# Patient Record
Sex: FEMALE | Race: BLACK OR AFRICAN AMERICAN | Employment: OTHER | URBAN - METROPOLITAN AREA
[De-identification: names, ages, dates, MRNs, and addresses within clinical notes are randomized per-mention and may not be internally consistent; named-entity substitution may affect disease eponyms.]

---

## 2017-01-17 ENCOUNTER — HOSPITAL ENCOUNTER (OUTPATIENT)
Dept: MAMMOGRAPHY | Age: 67
Discharge: HOME | End: 2017-01-17
Attending: INTERNAL MEDICINE
Payer: COMMERCIAL

## 2017-01-17 DIAGNOSIS — Z12.31 VISIT FOR SCREENING MAMMOGRAM: ICD-10-CM

## 2017-01-17 PROCEDURE — 77063 BREAST TOMOSYNTHESIS BI: CPT

## 2017-10-12 ENCOUNTER — HOSPITAL ENCOUNTER (OUTPATIENT)
Dept: LAB | Age: 67
Discharge: HOME | End: 2017-10-12
Payer: MEDICARE

## 2017-10-12 ENCOUNTER — OFFICE VISIT (OUTPATIENT)
Dept: FAMILY MEDICINE CLINIC | Age: 67
End: 2017-10-12

## 2017-10-12 VITALS
TEMPERATURE: 98 F | HEIGHT: 62 IN | WEIGHT: 213 LBS | RESPIRATION RATE: 20 BRPM | SYSTOLIC BLOOD PRESSURE: 147 MMHG | DIASTOLIC BLOOD PRESSURE: 83 MMHG | OXYGEN SATURATION: 98 % | HEART RATE: 84 BPM | BODY MASS INDEX: 39.2 KG/M2

## 2017-10-12 DIAGNOSIS — R36.9 URETHRAL DISCHARGE: ICD-10-CM

## 2017-10-12 DIAGNOSIS — Z01.419 WELL WOMAN EXAM WITH ROUTINE GYNECOLOGICAL EXAM: ICD-10-CM

## 2017-10-12 DIAGNOSIS — Z01.419 WELL WOMAN EXAM WITH ROUTINE GYNECOLOGICAL EXAM: Primary | ICD-10-CM

## 2017-10-12 DIAGNOSIS — B96.89 BACTERIAL VAGINOSIS: ICD-10-CM

## 2017-10-12 DIAGNOSIS — N76.0 BACTERIAL VAGINOSIS: ICD-10-CM

## 2017-10-12 DIAGNOSIS — N94.10 DYSPAREUNIA, FEMALE: ICD-10-CM

## 2017-10-12 DIAGNOSIS — N89.8 VAGINAL DISCHARGE: ICD-10-CM

## 2017-10-12 PROCEDURE — 87624 HPV HI-RISK TYP POOLED RSLT: CPT | Performed by: INTERNAL MEDICINE

## 2017-10-12 PROCEDURE — 87798 DETECT AGENT NOS DNA AMP: CPT | Performed by: INTERNAL MEDICINE

## 2017-10-12 PROCEDURE — 88175 CYTOPATH C/V AUTO FLUID REDO: CPT | Performed by: INTERNAL MEDICINE

## 2017-10-12 RX ORDER — GLIPIZIDE 10 MG/1
10 TABLET ORAL 2 TIMES DAILY
COMMUNITY
End: 2019-02-06 | Stop reason: SDUPTHER

## 2017-10-12 RX ORDER — ASPIRIN 81 MG/1
TABLET ORAL DAILY
COMMUNITY

## 2017-10-12 RX ORDER — COLESEVELAM 180 1/1
1875 TABLET ORAL 2 TIMES DAILY WITH MEALS
COMMUNITY
End: 2017-12-04 | Stop reason: SDUPTHER

## 2017-10-12 RX ORDER — HYDROXYCHLOROQUINE SULFATE 200 MG/1
200 TABLET, FILM COATED ORAL DAILY
COMMUNITY

## 2017-10-12 RX ORDER — CARVEDILOL 6.25 MG/1
TABLET ORAL 2 TIMES DAILY WITH MEALS
COMMUNITY
End: 2017-11-20 | Stop reason: SDUPTHER

## 2017-10-12 RX ORDER — HYDROCHLOROTHIAZIDE 12.5 MG/1
12.5 TABLET ORAL DAILY
COMMUNITY
End: 2019-10-30 | Stop reason: SDUPTHER

## 2017-10-12 RX ORDER — METRONIDAZOLE 500 MG/1
500 TABLET ORAL 3 TIMES DAILY
Qty: 21 TAB | Refills: 0 | Status: SHIPPED | OUTPATIENT
Start: 2017-10-12 | End: 2017-10-19

## 2017-10-12 RX ORDER — RAMIPRIL 10 MG/1
10 CAPSULE ORAL DAILY
COMMUNITY
End: 2018-04-26 | Stop reason: SDUPTHER

## 2017-10-12 NOTE — PROGRESS NOTES
History of Present Illness  Saskia Jennings is a 61 y.o. female who presents today for management of    Chief Complaint   Patient presents with   [facility] Well Woman       Patient has a PCP. She is here for a well woman exam.  Last LMP: postmenopausal  Patient reports of having abnormal vaginal discharge for 5 months. It is associated with foul odor, color is yellow. She also reports of dyspareunia. No associated vaginal bleeding. She is  sexually active with one partner. Last pap smear: more than 5 years ago  History of abnormal pap smear:  no    She has history of left breast cancer 30 years, s/p left mastectomy. Last mammogram was January 2017. Past Medical History  Past Medical History:   Diagnosis Date    [facility]     Diabetes ([facility]     Hypertension     IBS (irritable bowel syndrome)     SLE (systemic lupus erythematosus) ([facility]         Surgical History  Past Surgical History:   Procedure Laterality Date    HX BREAST RECONSTRUCTION Left     HX CHOLECYSTECTOMY      HX MASTECTOMY Left         Current Medications  Current Outpatient Prescriptions   Medication Sig    hydroxychloroquine (PLAQUENIL) 200 mg tablet Take 200 mg by mouth daily.  ramipril (ALTACE) 10 mg capsule Take 10 mg by mouth daily.  carvedilol (COREG) 6.25 mg tablet Take  by mouth two (2) times daily (with meals).  hydroCHLOROthiazide (HYDRODIURIL) 12.5 mg tablet Take 12.5 mg by mouth daily.  colesevelam (WELCHOL) 625 mg tablet Take 1,875 mg by mouth two (2) times daily (with meals).  aspirin delayed-release 81 mg tablet Take  by mouth daily.  glipiZIDE (GLUCOTROL) 10 mg tablet Take 10 mg by mouth two (2) times a day.  metroNIDAZOLE (FLAGYL) 500 mg tablet Take 1 Tab by mouth three (3) times daily for 7 days. No current facility-administered medications for this visit.         Allergies/Drug Reactions  Not on File     Family History  Family History   Problem Relation Age of Onset    Diabetes Mother     Heart Disease Father         Social History  Social History     Social History    Marital status:      Spouse name: N/A    Number of children: N/A    Years of education: N/A     Occupational History    Not on file. Social History Main Topics    Smoking status: Never Smoker    Smokeless tobacco: Never Used    Alcohol use No    Drug use: No    Sexual activity: Yes     Partners: Male     Other Topics Concern    Not on file     Social History Narrative    No narrative on file       Health Maintenance   Topic Date Due    Hepatitis C Screening  1950    DTaP/Tdap/Td series (1 - Tdap) 10/29/1973    PAP AKA CERVICAL CYTOLOGY  10/29/1973    BREAST CANCER SCRN MAMMOGRAM  10/29/2002    FOBT Q 1 YEAR AGE 50-75  10/29/2002    ZOSTER VACCINE AGE 60>  08/29/2012    INFLUENZA AGE 9 TO ADULT  Completed       There is no immunization history on file for this patient.     Review of Systems  General ROS: negative for - chills, fatigue or fever  Psychological ROS: negative  Ophthalmic ROS: negative  ENT ROS: negative  Allergy and Immunology ROS: negative  Endocrine ROS: negative  Breast ROS: negative for breast lumps  Respiratory ROS: no cough, shortness of breath, or wheezing  Cardiovascular ROS: no chest pain or dyspnea on exertion  Gastrointestinal ROS: no abdominal pain, change in bowel habits, or black or bloody stools  Genito-Urinary ROS: no dysuria, trouble voiding, or hematuria  positive for - vulvar/vaginal symptoms  Musculoskeletal ROS: negative  Neurological ROS: negative  Dermatological ROS: negative    Physical Exam  Vital signs:   Vitals:    10/12/17 1043   BP: 147/83   Pulse: 84   Resp: 20   Temp: 98 °F (36.7 °C)   TempSrc: Oral   SpO2: 98%   Weight: 213 lb (96.6 kg)   Height: 5' 2\" (1.575 m)       General: alert, oriented, not in distress  Chest/Lungs: clear breath sounds, no wheezing or crackles  Heart: normal rate, regular rhythm, no murmur  Abdomen: soft, non-distended, non-tender, normal bowel sounds, no organomegaly, no masses  Extremities: no focal deformities, no edema  Skin: no active skin lesions  Breasts: right breast normal without mass, skin or nipple changes or axillary nodes, left breast normal without mass, skin or nipple changes or axillary nodes  Pelvic exam: VULVA: normal appearing vulva with no masses, tenderness or lesions, VAGINA: normal appearing vagina with normal color and discharge, no lesions, CERVIX: normal appearing cervix without discharge or lesions, UTERUS: uterus is normal size, shape, consistency and nontender, ADNEXA: nontender and no masses, BLADDER: nontender to palpation, no masses, URETHRAL MEATUS: no erythema or lesions present, PERINEUM: no evidence of trauma, no rashes or skin lesions present, ANUS: within normal limits, no hemorrhoids present    Assessment/Plan:        ICD-10-CM ICD-9-CM    1. Well woman exam with routine gynecological exam Z01.419 V72.31 PAP IG, APTIMA HPV AND RFX 16/18,45 ([id number])      NUSWAB VAGINITIS PLUS   2. Vaginal discharge N89.8 623.5 NUSWAB VAGINITIS PLUS   3. Dyspareunia, female N94.10 625.0    4. Urethral discharge  R36.9 788.7 NUSWAB VAGINITIS PLUS   5. Bacterial vaginosis N76.0 616.10 metroNIDAZOLE (FLAGYL) 500 mg tablet    B96.89 041.9        Follow-up Disposition:  Return as needed, for Follow-up with PCP. I have discussed the diagnosis with the patient and the intended plan as seen in the above orders. The patient has received an after-visit summary and questions were answered concerning future plans. I have discussed medication side effects and warnings with the patient as well. I have reviewed the plan of care with the patient, accepted their input and they are in agreement with the treatment goals.        Brissa Cao MD  October 12, 2017

## 2017-10-12 NOTE — PROGRESS NOTES
History of Present Illness  Abdi Cooney is a 61 y.o. female who presents today for management of    No chief complaint on file. ***    Health Maintenance  Colon cancer: due at age 50  Dyslipidemia: will check FLP and CMP  Diabetes mellitus: will check FBG  Influenza vaccine: due in the fall  Pneumococcal vaccine: ***  Tdap: ***  Herpes Zoster vaccine: due at age 63  Hep B vaccine: not indicated    Weight: Body mass index is Estimated body mass index is Body mass index is 38.96 kg/(m^2). Nabeel Harris Discussed the patient's BMI with her.  The BMI follow up plan is as follows: Improve diet and 30 min of moderate activity at least 5 times a week. Cervical cancer:  Pap smear ***  Breast Cancer: Mammogram ***   Osteoporosis: DEXA scan ***  Diet:  {Yes/No:19414::\"no\"}  Exercise:  {Yes/No:19414::\"no\"}  Seatbelt: {Yes/No:19414::\"no\"}  Sunscreen: {Yes/No:19414::\"no\"}  Dentist: {Yes/No:19414::\"no\"}      Past Medical History  No past medical history on file. Surgical History  No past surgical history on file. Current Medications  Current Outpatient Prescriptions   Medication Sig    hydroxychloroquine (PLAQUENIL) 200 mg tablet Take 200 mg by mouth daily.  ramipril (ALTACE) 10 mg capsule Take 10 mg by mouth daily.  carvedilol (COREG) 6.25 mg tablet Take  by mouth two (2) times daily (with meals).  hydroCHLOROthiazide (HYDRODIURIL) 12.5 mg tablet Take 12.5 mg by mouth daily.  colesevelam (WELCHOL) 625 mg tablet Take 1,875 mg by mouth two (2) times daily (with meals).  aspirin delayed-release 81 mg tablet Take  by mouth daily.  glipiZIDE (GLUCOTROL) 10 mg tablet Take 10 mg by mouth two (2) times a day. No current facility-administered medications for this visit. Allergies/Drug Reactions  Not on File     Family History  No family history on file.      Social History  Social History     Social History    Marital status:      Spouse name: N/A    Number of children: N/A    Years of education: N/A     Occupational History    Not on file. Social History Main Topics    Smoking status: Never Smoker    Smokeless tobacco: Not on file    Alcohol use No    Drug use: No    Sexual activity: Yes     Partners: Male     Other Topics Concern    Not on file     Social History Narrative    No narrative on file       Health Maintenance   Topic Date Due    Hepatitis C Screening  1950    DTaP/Tdap/Td series (1 - Tdap) 10/29/1973    PAP AKA CERVICAL CYTOLOGY  10/29/1973    BREAST CANCER SCRN MAMMOGRAM  10/29/2002    FOBT Q 1 YEAR AGE 50-75  10/29/2002    ZOSTER VACCINE AGE 60>  08/29/2012    INFLUENZA AGE 9 TO ADULT  08/01/2017       There is no immunization history on file for this patient. Review of Systems  {ros master:103676}    No exam data present    Physical Exam  Vital signs:   Vitals:    10/12/17 1043   BP: 147/83   Pulse: 84   Resp: 20   Temp: 98 °F (36.7 °C)   TempSrc: Oral   SpO2: 98%   Weight: 213 lb (96.6 kg)   Height: 5' 2\" (1.575 m)       General: alert, oriented, not in distress  Head: scalp normal, atraumatic  Eyes: pupils are equal and reactive, full and intact EOM's  Ears: patent ear canal, intact tympanic membrane  Nose: normal turbinates, no congestion or discharge  Lips/Mouth: moist lips and buccal mucosa, non-enlarged tonsils, pink throat  Neck: supple, no JVD, no lymphadenopathy, non-palpable thyroid  Chest/Lungs: clear breath sounds, no wheezing or crackles  Heart: normal rate, regular rhythm, no murmur  Abdomen: soft, non-distended, non-tender, normal bowel sounds, no organomegaly, no masses  Extremities: no focal deformities, no edema  Skin: no active skin lesions    Laboratory/Tests:      Assessment/Plan:              Follow-up Disposition: Not on File      I have discussed the diagnosis with the patient and the intended plan as seen in the above orders. The patient has received an after-visit summary and questions were answered concerning future plans.   I have discussed medication side effects and warnings with the patient as well. I have reviewed the plan of care with the patient, accepted their input and they are in agreement with the treatment goals.        Fernanda Nam MD  October 12, 2017

## 2017-10-12 NOTE — MR AVS SNAPSHOT
Visit Information Date & Time Provider Department Dept. Phone Encounter #  
 10/12/2017 10:45 AM Carlee Mishra, Andrei [facility] [phone] [id number] Follow-up Instructions Return in about 4 weeks (around 11/9/2017) for rov, results. Upcoming Health Maintenance Date Due Hepatitis C Screening 1950 DTaP/Tdap/Td series (1 - Tdap) 10/29/1973 PAP AKA CERVICAL CYTOLOGY 10/29/1973 BREAST CANCER SCRN MAMMOGRAM 10/29/2002 FOBT Q 1 YEAR AGE 50-75 10/29/2002 ZOSTER VACCINE AGE 60> 8/29/2012 Allergies as of 10/12/2017  Review Complete On: 10/12/2017 By: Carlee Mishra MD  
 Not on File Current Immunizations  Never Reviewed No immunizations on file. Not reviewed this visit You Were Diagnosed With   
  
 Codes Comments Well woman exam with routine gynecological exam    -  Primary ICD-10-CM: J48.426 ICD-9-CM: V72.31 Vaginal discharge     ICD-10-CM: N89.8 ICD-9-CM: 623.5 Dyspareunia, female     ICD-10-CM: N94.10 ICD-9-CM: 625.0 Urethral discharge     ICD-10-CM: R36.9 ICD-9-CM: 546. 7 Bacterial vaginosis     ICD-10-CM: N76.0, B96.89 
ICD-9-CM: 616.10, 041.9 Vitals BP Pulse Temp Resp Height(growth percentile) Weight(growth percentile) 147/83 84 98 °F (36.7 °C) (Oral) 20 5' 2\" (1.575 m) 213 lb (96.6 kg) SpO2 BMI OB Status Smoking Status 98% 38.96 kg/m2 Menopause Never Smoker BMI and BSA Data Body Mass Index Body Surface Area  
 38.96 kg/m 2 2.06 m 2 Preferred Pharmacy Pharmacy Name Phone [facility] [address] [place] [address] [phone]. [phone] Your Updated Medication List  
  
   
This list is accurate as of: 10/12/17 11:14 AM.  Always use your most recent med list. ALTACE 10 mg capsule Generic drug:  ramipril Take 10 mg by mouth daily. aspirin delayed-release 81 mg tablet Take  by mouth daily. COREG 6.25 mg tablet Generic drug:  carvedilol Take  by mouth two (2) times daily (with meals). glipiZIDE 10 mg tablet Commonly known as:  Maxcine Marroquin Take 10 mg by mouth two (2) times a day. hydroCHLOROthiazide 12.5 mg tablet Commonly known as:  HYDRODIURIL Take 12.5 mg by mouth daily. metroNIDAZOLE 500 mg tablet Commonly known as:  FLAGYL Take 1 Tab by mouth three (3) times daily for 7 days. PLAQUENIL 200 mg tablet Generic drug:  hydroxychloroquine Take 200 mg by mouth daily. WELCHOL 625 mg tablet Generic drug:  colesevelam  
Take 1,875 mg by mouth two (2) times daily (with meals). Prescriptions Sent to Pharmacy Refills  
 metroNIDAZOLE (FLAGYL) 500 mg tablet 0 Sig: Take 1 Tab by mouth three (3) times daily for 7 days. Class: Normal  
 Pharmacy: [facility]/pharmacy #[id number] #: [phone] Route: Oral  
  
Follow-up Instructions Return in about 4 weeks (around 11/9/2017) for rov, results. To-Do List   
 10/12/2017 Lab:  NUSWAB VAGINITIS PLUS   
  
 10/12/2017 Pathology:  PAP IG, APTIMA HPV AND RFX 16/18,45 ([id number]) Introducing [facility]! Heidy Camilo introduces GuestDriven patient portal. Now you can access parts of your medical record, email your doctor's office, and request medication refills online. 1. In your internet browser, go to [email or web address] 2. Click on the First Time User? Click Here link in the Sign In box. You will see the New Member Sign Up page. 3. Enter your GuestDriven Access Code exactly as it appears below. You will not need to use this code after youve completed the sign-up process. If you do not sign up before the expiration date, you must request a new code. · GuestDriven Access Code: [email or web address] Expires: 1/10/2018 11:14 AM 
 
4.  Enter the last four digits of your Social Security Number (xxxx) and Date of Birth (mm/dd/yyyy) as indicated and click Submit. You will be taken to the next sign-up page. 5. Create a Kozio ID. This will be your Kozio login ID and cannot be changed, so think of one that is secure and easy to remember. 6. Create a Kozio password. You can change your password at any time. 7. Enter your Password Reset Question and Answer. This can be used at a later time if you forget your password. 8. Enter your e-mail address. You will receive e-mail notification when new information is available in [address] 9. Click Sign Up. You can now view and download portions of your medical record. 10. Click the Download Summary menu link to download a portable copy of your medical information. If you have questions, please visit the Frequently Asked Questions section of the Kozio website. Remember, Kozio is NOT to be used for urgent needs. For medical emergencies, dial 911. Now available from your iPhone and Android! Please provide this summary of care documentation to your next provider. Your primary care clinician is listed as Shruti Johnson. If you have any questions after today's visit, please call [phone].

## 2017-10-16 ENCOUNTER — TELEPHONE (OUTPATIENT)
Dept: FAMILY MEDICINE CLINIC | Age: 67
End: 2017-10-16

## 2017-10-16 LAB
A VAGINAE DNA VAG QL NAA+PROBE: ABNORMAL SCORE
BVAB2 DNA VAG QL NAA+PROBE: ABNORMAL SCORE
C ALBICANS DNA VAG QL NAA+PROBE: NEGATIVE
C GLABRATA DNA VAG QL NAA+PROBE: NEGATIVE
C TRACH RRNA SPEC QL NAA+PROBE: NEGATIVE
MEGA1 DNA VAG QL NAA+PROBE: ABNORMAL SCORE
N GONORRHOEA RRNA SPEC QL NAA+PROBE: NEGATIVE
SPECIMEN SOURCE: ABNORMAL
T VAGINALIS RRNA SPEC QL NAA+PROBE: POSITIVE

## 2017-10-16 NOTE — PROGRESS NOTES
Rosalba vaginitis is positive for Trichomonas. Will treat. Sent prescription for metronidazole. Patient's partner needs to be treated as well. Please inform patient.

## 2017-10-17 NOTE — TELEPHONE ENCOUNTER
Patient returned call told her partner will need to be treated for the trichomonas as well as her. Patient stated she understands .

## 2017-11-20 ENCOUNTER — OFFICE VISIT (OUTPATIENT)
Dept: FAMILY MEDICINE CLINIC | Age: 67
End: 2017-11-20

## 2017-11-20 ENCOUNTER — HOSPITAL ENCOUNTER (OUTPATIENT)
Dept: LAB | Age: 67
Discharge: HOME | End: 2017-11-20
Payer: MEDICARE

## 2017-11-20 VITALS
TEMPERATURE: 97.1 F | RESPIRATION RATE: 20 BRPM | WEIGHT: 211 LBS | OXYGEN SATURATION: 97 % | HEART RATE: 75 BPM | BODY MASS INDEX: 38.83 KG/M2 | HEIGHT: 62 IN | DIASTOLIC BLOOD PRESSURE: 74 MMHG | SYSTOLIC BLOOD PRESSURE: 139 MMHG

## 2017-11-20 DIAGNOSIS — Z00.00 WELCOME TO MEDICARE PREVENTIVE VISIT: ICD-10-CM

## 2017-11-20 DIAGNOSIS — I10 ESSENTIAL HYPERTENSION: ICD-10-CM

## 2017-11-20 DIAGNOSIS — E11.9 TYPE 2 DIABETES MELLITUS WITHOUT COMPLICATION, WITHOUT LONG-TERM CURRENT USE OF INSULIN (HCC): ICD-10-CM

## 2017-11-20 DIAGNOSIS — E11.9 TYPE 2 DIABETES MELLITUS WITHOUT COMPLICATION, WITHOUT LONG-TERM CURRENT USE OF INSULIN (HCC): Primary | ICD-10-CM

## 2017-11-20 DIAGNOSIS — M32.9 SYSTEMIC LUPUS ERYTHEMATOSUS, UNSPECIFIED SLE TYPE, UNSPECIFIED ORGAN INVOLVEMENT STATUS (HCC): ICD-10-CM

## 2017-11-20 DIAGNOSIS — Z11.59 NEED FOR HEPATITIS C SCREENING TEST: ICD-10-CM

## 2017-11-20 DIAGNOSIS — Z71.89 ACP (ADVANCE CARE PLANNING): ICD-10-CM

## 2017-11-20 DIAGNOSIS — Z85.3 HISTORY OF BREAST CANCER: ICD-10-CM

## 2017-11-20 DIAGNOSIS — Z13.39 SCREENING FOR ALCOHOLISM: ICD-10-CM

## 2017-11-20 DIAGNOSIS — Z13.31 SCREENING FOR DEPRESSION: ICD-10-CM

## 2017-11-20 DIAGNOSIS — K58.0 IRRITABLE BOWEL SYNDROME WITH DIARRHEA: ICD-10-CM

## 2017-11-20 DIAGNOSIS — Z23 ENCOUNTER FOR IMMUNIZATION: ICD-10-CM

## 2017-11-20 LAB
ALBUMIN SERPL-MCNC: 3.6 G/DL (ref 3.4–5)
ALBUMIN/GLOB SERPL: 0.8 {RATIO} (ref 0.8–1.7)
ALP SERPL-CCNC: 64 U/L (ref 45–117)
ALT SERPL-CCNC: 27 U/L (ref 13–56)
ANION GAP SERPL CALC-SCNC: 5 MMOL/L (ref 3–18)
APPEARANCE UR: CLEAR
AST SERPL-CCNC: 14 U/L (ref 15–37)
BACTERIA URNS QL MICRO: ABNORMAL /HPF
BASOPHILS # BLD: 0 K/UL (ref 0–0.06)
BASOPHILS NFR BLD: 1 % (ref 0–2)
BILIRUB SERPL-MCNC: 0.4 MG/DL (ref 0.2–1)
BILIRUB UR QL: NEGATIVE
BUN SERPL-MCNC: 21 MG/DL (ref 7–18)
BUN/CREAT SERPL: 21 (ref 12–20)
CALCIUM SERPL-MCNC: 9.3 MG/DL (ref 8.5–10.1)
CHLORIDE SERPL-SCNC: 107 MMOL/L (ref 100–108)
CHOLEST SERPL-MCNC: 160 MG/DL
CO2 SERPL-SCNC: 29 MMOL/L (ref 21–32)
COLOR UR: YELLOW
CREAT SERPL-MCNC: 1.02 MG/DL (ref 0.6–1.3)
CREAT UR-MCNC: 100.47 MG/DL (ref 30–125)
DIFFERENTIAL METHOD BLD: ABNORMAL
EOSINOPHIL # BLD: 0.2 K/UL (ref 0–0.4)
EOSINOPHIL NFR BLD: 2 % (ref 0–5)
EPITH CASTS URNS QL MICRO: ABNORMAL /LPF (ref 0–5)
ERYTHROCYTE [DISTWIDTH] IN BLOOD BY AUTOMATED COUNT: 14.7 % (ref 11.6–14.5)
EST. AVERAGE GLUCOSE BLD GHB EST-MCNC: 169 MG/DL
GLOBULIN SER CALC-MCNC: 4.5 G/DL (ref 2–4)
GLUCOSE SERPL-MCNC: 114 MG/DL (ref 74–99)
GLUCOSE UR STRIP.AUTO-MCNC: NEGATIVE MG/DL
HBA1C MFR BLD: 7.5 % (ref 4.2–5.6)
HCT VFR BLD AUTO: 38.9 % (ref 35–45)
HDLC SERPL-MCNC: 76 MG/DL (ref 40–60)
HDLC SERPL: 2.1 {RATIO} (ref 0–5)
HGB BLD-MCNC: 12.3 G/DL (ref 12–16)
HGB UR QL STRIP: NEGATIVE
KETONES UR QL STRIP.AUTO: NEGATIVE MG/DL
LDLC SERPL CALC-MCNC: 61.6 MG/DL (ref 0–100)
LEUKOCYTE ESTERASE UR QL STRIP.AUTO: ABNORMAL
LIPID PROFILE,FLP: ABNORMAL
LYMPHOCYTES # BLD: 1.8 K/UL (ref 0.9–3.6)
LYMPHOCYTES NFR BLD: 27 % (ref 21–52)
MCH RBC QN AUTO: 25.1 PG (ref 24–34)
MCHC RBC AUTO-ENTMCNC: 31.6 G/DL (ref 31–37)
MCV RBC AUTO: 79.4 FL (ref 74–97)
MICROALBUMIN UR-MCNC: 0.4 MG/DL (ref 0–3)
MICROALBUMIN/CREAT UR-RTO: <4 MG/G (ref 0–30)
MONOCYTES # BLD: 0.4 K/UL (ref 0.05–1.2)
MONOCYTES NFR BLD: 6 % (ref 3–10)
NEUTS SEG # BLD: 4.2 K/UL (ref 1.8–8)
NEUTS SEG NFR BLD: 64 % (ref 40–73)
NITRITE UR QL STRIP.AUTO: NEGATIVE
PH UR STRIP: 5 [PH] (ref 5–8)
PLATELET # BLD AUTO: 321 K/UL (ref 135–420)
PMV BLD AUTO: 9.6 FL (ref 9.2–11.8)
POTASSIUM SERPL-SCNC: 4.3 MMOL/L (ref 3.5–5.5)
PROT SERPL-MCNC: 8.1 G/DL (ref 6.4–8.2)
PROT UR STRIP-MCNC: NEGATIVE MG/DL
RBC # BLD AUTO: 4.9 M/UL (ref 4.2–5.3)
RBC #/AREA URNS HPF: 0 /HPF (ref 0–5)
SODIUM SERPL-SCNC: 141 MMOL/L (ref 136–145)
SP GR UR REFRACTOMETRY: 1.02 (ref 1–1.03)
TRIGL SERPL-MCNC: 112 MG/DL (ref ?–150)
UROBILINOGEN UR QL STRIP.AUTO: 0.2 EU/DL (ref 0.2–1)
VLDLC SERPL CALC-MCNC: 22.4 MG/DL
WBC # BLD AUTO: 6.6 K/UL (ref 4.6–13.2)
WBC URNS QL MICRO: ABNORMAL /HPF (ref 0–4)

## 2017-11-20 PROCEDURE — 81001 URINALYSIS AUTO W/SCOPE: CPT | Performed by: INTERNAL MEDICINE

## 2017-11-20 PROCEDURE — 86803 HEPATITIS C AB TEST: CPT | Performed by: INTERNAL MEDICINE

## 2017-11-20 PROCEDURE — 82043 UR ALBUMIN QUANTITATIVE: CPT | Performed by: INTERNAL MEDICINE

## 2017-11-20 PROCEDURE — 36415 COLL VENOUS BLD VENIPUNCTURE: CPT | Performed by: INTERNAL MEDICINE

## 2017-11-20 PROCEDURE — 83036 HEMOGLOBIN GLYCOSYLATED A1C: CPT | Performed by: INTERNAL MEDICINE

## 2017-11-20 PROCEDURE — 80061 LIPID PANEL: CPT | Performed by: INTERNAL MEDICINE

## 2017-11-20 PROCEDURE — 80053 COMPREHEN METABOLIC PANEL: CPT | Performed by: INTERNAL MEDICINE

## 2017-11-20 PROCEDURE — 85025 COMPLETE CBC W/AUTO DIFF WBC: CPT | Performed by: INTERNAL MEDICINE

## 2017-11-20 RX ORDER — IBUPROFEN 600 MG/1
1 TABLET ORAL
Refills: 1 | COMMUNITY
Start: 2017-10-29 | End: 2021-07-26

## 2017-11-20 RX ORDER — SITAGLIPTIN AND METFORMIN HYDROCHLORIDE 100; 1000 MG/1; MG/1
1 TABLET, FILM COATED, EXTENDED RELEASE ORAL DAILY
Refills: 0 | COMMUNITY
Start: 2017-11-06 | End: 2017-12-04 | Stop reason: SDUPTHER

## 2017-11-20 NOTE — ACP (ADVANCE CARE PLANNING)
Advance Care Planning (ACP) Provider Note - Comprehensive     Date of ACP Conversation: 11/20/17  Persons included in Conversation:  patient  Length of ACP Conversation in minutes:  <16 minutes (Non-Billable)    Authorized Decision Maker (if patient is incapable of making informed decisions): This person is:  Healthcare Agent/Medical Power of  under Advance Directive          General ACP for ALL Patients with Decision Making Capacity:   Importance of advance care planning, including choosing a healthcare agent to communicate patient's healthcare decisions if patient lost the ability to make decisions, such as after a sudden illness or accident  Understanding of the healthcare agent role was assessed and information provided  Exploration of values, goals, and preferences if recovery is not expected, even with continued medical treatment in the event of: Imminent death  Severe, permanent brain injury  \"In these circumstances, what matters most to you? \"  Care focused more on comfort or quality of life. Opportunity offered to explore how cultural, Caodaism, spiritual, or personal beliefs would affect decisions for future care     For Serious or Chronic Illness:  Understanding of medical condition    Understanding of CPR, goals and expected outcomes, benefits and burdens discussed. Information on CPR success rates provided (e.g. for CPR in hospital, survival to d/c at two weeks is 22%, for chronically ill or elderly/frail survival is less than 3%); Individual asked to communicate understanding of information in his/her own words.   Explored fears and concerns regarding CPR or possible outcomes    Interventions Provided:  Recommended completion of Advance Directive form after review of ACP materials and conversation with prospective healthcare agent   Recommended communicating the plan and making copies for the healthcare agent, personal physician, and others as appropriate (e.g., health system)

## 2017-11-20 NOTE — ASSESSMENT & PLAN NOTE
Stable, based on history, physical exam and review of pertinent labs, studies and medications; meds reconciled; continue current treatment plan, lifestyle modifications recommended. Key Antihyperglycemic Medications             glipiZIDE (GLUCOTROL) 10 mg tablet  (Taking) Take 10 mg by mouth two (2) times a day. JANUMET -1,000 mg TM24 Take 1 Tab by mouth daily. Other Key Diabetic Medications             ramipril (ALTACE) 10 mg capsule  (Taking) Take 10 mg by mouth daily. colesevelam [facility]) 625 mg tablet  (Taking) Take 1,875 mg by mouth two (2) times daily (with meals). ramipril (ALTACE) 10 mg capsule  (Taking) TAKE ONE CAPSULE BY MOUTH DAILY    colesevelam (WELCHOL) 625 mg tablet  (Taking) Take 1,875 mg by mouth two (2) times daily (with meals).         No results found for: HBA1C, WLA9BEBG, KCR1NPNC, GLU, CREA, MCREA, CREAPOC, CREATEXT, MALBUR, MCALPOCT, MCACRPOC, MCACR, MCAU, MCAU2, MALBEXT, CHOL, CHOLPOCT, HDL, HDLPOC, LDLC, LDL, LDLCEXT, LDLCPOC, TRIGL, TGLPOCT, RRY1CLHM  Diabetic Foot and Eye Exam HM Status   Topic Date Due    Diabetic Foot Care  10/29/1962    Eye Exam  10/29/1960

## 2017-11-20 NOTE — ASSESSMENT & PLAN NOTE
This condition is managed by Specialist.  No results found for: WBC, WBCT, WBCPOC, HGB, HGBPOC, HCT, HCTPOC, PLT, PLTPOC, CREA, MCREA, CREAPOC, BUN, IBUN, BUNPOC, K, KPOCT, INR, PTP, PTINR, PTEXT, HGBEXT, HCTEXT, PLTEXT, PTEXT

## 2017-11-20 NOTE — PROGRESS NOTES
1. Have you been to the ER, urgent care clinic since your last visit? Hospitalized since your last visit? No    2. Have you seen or consulted any other health care providers outside of the [address] [place] [address] since your last visit? Include any pap smears or colon screening.  No

## 2017-11-20 NOTE — PROGRESS NOTES
History of Present Illness  Rachel Bonilla is a 74 y.o. female who presents today for management of    Chief Complaint   Patient presents with    Results    Diabetes    Welcome To Medicare    Hypertension       Patient is here to establish care. Cardiovascular Review:  The patient has diabetes, hypertension, hyperlipidemia   Diet and Lifestyle: generally follows a low fat low cholesterol diet, generally follows a low sodium diet, exercises sporadically, nonsmoker  Home BP Monitoring: is not measured at home. Pertinent ROS: taking medications as instructed, no medication side effects noted, no TIA's, no chest pain on exertion, no dyspnea on exertion, no swelling of ankles. Diabetes Mellitus:  She has diabetes mellitus, and  hypertension and hyperlipidemia. Diabetic ROS - medication compliance: compliant all of the time, diabetic diet compliance: compliant all of the time, home glucose monitoring: fasting values range  -214-. Lab review: orders written for new lab studies as appropriate; see orders. SLE  Patient was diagnosed with SLE in 3 years ago. Symptoms include malar rash and arthritis and are of mild severity. Symptoms are made worse by cold exposure. Symptoms are helped by ibuprofen. Associated symptoms include none. Patient denies associated none. Patient is currently taking the following medications associate with Systemic Lupus Erythematosus type syndrome: Plaquenil. Irritable Bowel Syndrome  Patient complains of irritable bowel syndrome. Onset of symptoms was several years ago. She describes symptoms as diarrhea. Patient denies difficulty swallowing, weight loss, melena, hematochezia, fever. Course to date has been well controlled. Previous visits for this problem: yes, last seen a few months ago by a consultant for this condition. Evaluation to date has been colonoscopy. Treatment to date has been Welchol.     Past Medical History:   Diagnosis Date    Angina pectoris w/ silent ischemia    [facility] [address]     Cancer ([facility] [address] 1987    breast    Chronic insomnia     Diabetes ([facility] [address] [facility]     Diabetes mellitus type II 2003    Exogenous obesity     HTN (hypertension) 4/5/2010    Hypertension     IBS (irritable bowel syndrome)     SLE (systemic lupus erythematosus) ([facility] [address]      Past Surgical History:   Procedure Laterality Date    EGD  02/20/09    HX BREAST BIOPSY  2003    right breast neg.  HX BREAST RECONSTRUCTION  1988    left breast    HX BREAST RECONSTRUCTION Left     HX BREAST REDUCTION Right 1987    HX CHOLECYSTECTOMY  1990    HX CHOLECYSTECTOMY      HX HEART CATHETERIZATION  11/28/03    left, normal    HX MASTECTOMY  1987    left breast    HX MASTECTOMY Left     OH COLONOSCOPY FLX DX W/COLLJ SPEC WHEN PFRMD  2005 3/09    [facility]by --egd capsule colon (NEG)     Family History   Problem Relation Age of Onset    Diabetes Mother     Heart Disease Father     Heart Attack Father     Breast Cancer Paternal Aunt     Diabetes Brother     Asthma Brother      Social History   Substance Use Topics    Smoking status: Never Smoker    Smokeless tobacco: Never Used    Alcohol use No     Allergies   Allergen Reactions    Penicillins Hives     Prior to Admission medications    Medication Sig Start Date End Date Taking? Authorizing Provider   hydroxychloroquine (PLAQUENIL) 200 mg tablet Take 200 mg by mouth daily. Yes Historical Provider   ramipril (ALTACE) 10 mg capsule Take 10 mg by mouth daily. Yes Historical Provider   carvedilol (COREG) 6.25 mg tablet Take  by mouth two (2) times daily (with meals). Yes Historical Provider   hydroCHLOROthiazide (HYDRODIURIL) 12.5 mg tablet Take 12.5 mg by mouth daily. Yes Historical Provider   [facility]) 625 mg tablet Take 1,875 mg by mouth two (2) times daily (with meals). Yes Historical Provider   aspirin delayed-release 81 mg tablet Take  by mouth daily.    Yes Historical Provider   glipiZIDE (GLUCOTROL) 10 mg tablet Take 10 mg by mouth two (2) times a day. Yes Historical Provider   Insulin Needles, Disposable, (BD INSULIN PEN NEEDLE UF SHORT) 31 X 5/16 \" Ndle USE WITH BYETTA PEN TWICE DAILY 1/13/11  Yes Evangelina Oliver MD   sitagliptin (JANUVIA) 25 mg tablet Take 25 mg by mouth daily. Yes Historical Provider   carvedilol (COREG) 6.25 mg tablet Take 1 Tab by mouth two (2) times daily (with meals). 1/5/11  Yes Evangelina Oliver MD   exenatide (BYETTA) 10 mcg/0.04 mL PnIj 1 Dose by SubCUTAneous route two (2) times a day. 12/13/10  Yes Evangelina Oliver MD   ramipril (ALTACE) 10 mg capsule TAKE ONE CAPSULE BY MOUTH DAILY 8/19/10  Yes Nic Trevino MD   estrogen, conjugated,-medroxyprogesterone (PREMPRO) 0.3-1.5 mg per tablet Take 1 Tab by mouth daily. 6/7/10  Yes Nic Trevino MD   [facility]) 625 mg tablet Take 1,875 mg by mouth two (2) times daily (with meals). Yes Historical Provider   hydrochlorothiazide (HYDRODIURIL) 25 mg tablet Take 1 Tab by mouth daily. 4/5/10  Yes Nic Trevino MD        Review of Systems     Objective:     Physical Exam         Past Medical History  Past Medical History:   Diagnosis Date    Angina pectoris     w/ silent ischemia    [facility] ([facility] [address]     Cancer ([facility] [address] 1987    breast    Chronic insomnia     Diabetes ([address]     Diabetes mellitus type II 2003    Exogenous obesity     HTN (hypertension) 4/5/2010    Hypertension     IBS (irritable bowel syndrome)     SLE (systemic lupus erythematosus) ([address]         Surgical History  Past Surgical History:   Procedure Laterality Date    EGD  02/20/09    HX BREAST BIOPSY  2003    right breast neg.     HX BREAST RECONSTRUCTION  1988    left breast    HX BREAST RECONSTRUCTION Left     HX BREAST REDUCTION Right 1987    HX CHOLECYSTECTOMY  1990    HX CHOLECYSTECTOMY      HX HEART CATHETERIZATION  11/28/03    left, normal    HX MASTECTOMY  1987    left breast    HX MASTECTOMY Left     FL COLONOSCOPY FLX DX W/COLLJ [facility] WHEN PFRMD  2005 3/09    [facility] --egd capsule colon (NEG)        Current Medications  Current Outpatient Prescriptions   Medication Sig    hydroxychloroquine (PLAQUENIL) 200 mg tablet Take 200 mg by mouth daily.  ramipril (ALTACE) 10 mg capsule Take 10 mg by mouth daily.  carvedilol (COREG) 6.25 mg tablet Take  by mouth two (2) times daily (with meals).  hydroCHLOROthiazide (HYDRODIURIL) 12.5 mg tablet Take 12.5 mg by mouth daily.  colesevelam (WELCHOL) 625 mg tablet Take 1,875 mg by mouth two (2) times daily (with meals).  aspirin delayed-release 81 mg tablet Take  by mouth daily.  glipiZIDE (GLUCOTROL) 10 mg tablet Take 10 mg by mouth two (2) times a day.  Insulin Needles, Disposable, (BD INSULIN PEN NEEDLE UF SHORT) 31 X 5/16 \" Ndle USE WITH BYETTA PEN TWICE DAILY    sitagliptin (JANUVIA) 25 mg tablet Take 25 mg by mouth daily.  carvedilol (COREG) 6.25 mg tablet Take 1 Tab by mouth two (2) times daily (with meals).  exenatide (BYETTA) 10 mcg/0.04 mL PnIj 1 Dose by SubCUTAneous route two (2) times a day.  ramipril (ALTACE) 10 mg capsule TAKE ONE CAPSULE BY MOUTH DAILY    estrogen, conjugated,-medroxyprogesterone (PREMPRO) 0.3-1.5 mg per tablet Take 1 Tab by mouth daily.  colesevelam (WELCHOL) 625 mg tablet Take 1,875 mg by mouth two (2) times daily (with meals).  hydrochlorothiazide (HYDRODIURIL) 25 mg tablet Take 1 Tab by mouth daily. No current facility-administered medications for this visit.         Allergies/Drug Reactions  Allergies   Allergen Reactions    Penicillins Hives        Family History  Family History   Problem Relation Age of Onset    Diabetes Mother     Heart Disease Father     Heart Attack Father     Breast Cancer Paternal Aunt     Diabetes Brother     Asthma Brother         Social History  Social History     Social History    Marital status:      Spouse name: N/A    Number of children: N/A    Years of education: N/A Occupational History    Not on file.      Social History Main Topics    Smoking status: Never Smoker    Smokeless tobacco: Never Used    Alcohol use No    Drug use: No    Sexual activity: Yes     Partners: Male     Other Topics Concern    Not on file     Social History Narrative    ** Merged History Encounter **            Health Maintenance   Topic Date Due    Hepatitis C Screening  1950    FOOT EXAM Q1  10/29/1962    MICROALBUMIN Q1  10/29/1962    EYE EXAM RETINAL OR DILATED Q1  10/29/1962    FOBT Q 1 YEAR AGE 50-75  10/29/2002    LIPID PANEL Q1  01/14/2011    HEMOGLOBIN A1C Q6M  06/13/2011    ZOSTER VACCINE AGE 60>  08/29/2012    GLAUCOMA SCREENING Q2Y  10/29/2017    OSTEOPOROSIS SCREENING (DEXA)  10/29/2017    Pneumococcal 65+ High/Highest Risk (2 of 2 - PPSV23) 10/29/2017    MEDICARE YEARLY EXAM  10/29/2017    BREAST CANCER SCRN MAMMOGRAM  01/17/2019    DTaP/Tdap/Td series (2 - Td) 04/05/2020    PAP AKA CERVICAL CYTOLOGY  10/12/2020    Influenza Age 9 to Adult  Completed     Immunization History   Administered Date(s) Administered    Hepatitis A Vaccine 01/09/2006, 10/20/2006    Influenza High Dose Vaccine PF 08/25/2017    Influenza Vaccine Whole 10/05/2009    TD Vaccine 04/05/2006    TDAP Vaccine 04/05/2010    ZZZ-RETIRED (DO NOT USE) Pneumococcal Vaccine (Unspecified Type) 02/18/2004       Review of Systems  General ROS: negative for - chills, fatigue or fever  Psychological ROS: negative  Ophthalmic ROS: negative  ENT ROS: negative  Allergy and Immunology ROS: negative  Hematological and Lymphatic ROS: negative  Breast ROS: negative for breast lumps  Respiratory ROS: no cough, shortness of breath, or wheezing  Cardiovascular ROS: no chest pain or dyspnea on exertion  Gastrointestinal ROS: no abdominal pain, change in bowel habits, or black or bloody stools  Genito-Urinary ROS: no dysuria, trouble voiding, or hematuria  Musculoskeletal ROS: positive for - joint pain and joint stiffness  Neurological ROS: negative  Dermatological ROS: negative    No exam data present    Physical Exam  Vital signs:   Vitals:    11/20/17 1113   BP: 139/74   Pulse: 75   Resp: 20   Temp: 97.1 °F (36.2 °C)   TempSrc: Oral   SpO2: 97%   Weight: 211 lb (95.7 kg)   Height: 5' 2\" (1.575 m)       General: alert, oriented, not in distress  Head: scalp normal, atraumatic  Eyes: pupils are equal and reactive, full and intact EOM's  Ears: patent ear canal, intact tympanic membrane  Nose: normal turbinates, no congestion or discharge  Lips/Mouth: moist lips and buccal mucosa, non-enlarged tonsils, pink throat  Neck: supple, no JVD, no lymphadenopathy, non-palpable thyroid  Chest/Lungs: clear breath sounds, no wheezing or crackles  Heart: normal rate, regular rhythm, no murmur  Abdomen: soft, non-distended, non-tender, normal bowel sounds, no organomegaly, no masses  Extremities: no focal deformities, no edema  Skin: no active skin lesions      Assessment/Plan:    1. Type 2 diabetes mellitus without complication, without long-term current use of insulin ([facility] [address]  - control uncertain  - continue current medications  - CBC WITH AUTOMATED DIFF; Future  - HEMOGLOBIN A1C WITH EAG; Future  - LIPID PANEL; Future  - METABOLIC PANEL, COMPREHENSIVE; Future  - MICROALBUMIN, UR, RAND W/ MICROALBUMIN/CREA RATIO; Future    2. Essential hypertension  - controlled  - low salt diet  - continue current medications  - METABOLIC PANEL, COMPREHENSIVE; Future  - URINALYSIS W/ RFLX MICROSCOPIC; Future    3. Systemic lupus erythematosus, unspecified SLE type, unspecified organ involvement status ([facility] [address]  - stable  - rheumatology follow-up  - continue Plaquenil as per rheum    4. Need for hepatitis C screening test  - HCV AB W/RFLX TO ANALI; Future    5. IBS  - stable  - GI follow-up    6. History of breast cancer 30 years ago  - stable    Follow-up Disposition:  Return in about 2 weeks (around 12/4/2017) for results, follow-up.       I have discussed the diagnosis with the patient and the intended plan as seen in the above orders. The patient has received an after-visit summary and questions were answered concerning future plans. I have discussed medication side effects and warnings with the patient as well. I have reviewed the plan of care with the patient, accepted their input and they are in agreement with the treatment goals. Nidia Guy MD  November 20, 2017      This is a \"Welcome to [employer]"  Initial Preventive Physical Examination (IPPE) providing Personalized Prevention Plan Services (Performed in the first 12 months of enrollment)    I have reviewed the patient's medical history in detail and updated the computerized patient record. History     Past Medical History:   Diagnosis Date    Angina pectoris     w/ silent ischemia    [facility] [address]     Cancer ([facility] [address] 1987    breast    Chronic insomnia     Diabetes ([facility] [address]     Diabetes mellitus type II 2003    Exogenous obesity     HTN (hypertension) 4/5/2010    Hypertension     IBS (irritable bowel syndrome)     SLE (systemic lupus erythematosus) ([facility] [address]       Past Surgical History:   Procedure Laterality Date    EGD  02/20/09    HX BREAST BIOPSY  2003    right breast neg.  HX BREAST RECONSTRUCTION  1988    left breast    HX BREAST RECONSTRUCTION Left     HX BREAST REDUCTION Right 1987    HX CHOLECYSTECTOMY  1990    HX CHOLECYSTECTOMY      HX HEART CATHETERIZATION  11/28/03    left, normal    HX MASTECTOMY  1987    left breast    HX MASTECTOMY Left     NE COLONOSCOPY FLX DX W/COLLJ SPEC WHEN PFRMD  2005 3/09    [facility] --egd capsule colon (NEG)     Current Outpatient Prescriptions   Medication Sig Dispense Refill    diph,pertuss,acel,,tetanus vac,PF, (ADACEL) 2 Lf-(2.5-5-3-5 mcg)-5Lf/0.5 mL syrg vaccine 0.5 mL by IntraMUSCular route once for 1 dose. 0.5 mL 0    hydroxychloroquine (PLAQUENIL) 200 mg tablet Take 200 mg by mouth daily.       ramipril (ALTACE) 10 mg capsule Take 10 mg by mouth daily.  hydroCHLOROthiazide (HYDRODIURIL) 12.5 mg tablet Take 12.5 mg by mouth daily.  colesevelam (WELCHOL) 625 mg tablet Take 1,875 mg by mouth two (2) times daily (with meals).  aspirin delayed-release 81 mg tablet Take  by mouth daily.  glipiZIDE (GLUCOTROL) 10 mg tablet Take 10 mg by mouth two (2) times a day.  ramipril (ALTACE) 10 mg capsule TAKE ONE CAPSULE BY MOUTH DAILY 90 Cap 3    colesevelam (WELCHOL) 625 mg tablet Take 1,875 mg by mouth two (2) times daily (with meals).  hydrochlorothiazide (HYDRODIURIL) 25 mg tablet Take 1 Tab by mouth daily. 90 Tab 3    JANUMET -1,000 mg TM24 Take 1 Tab by mouth daily. 0    ibuprofen (MOTRIN) 600 mg tablet Take 1 Tab by mouth every six (6) hours as needed. 1     Allergies   Allergen Reactions    Penicillins Hives     Family History   Problem Relation Age of Onset    Diabetes Mother     Heart Disease Father     Heart Attack Father     Breast Cancer Paternal Aunt     Diabetes Brother     Asthma Brother      Social History   Substance Use Topics    Smoking status: Never Smoker    Smokeless tobacco: Never Used    Alcohol use No     Diet, Lifestyle: No special diet    Exercise level: moderately active    Depression Risk Screen     PHQ over the last two weeks 11/20/2017   Little interest or pleasure in doing things Not at all   Feeling down, depressed or hopeless Not at all   Total Score PHQ 2 0     Alcohol Risk Screen   You do not drink alcohol or very rarely. Functional Ability and Level of Safety   Hearing Loss  Hearing is good. Vision Screening  Vision is good. No exam data present      Activities of Daily Living  The home contains: no safety equipment. Patient does total self care    Fall Risk Screen  Fall Risk Assessment, last 12 mths 11/20/2017   Able to walk? Yes   Fall in past 12 months?  No       Abuse Screen  Patient is not abused    Screening EKG   EKG order placed: No    Patient Care Team   Patient Care Team:  Ten Kim MD as PCP - General (Internal Medicine)  Nikia Mccloud MD (Internal Medicine)  Jose Armando Araujo MD as Physician (Rheumatology)  Ирина Foreman MD as Physician (Ophthalmology)  Luiz Chin MD (Gastroenterology)     End of Life Planning   Advanced care planning directives were discussed with the patient and /or family/caregiver. Assessment/Plan   Education and counseling provided:  Are appropriate based on today's review and evaluation  End-of-Life planning (with patient's consent)  Pneumococcal Vaccine    Diagnoses and all orders for this visit:    1. Type 2 diabetes mellitus without complication, without long-term current use of insulin (HCC)  -     CBC WITH AUTOMATED DIFF; Future  -     HEMOGLOBIN A1C WITH EAG; Future  -     LIPID PANEL; Future  -     METABOLIC PANEL, COMPREHENSIVE; Future  -     MICROALBUMIN, UR, RAND W/ MICROALBUMIN/CREA RATIO; Future    2. Essential hypertension  -     METABOLIC PANEL, COMPREHENSIVE; Future  -     URINALYSIS W/ RFLX MICROSCOPIC; Future    3. Systemic lupus erythematosus, unspecified SLE type, unspecified organ involvement status ([facility] [address]    4. Need for hepatitis C screening test  -     HCV AB W/RFLX TO ANALI; Future    5. ACP (advance care planning)    6. Welcome to Medicare preventive visit    7. Screening for alcoholism  -     Annual  Alcohol Screen 15 min ()    8. Screening for depression  -     Depression Screen Annual    9. Encounter for immunization  -     Pneumococcal Admin ()  -     Pneumococcal Conjugate Vaccine  -     diph,pertuss,acel,,tetanus vac,PF, (ADACEL) 2 Lf-(2.5-5-3-5 mcg)-5Lf/0.5 mL syrg vaccine; 0.5 mL by IntraMUSCular route once for 1 dose.         Health Maintenance Due   Topic Date Due    Hepatitis C Screening  1950    FOOT EXAM Q1  10/29/1962    MICROALBUMIN Q1  10/29/1962    EYE EXAM RETINAL OR DILATED Q1  10/29/1962    FOBT Q 1 YEAR AGE 50-75  10/29/2002    LIPID PANEL Q1  01/14/2011    HEMOGLOBIN A1C Q6M  06/13/2011    ZOSTER VACCINE AGE 60>  08/29/2012    GLAUCOMA SCREENING Q2Y  10/29/2017    OSTEOPOROSIS SCREENING (DEXA)  10/29/2017    Pneumococcal 65+ High/Highest Risk (2 of 2 - PPSV23) 10/29/2017    MEDICARE YEARLY EXAM  10/29/2017

## 2017-11-20 NOTE — PATIENT INSTRUCTIONS
Medicare Wellness Visit, Female    The best way to live healthy is to have a healthy lifestyle by eating a well-balanced diet, exercising regularly, limiting alcohol and stopping smoking. Regular physical exams and screening tests are another way to keep healthy. Preventive exams provided by your health care provider can find health problems before they become diseases or illnesses. Preventive services including immunizations, screening tests, monitoring and exams can help you take care of your own health. All people over age 72 should have a pneumovax  and and a prevnar shot to prevent pneumonia. These are once in a lifetime unless you and your provider decide differently. All people over 65 should have a yearly flu shot and a tetanus vaccine every 10 years. A bone mass density to screen for osteoporosis or thinning of the bones should be done every 2 years after 65. Screening for diabetes mellitus with a blood sugar test should be done every year. Glaucoma is a disease of the eye due to increased ocular pressure that can lead to blindness and it should be done every year by an eye professional.    Cardiovascular screening tests that check for elevated lipids (fatty part of blood) which can lead to heart disease and strokes should be done every 5 years. Colorectal screening that evaluates for blood or polyps in your colon should be done yearly as a stool test or every five years as a flexible sigmoidoscope or every 10 years as a colonoscopy up to age 76. Breast cancer screening with a mammogram is recommended biennially  for women age 54-69. Screening for cervical cancer with a pap smear and pelvic exam is recommended for women after age 72 years every 2 years up to age 79 or when the provider and patient decide to stop. If there is a history of cervical abnormalities or other increased risk for cancer then the test is recommended yearly.     Hepatitis C screening is also recommended for anyone born between [birth year] through [facility]. A shingles vaccine is also recommended once in a lifetime after age 61. Your Medicare Wellness Exam is recommended annually.     Here is a list of your current Health Maintenance items with a due date:  Health Maintenance Due   Topic Date Due    Hepatitis C Test  1950    Diabetic Foot Care  10/29/1962    Albumin Urine Test  10/29/1962    Eye Exam  10/29/1962    Stool testing for trace blood  10/29/2002    Cholesterol Test   01/14/2011    Hemoglobin A1C    06/13/2011    Shingles Vaccine  08/29/2012    Glaucoma Screening   10/29/2017    Bone Density Screening  10/29/2017    Pneumococcal Vaccine (2 of 2 - PPSV23) 10/29/2017    Annual Well Visit  10/29/2017

## 2017-11-21 LAB
HCV AB S/CO SERPL IA: <0.1 S/CO RATIO (ref 0–0.9)
HCV AB SERPL QL IA: NORMAL

## 2017-12-04 ENCOUNTER — OFFICE VISIT (OUTPATIENT)
Dept: FAMILY MEDICINE CLINIC | Age: 67
End: 2017-12-04

## 2017-12-04 VITALS
BODY MASS INDEX: 38.98 KG/M2 | HEART RATE: 88 BPM | OXYGEN SATURATION: 98 % | SYSTOLIC BLOOD PRESSURE: 135 MMHG | RESPIRATION RATE: 20 BRPM | TEMPERATURE: 98 F | HEIGHT: 62 IN | WEIGHT: 211.8 LBS | DIASTOLIC BLOOD PRESSURE: 75 MMHG

## 2017-12-04 DIAGNOSIS — E11.9 TYPE 2 DIABETES MELLITUS WITHOUT COMPLICATION, WITHOUT LONG-TERM CURRENT USE OF INSULIN (HCC): Primary | ICD-10-CM

## 2017-12-04 DIAGNOSIS — I10 ESSENTIAL HYPERTENSION: ICD-10-CM

## 2017-12-04 DIAGNOSIS — K58.0 IRRITABLE BOWEL SYNDROME WITH DIARRHEA: ICD-10-CM

## 2017-12-04 DIAGNOSIS — M32.9 SYSTEMIC LUPUS ERYTHEMATOSUS, UNSPECIFIED SLE TYPE, UNSPECIFIED ORGAN INVOLVEMENT STATUS (HCC): ICD-10-CM

## 2017-12-04 RX ORDER — SITAGLIPTIN AND METFORMIN HYDROCHLORIDE 100; 1000 MG/1; MG/1
1 TABLET, FILM COATED, EXTENDED RELEASE ORAL DAILY
Qty: 90 TAB | Refills: 3 | Status: SHIPPED | OUTPATIENT
Start: 2017-12-04 | End: 2018-09-06

## 2017-12-04 RX ORDER — ACETAMINOPHEN 500 MG
TABLET ORAL
Qty: 1 KIT | Refills: 0 | Status: SHIPPED | OUTPATIENT
Start: 2017-12-04 | End: 2021-07-26

## 2017-12-04 NOTE — PROGRESS NOTES
1. Have you been to the ER, urgent care clinic since your last visit? Hospitalized since your last visit? No    2. Have you seen or consulted any other health care providers outside of the [address] [place] [address] since your last visit? Include any pap smears or colon screening.  No

## 2017-12-04 NOTE — PROGRESS NOTES
History of Present Illness  Aaron Hutchins is a 74 y.o. female who presents today for management of    Chief Complaint   Patient presents with    Results    Hypertension    Cholesterol Problem    Diabetes       Diabetes Mellitus:  She has diabetes mellitus, and  diabetes, hypertension and hyperlipidemia. Diabetic ROS - medication compliance: compliant all of the time, diabetic diet compliance: compliant all of the time, home glucose monitoring: is performed regularly, fasting values range 555-832-, further diabetic ROS: no polyuria or polydipsia, no chest pain, dyspnea or TIA's, no numbness, tingling or pain in extremities, last eye exam approximately 3 months ago. Lab review: labs are reviewed, up to date and normal.     Cardiovascular Review:  The patient has hypertension and hyperlipidemia. Diet and Lifestyle: generally follows a low fat low cholesterol diet, generally follows a low sodium diet  Home BP Monitoring: is not measured at home. Pertinent ROS: taking medications as instructed, no medication side effects noted, no TIA's, no chest pain on exertion, no dyspnea on exertion, no swelling of ankles.        Problem List  Patient Active Problem List    Diagnosis Date Noted    Class 2 obesity due to excess calories with serious comorbidity and body mass index (BMI) of 38.0 to 38.9 in adult 12/04/2017    ACP (advance care planning) 11/20/2017    History of breast cancer 11/20/2017    SLE (systemic lupus erythematosus) ([address]     IBS (irritable bowel syndrome)     Diabetes mellitus, type 2 ([address]     HTN (hypertension) 04/05/2010       Past Medical History  Past Medical History:   Diagnosis Date    Angina pectoris     w/ silent ischemia    Breast cancer ([address]     Cancer ([address] 1987    breast    Chronic insomnia     Diabetes ([address]     Diabetes mellitus type II 2003    Exogenous obesity     HTN (hypertension) 4/5/2010    Hypertension     IBS (irritable bowel syndrome)     SLE [facility]systemic lupus erythematosus) [facility])         Surgical History  Past Surgical History:   Procedure Laterality Date    EGD  02/20/09    HX BREAST BIOPSY  2003    right breast neg.  HX BREAST RECONSTRUCTION  1988    left breast    HX BREAST RECONSTRUCTION Left     HX BREAST REDUCTION Right 1987    HX CHOLECYSTECTOMY  1990    HX CHOLECYSTECTOMY      HX HEART CATHETERIZATION  11/28/03    left, normal    HX MASTECTOMY  1987    left breast    HX MASTECTOMY Left     [place] COLONOSCOPY FLX DX W/COLLJ SPEC WHEN PFRMD  2005 3/09    abby --egd capsule colon (NEG)        Current Medications  Current Outpatient Prescriptions   Medication Sig    Blood Pressure Monitor (BLOOD PRESSURE KIT) kit Use twice daily    JANUMET -1,000 mg TM24 Take 1 Tab by mouth daily.  ibuprofen (MOTRIN) 600 mg tablet Take 1 Tab by mouth every six (6) hours as needed.  hydroxychloroquine (PLAQUENIL) 200 mg tablet Take 200 mg by mouth daily.  ramipril (ALTACE) 10 mg capsule Take 10 mg by mouth daily.  hydroCHLOROthiazide (HYDRODIURIL) 12.5 mg tablet Take 12.5 mg by mouth daily.  aspirin delayed-release 81 mg tablet Take  by mouth daily.  glipiZIDE (GLUCOTROL) 10 mg tablet Take 10 mg by mouth two (2) times a day.  ramipril (ALTACE) 10 mg capsule TAKE ONE CAPSULE BY MOUTH DAILY    colesevelam (WELCHOL) 625 mg tablet Take 1,875 mg by mouth two (2) times daily (with meals). No current facility-administered medications for this visit.         Allergies/Drug Reactions  Allergies   Allergen Reactions    Penicillins Hives        Family History  Family History   Problem Relation Age of Onset    Diabetes Mother     Heart Disease Father     Heart Attack Father     Breast Cancer Paternal Aunt     Diabetes Brother     Asthma Brother         Social History  Social History     Social History    Marital status:      Spouse name: N/A    Number of children: N/A    Years of education: N/A     Occupational History    Not on file. Social History Main Topics    Smoking status: Never Smoker    Smokeless tobacco: Never Used    Alcohol use No    Drug use: No    Sexual activity: Yes     Partners: Male     Other Topics Concern    Not on file     Social History Narrative    ** Merged History Encounter **            Review of Systems  General ROS: negative  Respiratory ROS: no cough, shortness of breath, or wheezing  Cardiovascular ROS: no chest pain or dyspnea on exertion  Gastrointestinal ROS: no abdominal pain, change in bowel habits, or black or bloody stools  Genito-Urinary ROS: no dysuria, trouble voiding, or hematuria  Musculoskeletal ROS: negative  Neurological ROS: negative      Physical Exam  Vital signs:   Vitals:    12/04/17 1123   BP: 135/75   Pulse: 88   Resp: 20   Temp: 98 °F (36.7 °C)   TempSrc: Oral   SpO2: 98%   Weight: 211 lb 12.8 oz (96.1 kg)   Height: 5' 2\" (1.575 m)       General: alert, oriented, not in distress  Chest/Lungs: clear breath sounds, no wheezing or crackles  Heart: normal rate, regular rhythm, no murmur  Abdomen: soft, non-distended, non-tender, normal bowel sounds, no organomegaly, no masses  Extremities: no focal deformities, no edema  Foot exam: no open cuts, ulcers or wounds. DP full and equal. Sensation intact with 5.07 g monofilament wire bilaterally. Laboratory/Tests:  Component      Latest Ref Rng & Units 11/20/2017 11/20/2017 11/20/2017 11/20/2017          11:46 AM 11:45 AM 11:45 AM 11:45 AM   WBC      4.6 - 13.2 K/uL       RBC      4.20 - 5.30 M/uL       HGB      12.0 - 16.0 g/dL       HCT      35.0 - 45.0 %       MCV      74.0 - 97.0 FL       MCH      24.0 - 34.0 PG       MCHC      31.0 - 37.0 g/dL       RDW      11.6 - 14.5 %       PLATELET      977 - 214 K/uL       MPV      9.2 - 11.8 FL       NEUTROPHILS      40 - 73 %       LYMPHOCYTES      21 - 52 %       MONOCYTES      3 - 10 %       EOSINOPHILS      0 - 5 %       BASOPHILS      0 - 2 %       ABS.  NEUTROPHILS      1.8 - 8.0 K/UL       ABS. LYMPHOCYTES      0.9 - 3.6 K/UL       ABS. MONOCYTES      0.05 - 1.2 K/UL       ABS. EOSINOPHILS      0.0 - 0.4 K/UL       ABS. BASOPHILS      0.0 - 0.06 K/UL       DF             Sodium      136 - 145 mmol/L    141   Potassium      3.5 - 5.5 mmol/L    4.3   Chloride      100 - 108 mmol/L    107   CO2      21 - 32 mmol/L    29   Anion gap      3.0 - 18 mmol/L    5   Glucose      74 - 99 mg/dL    114 (H)   BUN      7.0 - 18 MG/DL    21 (H)   Creatinine      0.6 - 1.3 MG/DL    1.02   BUN/Creatinine ratio      12 - 20      21 (H)   GFR est AA      >60 ml/min/1.73m2    >60   GFR est non-AA      >60 ml/min/1.73m2    54 (L)   Calcium      8.5 - 10.1 MG/DL    9.3   Bilirubin, total      0.2 - 1.0 MG/DL    0.4   ALT (SGPT)      13 - 56 U/L    27   AST      15 - 37 U/L    14 (L)   Alk. phosphatase      45 - 117 U/L    64   Protein, total      6.4 - 8.2 g/dL    8.1   Albumin      3.4 - 5.0 g/dL    3.6   Globulin      2.0 - 4.0 g/dL    4.5 (H)   A-G Ratio      0.8 - 1.7      0.8   Cholesterol, total      <200 MG/DL       Triglyceride      <150 MG/DL       HDL Cholesterol      40 - 60 MG/DL       LDL, calculated      0 - 100 MG/DL       VLDL, calculated      MG/DL       CHOL/HDL Ratio      0 - 5.0         Microalbumin,urine random      0 - 3.0 MG/DL 0.40      Creatinine, urine      30 - 125 mg/dL 100.47      Microalbumin/Creat.  Ratio      0 - 30 mg/g <4      Hemoglobin A1c, (calculated)      4.2 - 5.6 %   7.5 (H)    Est. average glucose      mg/dL   169    HCV Ab      0.0 - 0.9 s/co ratio  <0.1       Component      Latest Ref Rng & Units 11/20/2017 11/20/2017          11:45 AM 11:45 AM   WBC      4.6 - 13.2 K/uL  6.6   RBC      4.20 - 5.30 M/uL  4.90   HGB      12.0 - 16.0 g/dL  12.3   HCT      35.0 - 45.0 %  38.9   MCV      74.0 - 97.0 FL  79.4   MCH      24.0 - 34.0 PG  25.1   MCHC      31.0 - 37.0 g/dL  31.6   RDW      11.6 - 14.5 %  14.7 (H)   PLATELET      783 - 797 K/uL  321   MPV      9.2 - 11.8 FL  9.6 NEUTROPHILS      40 - 73 %  64   LYMPHOCYTES      21 - 52 %  27   MONOCYTES      3 - 10 %  6   EOSINOPHILS      0 - 5 %  2   BASOPHILS      0 - 2 %  1   ABS. NEUTROPHILS      1.8 - 8.0 K/UL  4.2   ABS. LYMPHOCYTES      0.9 - 3.6 K/UL  1.8   ABS. MONOCYTES      0.05 - 1.2 K/UL  0.4   ABS. EOSINOPHILS      0.0 - 0.4 K/UL  0.2   ABS. BASOPHILS      0.0 - 0.06 K/UL  0.0   DF        AUTOMATED   Sodium      136 - 145 mmol/L     Potassium      3.5 - 5.5 mmol/L     Chloride      100 - 108 mmol/L     CO2      21 - 32 mmol/L     Anion gap      3.0 - 18 mmol/L     Glucose      74 - 99 mg/dL     BUN      7.0 - 18 MG/DL     Creatinine      0.6 - 1.3 MG/DL     BUN/Creatinine ratio      12 - 20       GFR est AA      >60 ml/min/1.73m2     GFR est non-AA      >60 ml/min/1.73m2     Calcium      8.5 - 10.1 MG/DL     Bilirubin, total      0.2 - 1.0 MG/DL     ALT (SGPT)      13 - 56 U/L     AST      15 - 37 U/L     Alk. phosphatase      45 - 117 U/L     Protein, total      6.4 - 8.2 g/dL     Albumin      3.4 - 5.0 g/dL     Globulin      2.0 - 4.0 g/dL     A-G Ratio      0.8 - 1.7       Cholesterol, total      <200 MG/    Triglyceride      <150 MG/    HDL Cholesterol      40 - 60 MG/DL 76 (H)    LDL, calculated      0 - 100 MG/DL 61.6    VLDL, calculated      MG/DL 22.4    CHOL/HDL Ratio      0 - 5.0   2.1    Microalbumin,urine random      0 - 3.0 MG/DL     Creatinine, urine      30 - 125 mg/dL     Microalbumin/Creat. Ratio      0 - 30 mg/g     Hemoglobin A1c, (calculated)      4.2 - 5.6 %     Est. average glucose      mg/dL     HCV Ab      0.0 - 0.9 s/co ratio       Assessment/Plan:      1. Type 2 diabetes mellitus without complication, without long-term current use of insulin (HCC)  - needs better control  - continue Janumet and glipizide  - at least 30 minutes of moderate exercise 5 times per week  - calorie-controlled diet  - check HbA1c in 3 months  -  DIABETES FOOT EXAM    2.  Essential hypertension  - controlled  - continue current meds  - low salt diet  - Blood Pressure Monitor (BLOOD PRESSURE KIT) kit; Use twice daily  Dispense: 1 Kit; Refill: 0    3. Systemic lupus erythematosus, unspecified SLE type, unspecified organ involvement status ([facility]  - stable  - continue Plaquenil  - rheumatology follow-up    4. Irritable bowel syndrome with diarrhea  - stable  - continue Welchol    Obtain records from GI, rheumatology and ophthalmology. Follow-up Disposition:  Return in about 3 months (around 3/4/2018) for rov. I have discussed the diagnosis with the patient and the intended plan as seen in the above orders. The patient has received an after-visit summary and questions were answered concerning future plans. I have discussed medication side effects and warnings with the patient as well. I have reviewed the plan of care with the patient, accepted their input and they are in agreement with the treatment goals.        Sarita Perez MD  December 4, 2017

## 2017-12-27 ENCOUNTER — OFFICE VISIT (OUTPATIENT)
Dept: FAMILY MEDICINE CLINIC | Age: 67
End: 2017-12-27

## 2017-12-27 VITALS
TEMPERATURE: 96.9 F | BODY MASS INDEX: 38.9 KG/M2 | DIASTOLIC BLOOD PRESSURE: 64 MMHG | RESPIRATION RATE: 15 BRPM | WEIGHT: 211.4 LBS | HEART RATE: 90 BPM | SYSTOLIC BLOOD PRESSURE: 140 MMHG | HEIGHT: 62 IN | OXYGEN SATURATION: 97 %

## 2017-12-27 DIAGNOSIS — J40 BRONCHITIS: Primary | ICD-10-CM

## 2017-12-27 DIAGNOSIS — J01.00 ACUTE NON-RECURRENT MAXILLARY SINUSITIS: ICD-10-CM

## 2017-12-27 RX ORDER — CARVEDILOL 6.25 MG/1
6.25 TABLET ORAL 2 TIMES DAILY
Refills: 5 | COMMUNITY
Start: 2017-12-02 | End: 2019-07-30 | Stop reason: SDUPTHER

## 2017-12-27 RX ORDER — LEVOFLOXACIN 500 MG/1
500 TABLET, FILM COATED ORAL DAILY
Qty: 5 TAB | Refills: 0 | Status: SHIPPED | OUTPATIENT
Start: 2017-12-27 | End: 2018-01-01

## 2017-12-27 NOTE — PROGRESS NOTES
1. Have you been to the ER, urgent care clinic since your last visit? Hospitalized since your last visit? No    2. Have you seen or consulted any other health care providers outside of the [address] [place] [address] since your last visit? Include any pap smears or colon screening.  No

## 2017-12-27 NOTE — PROGRESS NOTES
History of Present Illness  Darnell Hurst is a 74 y.o. female who presents today for management of    Chief Complaint   Patient presents with    Sinus Pain       Sinus Pain  Patient complains of sinus pressure. Symptoms include cough described as productive of green/yellow sputum, with shortness of breath and nasal congestion with chills. Onset of symptoms was 4 days ago, unchanged since that time. She is drinking plenty of fluids. .  Past history is significant for pneumonia. Patient is non-smoker    Problem List  Patient Active Problem List    Diagnosis Date Noted    Class 2 obesity due to excess calories with serious comorbidity and body mass index (BMI) of 38.0 to 38.9 in adult 12/04/2017    ACP (advance care planning) 11/20/2017    History of breast cancer 11/20/2017    SLE (systemic lupus erythematosus) ([address]     IBS (irritable bowel syndrome)     Diabetes mellitus, type 2 ([address]     HTN (hypertension) 04/05/2010       Past Medical History  Past Medical History:   Diagnosis Date    Angina pectoris     w/ silent ischemia    Breast cancer ([address]     Cancer ([address] 1987    breast    Chronic insomnia     Diabetes ([address]     Diabetes mellitus type II 2003    Exogenous obesity     HTN (hypertension) 4/5/2010    Hypertension     IBS (irritable bowel syndrome)     SLE (systemic lupus erythematosus) ([address]         Surgical History  Past Surgical History:   Procedure Laterality Date    EGD  02/20/09    HX BREAST BIOPSY  2003    right breast neg.     HX BREAST RECONSTRUCTION  1988    left breast    HX BREAST RECONSTRUCTION Left     HX BREAST REDUCTION Right 1987    HX CHOLECYSTECTOMY  1990    HX CHOLECYSTECTOMY      HX HEART CATHETERIZATION  11/28/03    left, normal    HX MASTECTOMY  1987    left breast    HX MASTECTOMY Left     DC COLONOSCOPY FLX DX W/COLLJ SPEC WHEN PFRMD  2005 3/09    abby --egd capsule colon (NEG)        Current Medications  Current Outpatient Prescriptions   Medication Sig    levoFLOXacin (LEVAQUIN) 500 mg tablet Take 1 Tab by mouth daily for 5 days.  Blood Pressure Monitor (BLOOD PRESSURE KIT) kit Use twice daily    JANUMET -1,000 mg TM24 Take 1 Tab by mouth daily.  ibuprofen (MOTRIN) 600 mg tablet Take 1 Tab by mouth every six (6) hours as needed.  hydroxychloroquine (PLAQUENIL) 200 mg tablet Take 200 mg by mouth daily.  ramipril (ALTACE) 10 mg capsule Take 10 mg by mouth daily.  hydroCHLOROthiazide (HYDRODIURIL) 12.5 mg tablet Take 12.5 mg by mouth daily.  aspirin delayed-release 81 mg tablet Take  by mouth daily.  glipiZIDE (GLUCOTROL) 10 mg tablet Take 10 mg by mouth two (2) times a day.  colesevelam (WELCHOL) 625 mg tablet Take 1,875 mg by mouth two (2) times daily (with meals).  carvedilol (COREG) 6.25 mg tablet Take 6.25 mg by mouth two (2) times a day. No current facility-administered medications for this visit. Allergies/Drug Reactions  Allergies   Allergen Reactions    Latex Hives    Penicillins Hives        Family History  Family History   Problem Relation Age of Onset    Diabetes Mother     Heart Disease Father     Heart Attack Father     Breast Cancer Paternal Aunt     Diabetes Brother     Asthma Brother         Social History  Social History     Social History    Marital status:      Spouse name: N/A    Number of children: N/A    Years of education: N/A     Occupational History    Not on file.      Social History Main Topics    Smoking status: Never Smoker    Smokeless tobacco: Never Used    Alcohol use No    Drug use: No    Sexual activity: Yes     Partners: Male     Other Topics Concern    Not on file     Social History Narrative    ** Merged History Encounter **            Review of Systems  Negative except as mentioned in HPI      Physical Exam  Vital signs:   Vitals:    12/27/17 1113   BP: 140/64   Pulse: 90   Resp: 15   Temp: 96.9 °F (36.1 °C)   TempSrc: Oral   SpO2: 97%   Weight: 211 lb 6.4 oz (95.9 kg)   Height: 5' 2\" (1.575 m)       General: alert, oriented, not in distress  HEENT: ears normal, non-congested turbinates, pink throat, no exudates, no enlarged lymph nodes  Chest/Lungs: clear breath sounds, no wheezing or crackles  Heart: normal rate, regular rhythm, no murmur  Abdomen: soft, non-distended, non-tender, normal bowel sounds, no organomegaly, no masses  Extremities: no focal deformities, no edema      Assessment/Plan:          ICD-10-CM ICD-9-CM    1. Bronchitis J40 490 levoFLOXacin (LEVAQUIN) 500 mg tablet   2. Acute non-recurrent maxillary sinusitis J01.00 461.0 levoFLOXacin (LEVAQUIN) 500 mg tablet     Increase oral fluid intake  Rest  Tylenol as needed      Follow-up Disposition:  Return if symptoms worsen or fail to improve. I have discussed the diagnosis with the patient and the intended plan as seen in the above orders. The patient has received an after-visit summary and questions were answered concerning future plans. I have discussed medication side effects and warnings with the patient as well. I have reviewed the plan of care with the patient, accepted their input and they are in agreement with the treatment goals.        Marialuisa Gill MD  December 27, 2017

## 2017-12-27 NOTE — MR AVS SNAPSHOT
Visit Information Date & Time Provider Department Dept. Phone Encounter #  
 12/27/2017 11:00 AM Maninder Gonzáles, Amada Condon [id number] Follow-up Instructions Return if symptoms worsen or fail to improve. Your Appointments 3/5/2018 10:00 AM  
ROUTINE CARE with Maninder Gonzáles MD  
[facility] [address] [facility]) Appt Note: 3 months rov  
 [facility] [address] [facility] [address] [facility] [address] [place] [address] Upcoming Health Maintenance Date Due  
 EYE EXAM RETINAL OR DILATED Q1 10/29/1962 FOBT Q 1 YEAR AGE 50-75 10/29/2002 ZOSTER VACCINE AGE 60> 8/29/2012 GLAUCOMA SCREENING Q2Y 10/29/2017 OSTEOPOROSIS SCREENING (DEXA) 10/29/2017 HEMOGLOBIN A1C Q6M 5/20/2018 Pneumococcal 65+ Low/Medium Risk (2 of 2 - PPSV23) 11/20/2018 MICROALBUMIN Q1 11/20/2018 LIPID PANEL Q1 11/20/2018 MEDICARE YEARLY EXAM 11/21/2018 FOOT EXAM Q1 12/4/2018 DTaP/Tdap/Td series (2 - Td) 4/5/2020 Allergies as of 12/27/2017  Review Complete On: 12/27/2017 By: Maninder Gonzáles MD  
  
 Severity Noted Reaction Type Reactions Latex  12/27/2017    Hives Penicillins  04/05/2010    Hives Current Immunizations  Reviewed on 11/20/2017 Name Date Hepatitis A Vaccine 10/20/2006, 1/9/2006 Influenza High Dose Vaccine PF 8/25/2017 Influenza Vaccine Whole 10/5/2009 Pneumococcal Conjugate (PCV-13) 11/20/2017 TD Vaccine 4/5/2006 TDAP Vaccine 4/5/2010 ZZZ-RETIRED (DO NOT USE) Pneumococcal Vaccine (Unspecified Type) 2/18/2004 Not reviewed this visit You Were Diagnosed With   
  
 Codes Comments Bronchitis    -  Primary ICD-10-CM: L27 ICD-9-CM: 433 Acute non-recurrent maxillary sinusitis     ICD-10-CM: J01.00 ICD-9-CM: 461.0 Vitals BP Pulse Temp Resp Height(growth percentile) Weight(growth percentile) 140/64 90 96.9 °F (36.1 °C) (Oral) 15 5' 2\" (1.575 m) 211 lb 6.4 oz (95.9 kg) SpO2 BMI OB Status Smoking Status 97% 38.67 kg/m2 Menopause Never Smoker Vitals History BMI and BSA Data Body Mass Index Body Surface Area  
 38.67 kg/m 2 2.05 m 2 Preferred Pharmacy Pharmacy Name Phone [facility] [address] [facility] [address]. [phone] Your Updated Medication List  
  
   
This list is accurate as of: 12/27/17 11:26 AM.  Always use your most recent med list. ALTACE 10 mg capsule Generic drug:  ramipril Take 10 mg by mouth daily. aspirin delayed-release 81 mg tablet Take  by mouth daily. Blood Pressure Monitor Kit Commonly known as:  BLOOD PRESSURE KIT Use twice daily  
  
 carvedilol 6.25 mg tablet Commonly known as:  Sharolyn Smith Take 6.25 mg by mouth two (2) times a day. glipiZIDE 10 mg tablet Commonly known as:  Natacha Barrier Take 10 mg by mouth two (2) times a day. hydroCHLOROthiazide 12.5 mg tablet Commonly known as:  HYDRODIURIL Take 12.5 mg by mouth daily. ibuprofen 600 mg tablet Commonly known as:  MOTRIN Take 1 Tab by mouth every six (6) hours as needed. JANUMET -1,000 mg Tm24 Generic drug:  SITagliptin-metFORMIN Take 1 Tab by mouth daily. levoFLOXacin 500 mg tablet Commonly known as:  Sweetie Schneiders Take 1 Tab by mouth daily for 5 days. PLAQUENIL 200 mg tablet Generic drug:  hydroxychloroquine Take 200 mg by mouth daily. WELCHOL 625 mg tablet Generic drug:  colesevelam  
Take 1,875 mg by mouth two (2) times daily (with meals). Prescriptions Sent to Pharmacy Refills  
 levoFLOXacin (LEVAQUIN) 500 mg tablet 0 Sig: Take 1 Tab by mouth daily for 5 days. Class: Normal  
 Pharmacy: [facility]/pharmacy #[id number]- [address].  #: [phone] Route: Oral  
  
Follow-up Instructions Return if symptoms worsen or fail to improve. Introducing \[facility] [place] [facility]\"" & HEALTH SERVICES! Dear Consuelo Lee: Thank you for requesting a [employer] account. Our records indicate that you already have an active [employer] account. You can access your account anytime at [email or web address] Did you know that you can access your hospital and ER discharge instructions at any time in [employer]? You can also review all of your test results from your hospital stay or ER visit. Additional Information If you have questions, please visit the Frequently Asked Questions section of the [employer] website at [email or web address] Remember, [employer] is NOT to be used for urgent needs. For medical emergencies, dial 911. Now available from your iPhone and Android! Please provide this summary of care documentation to your next provider. Your primary care clinician is listed as Ivis Metcalf. If you have any questions after today's visit, please call [phone].

## 2018-01-12 PROBLEM — M35.9 CONNECTIVE TISSUE DISORDER (HCC): Status: ACTIVE | Noted: 2018-01-12

## 2018-01-29 ENCOUNTER — HOSPITAL ENCOUNTER (OUTPATIENT)
Dept: MAMMOGRAPHY | Age: 68
Discharge: HOME | End: 2018-01-29
Attending: INTERNAL MEDICINE
Payer: COMMERCIAL

## 2018-01-29 DIAGNOSIS — Z12.31 VISIT FOR SCREENING MAMMOGRAM: ICD-10-CM

## 2018-01-29 PROCEDURE — 77067 SCR MAMMO BI INCL CAD: CPT

## 2018-03-05 ENCOUNTER — OFFICE VISIT (OUTPATIENT)
Dept: FAMILY MEDICINE CLINIC | Age: 68
End: 2018-03-05

## 2018-03-05 VITALS
WEIGHT: 211 LBS | TEMPERATURE: 97.6 F | OXYGEN SATURATION: 97 % | RESPIRATION RATE: 20 BRPM | DIASTOLIC BLOOD PRESSURE: 73 MMHG | SYSTOLIC BLOOD PRESSURE: 135 MMHG | HEART RATE: 84 BPM | HEIGHT: 62 IN | BODY MASS INDEX: 38.83 KG/M2

## 2018-03-05 DIAGNOSIS — E11.9 TYPE 2 DIABETES MELLITUS WITHOUT COMPLICATION, WITHOUT LONG-TERM CURRENT USE OF INSULIN (HCC): Primary | ICD-10-CM

## 2018-03-05 DIAGNOSIS — K58.0 IRRITABLE BOWEL SYNDROME WITH DIARRHEA: ICD-10-CM

## 2018-03-05 DIAGNOSIS — M35.9 CONNECTIVE TISSUE DISORDER (HCC): ICD-10-CM

## 2018-03-05 DIAGNOSIS — I10 ESSENTIAL HYPERTENSION: ICD-10-CM

## 2018-03-05 LAB — HBA1C MFR BLD HPLC: 7.1 %

## 2018-03-05 NOTE — PROGRESS NOTES
1. Have you been to the ER, urgent care clinic since your last visit? Hospitalized since your last visit? No    2. Have you seen or consulted any other health care providers outside of the [address] [place] [address] since your last visit? Include any pap smears or colon screening.  No  \

## 2018-03-05 NOTE — MR AVS SNAPSHOT
[address] [id number] [facility] [address] [place] [address] [id number] 
[phone] Patient: Yevgeniy Cheatham MRN: [id number] :1950 Visit Information Date & Time Provider Department Dept. Phone Encounter #  
 3/5/2018 10:00 AM Will Barnes, 47 Radha Condon [id number] Follow-up Instructions Return in about 3 months (around 2018) for rov. Upcoming Health Maintenance Date Due ZOSTER VACCINE AGE 60> 2012 EYE EXAM RETINAL OR DILATED Q1 2017 HEMOGLOBIN A1C Q6M 2018 GLAUCOMA SCREENING Q2Y 2018 Pneumococcal 65+ Low/Medium Risk (2 of 2 - PPSV23) 2018 MICROALBUMIN Q1 2018 LIPID PANEL Q1 2018 MEDICARE YEARLY EXAM 2018 FOOT EXAM Q1 2018 BREAST CANCER SCRN MAMMOGRAM 2020 DTaP/Tdap/Td series (2 - Td) 2020 COLONOSCOPY 2021 Allergies as of 3/5/2018  Review Complete On: 3/5/2018 By: Will Barnes MD  
  
 Severity Noted Reaction Type Reactions Latex  2017    Hives Penicillins  2010    Hives Current Immunizations  Reviewed on 2017 Name Date Hepatitis A Vaccine 10/20/2006, 2006 Influenza High Dose Vaccine PF 2017 Influenza Vaccine Whole 10/5/2009 Pneumococcal Conjugate (PCV-13) 2017 TD Vaccine 2006 TDAP Vaccine 2010 ZZZ-RETIRED (DO NOT USE) Pneumococcal Vaccine (Unspecified Type) 2004 Not reviewed this visit You Were Diagnosed With   
  
 Codes Comments Type 2 diabetes mellitus without complication, without long-term current use of insulin (HCC)    -  Primary ICD-10-CM: E11.9 ICD-9-CM: 250.00 Essential hypertension     ICD-10-CM: I10 
ICD-9-CM: 401.9 Connective tissue disorder ([facility] [address]     ICD-10-CM: M35.9 ICD-9-CM: 710.9 Irritable bowel syndrome with diarrhea     ICD-10-CM: K58.0 ICD-9-CM: 382.5 Class 2 obesity due to excess calories with serious comorbidity and body mass index (BMI) of 38.0 to 38.9 in adult     ICD-10-CM: E66.09, Z68.38 
ICD-9-CM: 278.00, V85.38 Vitals BP Pulse Temp Resp Height(growth percentile) Weight(growth percentile) 135/73 84 97.6 °F (36.4 °C) (Oral) 20 5' 2\" (1.575 m) 211 lb (95.7 kg) SpO2 BMI OB Status Smoking Status 97% 38.59 kg/m2 Menopause Never Smoker BMI and BSA Data Body Mass Index Body Surface Area 38.59 kg/m 2 2.05 m 2 Preferred Pharmacy Pharmacy Name Phone [facility] Huma Levy8. [phone] Your Updated Medication List  
  
   
This list is accurate as of 3/5/18 10:26 AM.  Always use your most recent med list. ALTACE 10 mg capsule Generic drug:  ramipril Take 10 mg by mouth daily. aspirin delayed-release 81 mg tablet Take  by mouth daily. Blood Pressure Monitor Kit Commonly known as:  BLOOD PRESSURE KIT Use twice daily  
  
 carvedilol 6.25 mg tablet Commonly known as:  Jasper Si Take 6.25 mg by mouth two (2) times a day. glipiZIDE 10 mg tablet Commonly known as:  Shannon Dasen Take 10 mg by mouth two (2) times a day. hydroCHLOROthiazide 12.5 mg tablet Commonly known as:  HYDRODIURIL Take 12.5 mg by mouth daily. ibuprofen 600 mg tablet Commonly known as:  MOTRIN Take 1 Tab by mouth every six (6) hours as needed. JANUMET -1,000 mg Tm24 Generic drug:  SITagliptin-metFORMIN Take 1 Tab by mouth daily. PLAQUENIL 200 mg tablet Generic drug:  hydroxychloroquine Take 200 mg by mouth daily. WELCHOL 625 mg tablet Generic drug:  colesevelam  
Take 1,875 mg by mouth two (2) times daily (with meals). We Performed the Following AMB POC HEMOGLOBIN A1C [84115 CPT(R)] Follow-up Instructions Return in about 3 months (around 6/5/2018) for rov.   
  
To-Do List   
 01/30/2019 7:00 AM  
  Appointment with [facility] LILI DANILO BX RM 1 at [facility] ([phone]) PAYMENT  For Non-Medicare patients - $15.00 will be collected from you at the time of your exam.  You will be billed $35.00 from the reading [facility]. OUTSIDE FILMS  - Any outside films related to the study being scheduled should be brought with you on the day of the exam.  If this cannot be done there may be a delay in the reading of the study. MEDICATIONS  - Patient must bring a complete list of all medications currently taking to include prescriptions, over-the-counter meds, herbals, vitamins & any dietary supplements  GENERAL INSTRUCTIONS  - On the day of your exam do not use any bath powder, deodorant or lotions on the armpit area. -Tenderness of breasts may cause an increase of discomfort during procedure. If you are experiencing breast tenderness on the day of your appointment and would like to reschedule, please call [phone]. Introducing [facility] & HEALTH SERVICES! Dear Claudell Easter: Thank you for requesting a [employer] account. Our records indicate that you already have an active [employer] account. You can access your account anytime at [email or web address] Did you know that you can access your hospital and ER discharge instructions at any time in [employer]? You can also review all of your test results from your hospital stay or ER visit. Additional Information If you have questions, please visit the Frequently Asked Questions section of the [employer] website at [email or web address] Remember, [employer] is NOT to be used for urgent needs. For medical emergencies, dial 911. Now available from your iPhone and Android! Please provide this summary of care documentation to your next provider. Your primary care clinician is listed as Fred Olvera. If you have any questions after today's visit, please call [phone].

## 2018-03-05 NOTE — PROGRESS NOTES
History of Present Illness  Lilly Seen is a 74 y.o. female who presents today for management of    Chief Complaint   Patient presents with    Diabetes    Hypertension    Irritable Bowel Syndrome       Irritable Bowel Syndrome:  Patient has probably irritable bowel syndrome. Symptoms onset: several years ago. She describes symptoms as none at present. Patient denies none. Course to date has been well controlled. Evaluation to date has been colonoscopy. Treatment to date has been Welchol. Response to treatment plan: well controlled. Diabetes Mellitus:  She has diabetes mellitus, and  hypertension and hyperlipidemia. Diabetic ROS - medication compliance: compliant all of the time, diabetic diet compliance: compliant all of the time, home glucose monitoring: fasting values range , further diabetic ROS: no polyuria or polydipsia, no chest pain, dyspnea or TIA's, no numbness, tingling or pain in extremities, last eye exam approximately 6 months ago. Lab review: orders written for new lab studies as appropriate; see orders. Cardiovascular Review:  The patient has hypertension and hyperlipidemia. Diet and Lifestyle: generally follows a low fat low cholesterol diet, generally follows a low sodium diet, exercises regularly, nonsmoker  Home BP Monitoring: is not measured at home. Pertinent ROS: taking medications as instructed, no medication side effects noted, no TIA's, no chest pain on exertion, no dyspnea on exertion, no swelling of ankles.      Problem List  Patient Active Problem List    Diagnosis Date Noted    Connective tissue disorder ([facility] [address] 01/12/2018    Class 2 obesity due to excess calories with serious comorbidity and body mass index (BMI) of 38.0 to 38.9 in adult 12/04/2017    ACP (advance care planning) 11/20/2017    History of breast cancer 11/20/2017    IBS (irritable bowel syndrome)     Diabetes mellitus, type 2 ([address]     HTN (hypertension) 04/05/2010       Past Medical History  Past Medical History:   Diagnosis Date    Angina pectoris     w/ silent ischemia    Breast cancer ([facility] [address]     Cancer ([facility] [address] 1987    breast    Chronic insomnia     Diabetes ([facility] [address]     Diabetes mellitus type II 2003    Exogenous obesity     HTN (hypertension) 4/5/2010    Hypertension     IBS (irritable bowel syndrome)     SLE (systemic lupus erythematosus) ([facility] [address]         Surgical History  Past Surgical History:   Procedure Laterality Date    EGD  02/20/09    HX BREAST BIOPSY  2003    right breast neg.  HX BREAST RECONSTRUCTION  1988    left breast    HX BREAST RECONSTRUCTION Left     HX BREAST REDUCTION Right 1987    HX CHOLECYSTECTOMY  1990    HX CHOLECYSTECTOMY      HX HEART CATHETERIZATION  11/28/03    left, normal    HX MASTECTOMY  1987    left breast    HX MASTECTOMY Left     IL COLONOSCOPY FLX DX W/COLLJ SPEC WHEN PFRMD  2005 3/09    abby --egd capsule colon (NEG)        Current Medications  Current Outpatient Prescriptions   Medication Sig    carvedilol (COREG) 6.25 mg tablet Take 6.25 mg by mouth two (2) times a day.  Blood Pressure Monitor (BLOOD PRESSURE KIT) kit Use twice daily    JANUMET -1,000 mg TM24 Take 1 Tab by mouth daily.  hydroxychloroquine (PLAQUENIL) 200 mg tablet Take 200 mg by mouth daily.  ramipril (ALTACE) 10 mg capsule Take 10 mg by mouth daily.  hydroCHLOROthiazide (HYDRODIURIL) 12.5 mg tablet Take 12.5 mg by mouth daily.  aspirin delayed-release 81 mg tablet Take  by mouth daily.  glipiZIDE (GLUCOTROL) 10 mg tablet Take 10 mg by mouth two (2) times a day.  colesevelam (WELCHOL) 625 mg tablet Take 1,875 mg by mouth two (2) times daily (with meals).  ibuprofen (MOTRIN) 600 mg tablet Take 1 Tab by mouth every six (6) hours as needed. No current facility-administered medications for this visit.         Allergies/Drug Reactions  Allergies   Allergen Reactions    Latex Hives    Penicillins Hives        Family History  Family History   Problem Relation Age of Onset    Diabetes Mother     Heart Disease Father     Heart Attack Father     Breast Cancer Paternal Aunt     Diabetes Brother     Asthma Brother         Social History  Social History     Social History    Marital status:      Spouse name: N/A    Number of children: N/A    Years of education: N/A     Occupational History    Not on file.      Social History Main Topics    Smoking status: Never Smoker    Smokeless tobacco: Never Used    Alcohol use No    Drug use: No    Sexual activity: Yes     Partners: Male     Other Topics Concern    Not on file     Social History Narrative    ** Merged History Encounter **            Review of Systems  Negative except as mentioned in HPI      Physical Exam  Vital signs:   Vitals:    03/05/18 1008   BP: 135/73   Pulse: 84   Resp: 20   Temp: 97.6 °F (36.4 °C)   TempSrc: Oral   SpO2: 97%   Weight: 211 lb (95.7 kg)   Height: 5' 2\" (1.575 m)       General: alert, oriented, not in distress  Chest/Lungs: clear breath sounds, no wheezing or crackles  Heart: normal rate, regular rhythm, no murmur  Abdomen: soft, non-distended, non-tender, normal bowel sounds, no organomegaly, no masses  Extremities: no focal deformities, no edema    Laboratory/Tests:    Component      Latest Ref Rng & Units 11/20/2017 11/20/2017 11/20/2017          11:46 AM 11:46 AM 11:45 AM   Sodium      136 - 145 mmol/L   141   Potassium      3.5 - 5.5 mmol/L   4.3   Chloride      100 - 108 mmol/L   107   CO2      21 - 32 mmol/L   29   Anion gap      3.0 - 18 mmol/L   5   Glucose      74 - 99 mg/dL   114 (H)   BUN      7.0 - 18 MG/DL   21 (H)   Creatinine      0.6 - 1.3 MG/DL   1.02   BUN/Creatinine ratio      12 - 20     21 (H)   GFR est AA      >60 ml/min/1.73m2   >60   GFR est non-AA      >60 ml/min/1.73m2   54 (L)   Calcium      8.5 - 10.1 MG/DL   9.3   Bilirubin, total      0.2 - 1.0 MG/DL   0.4   ALT (SGPT)      13 - 56 U/L   27   AST      15 - 37 U/L   14 (L)   Alk. phosphatase      45 - 117 U/L   64   Protein, total      6.4 - 8.2 g/dL   8.1   Albumin      3.4 - 5.0 g/dL   3.6   Globulin      2.0 - 4.0 g/dL   4.5 (H)   A-G Ratio      0.8 - 1.7     0.8   Color        YELLOW    Appearance        CLEAR    Specific gravity      1.005 - 1.030    1.020    pH (UA)      5.0 - 8.0    5.0    Protein      NEG mg/dL  NEGATIVE    Glucose      NEG mg/dL  NEGATIVE    Ketone      NEG mg/dL  NEGATIVE    Bilirubin      NEG    NEGATIVE    Blood      NEG    NEGATIVE    Urobilinogen      0.2 - 1.0 EU/dL  0.2    Nitrites      NEG    NEGATIVE    Leukocyte Esterase      NEG    TRACE (A)    Microalbumin,urine random      0 - 3.0 MG/DL 0.40     Creatinine, urine      30 - 125 mg/dL 100.47     Microalbumin/Creat. Ratio      0 - 30 mg/g <4       Component      Latest Ref Rng & Units 11/20/2017 11/20/2017 11/20/2017          11:45 AM 11:45 AM 11:45 AM   WBC      4.6 - 13.2 K/uL      RBC      4.20 - 5.30 M/uL      HGB      12.0 - 16.0 g/dL      HCT      35.0 - 45.0 %      MCV      74.0 - 97.0 FL      MCH      24.0 - 34.0 PG      MCHC      31.0 - 37.0 g/dL      RDW      11.6 - 14.5 %      PLATELET      506 - 620 K/uL      MPV      9.2 - 11.8 FL      NEUTROPHILS      40 - 73 %      LYMPHOCYTES      21 - 52 %      MONOCYTES      3 - 10 %      EOSINOPHILS      0 - 5 %      BASOPHILS      0 - 2 %      ABS. NEUTROPHILS      1.8 - 8.0 K/UL      ABS. LYMPHOCYTES      0.9 - 3.6 K/UL      ABS. MONOCYTES      0.05 - 1.2 K/UL      ABS. EOSINOPHILS      0.0 - 0.4 K/UL      ABS.  BASOPHILS      0.0 - 0.06 K/UL      DF            Cholesterol, total      <200 MG/DL   160   Triglyceride      <150 MG/DL   112   HDL Cholesterol      40 - 60 MG/DL   76 (H)   LDL, calculated      0 - 100 MG/DL   61.6   VLDL, calculated      MG/DL   22.4   CHOL/HDL Ratio      0 - 5.0     2.1   Hemoglobin A1c, (calculated)      4.2 - 5.6 %  7.5 (H)    Est. average glucose      mg/dL  169    HCV Ab      0.0 - 0.9 s/co ratio <0.1 Component      Latest Ref Rng & Units 11/20/2017          11:45 AM   WBC      4.6 - 13.2 K/uL 6.6   RBC      4.20 - 5.30 M/uL 4.90   HGB      12.0 - 16.0 g/dL 12.3   HCT      35.0 - 45.0 % 38.9   MCV      74.0 - 97.0 FL 79.4   MCH      24.0 - 34.0 PG 25.1   MCHC      31.0 - 37.0 g/dL 31.6   RDW      11.6 - 14.5 % 14.7 (H)   PLATELET      951 - 690 K/uL 321   MPV      9.2 - 11.8 FL 9.6   NEUTROPHILS      40 - 73 % 64   LYMPHOCYTES      21 - 52 % 27   MONOCYTES      3 - 10 % 6   EOSINOPHILS      0 - 5 % 2   BASOPHILS      0 - 2 % 1   ABS. NEUTROPHILS      1.8 - 8.0 K/UL 4.2   ABS. LYMPHOCYTES      0.9 - 3.6 K/UL 1.8   ABS. MONOCYTES      0.05 - 1.2 K/UL 0.4   ABS. EOSINOPHILS      0.0 - 0.4 K/UL 0.2   ABS. BASOPHILS      0.0 - 0.06 K/UL 0.0   DF       AUTOMATED   Cholesterol, total      <200 MG/DL    Triglyceride      <150 MG/DL    HDL Cholesterol      40 - 60 MG/DL    LDL, calculated      0 - 100 MG/DL    VLDL, calculated      MG/DL    CHOL/HDL Ratio      0 - 5.0      Hemoglobin A1c, (calculated)      4.2 - 5.6 %    Est. average glucose      mg/dL    HCV Ab      0.0 - 0.9 s/co ratio      POC HbA1c 7.1%    Assessment/Plan:      1. Type 2 diabetes mellitus without complication, without long-term current use of insulin (HCC)  - controlled  - continue current medications  - AMB POC HEMOGLOBIN A1C    2. Essential hypertension  - controlled  - continue current medications  - low salt diet    3. Connective tissue disorder [facility])  - rheumatology follow-up    4. Irritable bowel syndrome with diarrhea  - controlled  - GI follow-up as scheduled    5. Class 2 obesity due to excess calories with serious comorbidity and body mass index (BMI) of 38.0 to 38.9 in adult  - The patient is asked to make an attempt to improve diet and exercise patterns to aid in medical management of this problem. Follow-up Disposition:  Return in about 3 months (around 6/5/2018) for rov.       I have discussed the diagnosis with the patient and the intended plan as seen in the above orders. The patient has received an after-visit summary and questions were answered concerning future plans. I have discussed medication side effects and warnings with the patient as well. I have reviewed the plan of care with the patient, accepted their input and they are in agreement with the treatment goals.        Enrrique Chung MD  March 5, 2018

## 2018-04-26 RX ORDER — RAMIPRIL 10 MG/1
10 CAPSULE ORAL DAILY
Qty: 90 CAP | Refills: 1 | Status: SHIPPED | OUTPATIENT
Start: 2018-04-26 | End: 2018-10-24 | Stop reason: SDUPTHER

## 2018-05-01 DIAGNOSIS — Z91.89 AT RISK FOR CARDIOVASCULAR EVENT: Primary | ICD-10-CM

## 2018-05-01 RX ORDER — ATORVASTATIN CALCIUM 10 MG/1
10 TABLET, FILM COATED ORAL DAILY
Qty: 90 TAB | Refills: 1 | Status: SHIPPED | OUTPATIENT
Start: 2018-05-01 | End: 2018-06-05 | Stop reason: SINTOL

## 2018-05-03 ENCOUNTER — TELEPHONE (OUTPATIENT)
Dept: FAMILY MEDICINE CLINIC | Age: 68
End: 2018-05-03

## 2018-05-03 NOTE — TELEPHONE ENCOUNTER
Patient states she is suppose to start taking   atorvastatin (LIPITOR) 10 mg tablet but she is already taking Welchol 6.25mg. Patient would like to know she discontinue the medication she is already taking for cholesterol before starting atorvastatin.

## 2018-05-07 NOTE — TELEPHONE ENCOUNTER
lvm explaining to stop Welchol and continue to take Lipitor. If patient have questions, advised to call back. This encounter will be closed.

## 2018-05-07 NOTE — TELEPHONE ENCOUNTER
Spoke with the patient and provider. Pt will stay on Welchol for IBS and continue with Lipitor. Pt verbalized understanding, this encounter will be closed.

## 2018-06-05 ENCOUNTER — OFFICE VISIT (OUTPATIENT)
Dept: FAMILY MEDICINE CLINIC | Age: 68
End: 2018-06-05

## 2018-06-05 VITALS
WEIGHT: 208.6 LBS | DIASTOLIC BLOOD PRESSURE: 64 MMHG | TEMPERATURE: 96.9 F | SYSTOLIC BLOOD PRESSURE: 130 MMHG | HEIGHT: 62 IN | OXYGEN SATURATION: 97 % | HEART RATE: 76 BPM | RESPIRATION RATE: 18 BRPM | BODY MASS INDEX: 38.39 KG/M2

## 2018-06-05 DIAGNOSIS — M35.9 CONNECTIVE TISSUE DISORDER (HCC): ICD-10-CM

## 2018-06-05 DIAGNOSIS — E66.01 SEVERE OBESITY (BMI 35.0-39.9): ICD-10-CM

## 2018-06-05 DIAGNOSIS — Z78.0 POSTMENOPAUSAL: ICD-10-CM

## 2018-06-05 DIAGNOSIS — Z85.3 HISTORY OF BREAST CANCER: ICD-10-CM

## 2018-06-05 DIAGNOSIS — E78.5 HYPERLIPIDEMIA, UNSPECIFIED HYPERLIPIDEMIA TYPE: ICD-10-CM

## 2018-06-05 DIAGNOSIS — I10 ESSENTIAL HYPERTENSION: ICD-10-CM

## 2018-06-05 DIAGNOSIS — K58.0 IRRITABLE BOWEL SYNDROME WITH DIARRHEA: ICD-10-CM

## 2018-06-05 DIAGNOSIS — E11.9 TYPE 2 DIABETES MELLITUS WITHOUT COMPLICATION, WITHOUT LONG-TERM CURRENT USE OF INSULIN (HCC): Primary | ICD-10-CM

## 2018-06-05 LAB — HBA1C MFR BLD HPLC: 8.2 %

## 2018-06-05 NOTE — MR AVS SNAPSHOT
[address] [id number] 
[phone] Patient: Briseida Puente MRN: [id number] :1950 Visit Information Date & Time Provider Department Dept. Phone Encounter #  
 2018 10:00 AM Jackeline Christina, 45 Radha Condon [id number] Follow-up Instructions Return in about 3 months (around 2018) for rov. Follow-up and Disposition History Upcoming Health Maintenance Date Due ZOSTER VACCINE AGE 60> 2012 EYE EXAM RETINAL OR DILATED Q1 2017 Influenza Age 5 to Adult 2018 GLAUCOMA SCREENING Q2Y 2018 HEMOGLOBIN A1C Q6M 2018 Pneumococcal 65+ Low/Medium Risk (2 of 2 - PPSV23) 2018 MICROALBUMIN Q1 2018 LIPID PANEL Q1 2018 MEDICARE YEARLY EXAM 2018 FOOT EXAM Q1 2018 BREAST CANCER SCRN MAMMOGRAM 2020 DTaP/Tdap/Td series (2 - Td) 2020 COLONOSCOPY 2021 Allergies as of 2018  Review Complete On: 2018 By: Jackeline Christina MD  
  
 Severity Noted Reaction Type Reactions Latex  2017    Hives Atorvastatin  2018    Nausea Only Penicillins  2010    Hives Current Immunizations  Reviewed on 2017 Name Date Hepatitis A Vaccine 10/20/2006, 2006 Influenza High Dose Vaccine PF 2017 Influenza Vaccine Whole 10/5/2009 Pneumococcal Conjugate (PCV-13) 2017 TD Vaccine 2006 TDAP Vaccine 2010 ZZZ-RETIRED (DO NOT USE) Pneumococcal Vaccine (Unspecified Type) 2004 Not reviewed this visit You Were Diagnosed With   
  
 Codes Comments Type 2 diabetes mellitus without complication, without long-term current use of insulin (HCC)    -  Primary ICD-10-CM: E11.9 ICD-9-CM: 250.00 Essential hypertension     ICD-10-CM: I10 
ICD-9-CM: 401.9 Hyperlipidemia, unspecified hyperlipidemia type     ICD-10-CM: E78.5 ICD-9-CM: 272.4 Severe obesity (BMI 35.0-39.9) (HCC)     ICD-10-CM: E66.01 
ICD-9-CM: 278.01 Connective tissue disorder ([facility] [address]     ICD-10-CM: M35.9 ICD-9-CM: 710.9 Irritable bowel syndrome with diarrhea     ICD-10-CM: K58.0 ICD-9-CM: 549.1 History of breast cancer     ICD-10-CM: Z85.3 ICD-9-CM: V10.3 Postmenopausal     ICD-10-CM: Z78.0 ICD-9-CM: V49.81 Vitals BP Pulse Temp Resp Height(growth percentile) Weight(growth percentile) 130/64 (BP 1 Location: Right arm, BP Patient Position: At rest) 76 96.9 °F (36.1 °C) (Oral) 18 5' 2\" (1.575 m) 208 lb 9.6 oz (94.6 kg) SpO2 BMI OB Status Smoking Status 97% 38.15 kg/m2 Menopause Never Smoker Vitals History BMI and BSA Data Body Mass Index Body Surface Area  
 38.15 kg/m 2 2.03 m 2 Preferred Pharmacy [facility] Phone [facility] [address] [place], [place] [address]. [phone] Your Updated Medication List  
  
   
This list is accurate as of 6/5/18 11:21 AM.  Always use your most recent med list.  
  
  
  
  
 aspirin delayed-release 81 mg tablet Take  by mouth daily. Blood Pressure Monitor Kit Commonly known as:  BLOOD PRESSURE KIT Use twice daily  
  
 carvedilol 6.25 mg tablet Commonly known as:  Lalit Carlisle Take 6.25 mg by mouth two (2) times a day. glipiZIDE 10 mg tablet Commonly known as:  Norm Best Take 10 mg by mouth two (2) times a day. hydroCHLOROthiazide 12.5 mg tablet Commonly known as:  HYDRODIURIL Take 12.5 mg by mouth daily. ibuprofen 600 mg tablet Commonly known as:  MOTRIN Take 1 Tab by mouth every six (6) hours as needed. JANUMET -1,000 mg Tm24 Generic drug:  SITagliptin-metFORMIN Take 1 Tab by mouth daily. PLAQUENIL 200 mg tablet Generic drug:  hydroxychloroquine Take 200 mg by mouth daily. ramipril 10 mg capsule Commonly known as:  ALTACE Take 1 Cap by mouth daily. WELCHOL 625 mg tablet Generic drug:  colesevelam  
Take 1,875 mg by mouth two (2) times daily (with meals). We Performed the Following AMB POC HEMOGLOBIN A1C [13411 CPT(R)] Follow-up Instructions Return in about 3 months (around 9/5/2018) for rov. To-Do List   
 Around 09/03/2018 Lab:  CBC WITH AUTOMATED DIFF   
  
 09/03/2018 Lab:  HEMOGLOBIN A1C WITH EAG Around 09/03/2018 Lab:  LIPID PANEL Around 09/03/2018 Lab:  METABOLIC PANEL, COMPREHENSIVE Around 09/03/2018 Lab:  TSH 3RD GENERATION Around 09/03/2018 Lab:  URINALYSIS W/ RFLX MICROSCOPIC   
  
 01/30/2019 7:00 AM  
  Appointment with [facility] DANILO MISHRA RM 1 at [facility] ([phone]) PAYMENT  For Non-Medicare patients - $15.00 will be collected from you at the time of your exam.  You will be billed $35.00 from the reading [facility]. OUTSIDE FILMS  - Any outside films related to the study being scheduled should be brought with you on the day of the exam.  If this cannot be done there may be a delay in the reading of the study. MEDICATIONS  - Patient must bring a complete list of all medications currently taking to include prescriptions, over-the-counter meds, herbals, vitamins & any dietary supplements  GENERAL INSTRUCTIONS  - On the day of your exam do not use any bath powder, deodorant or lotions on the armpit area. -Tenderness of breasts may cause an increase of discomfort during procedure. If you are experiencing breast tenderness on the day of your appointment and would like to reschedule, please call [phone]. Introducing [facility] & HEALTH SERVICES! Dear Robert Recinos: Thank you for requesting a [id number] account. Our records indicate that you already have an active [id number] account. You can access your account anytime at [email or web address] Did you know that you can access your hospital and ER discharge instructions at any time in [employer]? You can also review all of your test results from your hospital stay or ER visit. Additional Information If you have questions, please visit the Frequently Asked Questions section of the [employer] website at [email or web address] Remember, [employer] is NOT to be used for urgent needs. For medical emergencies, dial 911. Now available from your iPhone and Android! Please provide this summary of care documentation to your next provider. Your primary care clinician is listed as Kt العراقي. If you have any questions after today's visit, please call [phone].

## 2018-06-05 NOTE — PROGRESS NOTES
History of Present Illness  Ericka Kerns is a 74 y.o. female who presents today for management of    Chief Complaint   Patient presents with    Diabetes    Hypertension    Cholesterol Problem    Allergic Reaction     shampoo and conditioner, contact dermatitis        Irritable Bowel Syndrome:  Patient has probably irritable bowel syndrome. Symptoms onset: several years ago. She describes symptoms as none at present. Patient denies none. Course to date has been well controlled. Evaluation to date has been colonoscopy. Treatment to date has been Welchol. Response to treatment plan: well controlled.      Diabetes Mellitus:  She has diabetes mellitus, and  hypertension and hyperlipidemia. Diabetic ROS - medication compliance: compliant all of the time, diabetic diet compliance: compliant all of the time, home glucose monitoring: fasting values range  (Patient was treated with steorids few weeks ago for contact dermatitis to a shampoo), further diabetic ROS: no polyuria or polydipsia, no chest pain, dyspnea or TIA's, no numbness, tingling or pain in extremities, last eye exam approximately 6 months ago. Lab review: orders written for new lab studies as appropriate; see orders.      Cardiovascular Review:  The patient has hypertension and hyperlipidemia. Diet and Lifestyle: generally follows a low fat low cholesterol diet, generally follows a low sodium diet, exercises regularly, nonsmoker  Home BP Monitoring: is not measured at home. Pertinent ROS: taking medications as instructed, no medication side effects noted, no TIA's, no chest pain on exertion, no dyspnea on exertion, no swelling of ankles. She stopped taking Lipitor 3 days ago due to nausea. Connective Tissue Disorder and Chronic Pain:  Patient has arthralgias, primarily affecting the hands and ankles. Symptoms onset: problem is longstanding. Rheumatological ROS: no current joint or muscle symptoms, essentially pain-free.    Response to treatment plan: well controlled. Problem List  Patient Active Problem List    Diagnosis Date Noted    Severe obesity (BMI 35.0-39.9) ([facility] 06/05/2018    Hyperlipidemia 06/05/2018    Connective tissue disorder ([facility] 01/12/2018    ACP (advance care planning) 11/20/2017    History of breast cancer 11/20/2017    IBS (irritable bowel syndrome)     Diabetes mellitus, type 2 ([facility]     HTN (hypertension) 04/05/2010       Past Medical History  Past Medical History:   Diagnosis Date    Angina pectoris     w/ silent ischemia    Breast cancer ([facility]     Cancer ([facility] 1987    breast    Chronic insomnia     Diabetes ([facility]     Diabetes mellitus type II 2003    Exogenous obesity     HTN (hypertension) 4/5/2010    Hypertension     IBS (irritable bowel syndrome)     SLE (systemic lupus erythematosus) ([facility]         Surgical History  Past Surgical History:   Procedure Laterality Date    EGD  02/20/09    HX BREAST BIOPSY  2003    right breast neg.  HX BREAST RECONSTRUCTION  1988    left breast    HX BREAST RECONSTRUCTION Left     HX BREAST REDUCTION Right 1987    HX CHOLECYSTECTOMY  1990    HX CHOLECYSTECTOMY      HX HEART CATHETERIZATION  11/28/03    left, normal    HX MASTECTOMY  1987    left breast    HX MASTECTOMY Left     DC COLONOSCOPY FLX DX W/COLLJ SPEC WHEN PFRMD  2005 3/09    [facility] --egd capsule colon (NEG)        Current Medications  Current Outpatient Prescriptions   Medication Sig    ramipril (ALTACE) 10 mg capsule Take 1 Cap by mouth daily.  carvedilol (COREG) 6.25 mg tablet Take 6.25 mg by mouth two (2) times a day.  Blood Pressure Monitor (BLOOD PRESSURE KIT) kit Use twice daily    JANUMET -1,000 mg TM24 Take 1 Tab by mouth daily.  ibuprofen (MOTRIN) 600 mg tablet Take 1 Tab by mouth every six (6) hours as needed.  hydroxychloroquine (PLAQUENIL) 200 mg tablet Take 200 mg by mouth daily.     hydroCHLOROthiazide (HYDRODIURIL) 12.5 mg tablet Take 12.5 mg by mouth daily.    aspirin delayed-release 81 mg tablet Take  by mouth daily.  glipiZIDE (GLUCOTROL) 10 mg tablet Take 10 mg by mouth two (2) times a day.  colesevelam (WELCHOL) 625 mg tablet Take 1,875 mg by mouth two (2) times daily (with meals). No current facility-administered medications for this visit. Allergies/Drug Reactions  Allergies   Allergen Reactions    Latex Hives    Atorvastatin Nausea Only    Penicillins Hives        Family History  Family History   Problem Relation Age of Onset    Diabetes Mother     Heart Disease Father     Heart Attack Father     Breast Cancer Paternal Aunt     Diabetes Brother     Asthma Brother         Social History  Social History     Social History    Marital status:      Spouse name: N/A    Number of children: N/A    Years of education: N/A     Occupational History    Not on file.      Social History Main Topics    Smoking status: Never Smoker    Smokeless tobacco: Never Used    Alcohol use No    Drug use: No    Sexual activity: Yes     Partners: Male     Other Topics Concern    Not on file     Social History Narrative    ** Merged History Encounter **            Review of Systems  Negative except as mentioned in HPI      Physical Exam  Vital signs:   Vitals:    06/05/18 1053   BP: 130/64   Pulse: 76   Resp: 18   Temp: 96.9 °F (36.1 °C)   TempSrc: Oral   SpO2: 97%   Weight: 208 lb 9.6 oz (94.6 kg)   Height: 5' 2\" (1.575 m)       General: alert, oriented, not in distress  Chest/Lungs: clear breath sounds, no wheezing or crackles  Heart: normal rate, regular rhythm, no murmur  Abdomen: soft, non-distended, non-tender, normal bowel sounds, no organomegaly, no masses  Extremities: no focal deformities, no edema    Laboratory/Tests:  Component      Latest Ref Rng & Units 11/20/2017 11/20/2017 11/20/2017 11/20/2017          11:46 AM 11:45 AM 11:45 AM 11:45 AM   WBC      4.6 - 13.2 K/uL    6.6   RBC      4.20 - 5.30 M/uL    4.90   HGB      12.0 - 16.0 g/dL    12.3   HCT      35.0 - 45.0 %    38.9   MCV      74.0 - 97.0 FL    79.4   MCH      24.0 - 34.0 PG    25.1   MCHC      31.0 - 37.0 g/dL    31.6   RDW      11.6 - 14.5 %    14.7 (H)   PLATELET      158 - 010 K/uL    321   MPV      9.2 - 11.8 FL    9.6   NEUTROPHILS      40 - 73 %    64   LYMPHOCYTES      21 - 52 %    27   MONOCYTES      3 - 10 %    6   EOSINOPHILS      0 - 5 %    2   BASOPHILS      0 - 2 %    1   ABS. NEUTROPHILS      1.8 - 8.0 K/UL    4.2   ABS. LYMPHOCYTES      0.9 - 3.6 K/UL    1.8   ABS. MONOCYTES      0.05 - 1.2 K/UL    0.4   ABS. EOSINOPHILS      0.0 - 0.4 K/UL    0.2   ABS. BASOPHILS      0.0 - 0.06 K/UL    0.0   DF          AUTOMATED   Sodium      136 - 145 mmol/L  141     Potassium      3.5 - 5.5 mmol/L  4.3     Chloride      100 - 108 mmol/L  107     CO2      21 - 32 mmol/L  29     Anion gap      3.0 - 18 mmol/L  5     Glucose      74 - 99 mg/dL  114 (H)     BUN      7.0 - 18 MG/DL  21 (H)     Creatinine      0.6 - 1.3 MG/DL  1.02     BUN/Creatinine ratio      12 - 20    21 (H)     GFR est AA      >60 ml/min/1.73m2  >60     GFR est non-AA      >60 ml/min/1.73m2  54 (L)     Calcium      8.5 - 10.1 MG/DL  9.3     Bilirubin, total      0.2 - 1.0 MG/DL  0.4     ALT (SGPT)      13 - 56 U/L  27     AST      15 - 37 U/L  14 (L)     Alk. phosphatase      45 - 117 U/L  64     Protein, total      6.4 - 8.2 g/dL  8.1     Albumin      3.4 - 5.0 g/dL  3.6     Globulin      2.0 - 4.0 g/dL  4.5 (H)     A-G Ratio      0.8 - 1.7    0.8     Cholesterol, total      <200 MG/DL   160    Triglyceride      <150 MG/DL   112    HDL Cholesterol      40 - 60 MG/DL   76 (H)    LDL, calculated      0 - 100 MG/DL   61.6    VLDL, calculated      MG/DL   22.4    CHOL/HDL Ratio      0 - 5.0     2.1    Microalbumin,urine random      0 - 3.0 MG/DL 0.40      Creatinine, urine      30 - 125 mg/dL 100.47      Microalbumin/Creat. Ratio      0 - 30 mg/g <4          Assessment/Plan:    1.  Type 2 diabetes mellitus without complication, without long-term current use of insulin (HCC)  - improving  - continue current medications  - AMB POC HEMOGLOBIN A1C 8.2% (patient was treated with steroid few weeks ago for an allergic reaction)     2. Essential hypertension  - controlled  - continue current medications  - low salt diet     3. Connective tissue disorder [facility])  - rheumatology follow-up     4. Irritable bowel syndrome with diarrhea  - controlled  - GI follow-up as scheduled     5. Class 2 obesity due to excess calories with serious comorbidity and body mass index (BMI) of 38.0 to 38.9 in adult  - The patient is asked to make an attempt to improve diet and exercise patterns to aid in medical management of this problem. 6. Hyperlipidemia  - stopped atorvastatin due to nausea  - continue Welchol   - last lipid panel within normal limits    7. History of breast cancer, left  - s/p mastectomy  - stable      Follow-up Disposition:  Return in about 3 months (around 9/5/2018) for rov. I have discussed the diagnosis with the patient and the intended plan as seen in the above orders. The patient has received an after-visit summary and questions were answered concerning future plans. I have discussed medication side effects and warnings with the patient as well. I have reviewed the plan of care with the patient, accepted their input and they are in agreement with the treatment goals.        Katerine Loyola MD  June 5, 2018

## 2018-09-05 ENCOUNTER — OFFICE VISIT (OUTPATIENT)
Dept: FAMILY MEDICINE CLINIC | Age: 68
End: 2018-09-05

## 2018-09-05 ENCOUNTER — HOSPITAL ENCOUNTER (OUTPATIENT)
Dept: LAB | Age: 68
Discharge: HOME | End: 2018-09-05
Payer: MEDICARE

## 2018-09-05 VITALS
RESPIRATION RATE: 20 BRPM | BODY MASS INDEX: 38.98 KG/M2 | TEMPERATURE: 97.2 F | SYSTOLIC BLOOD PRESSURE: 123 MMHG | HEIGHT: 62 IN | HEART RATE: 87 BPM | DIASTOLIC BLOOD PRESSURE: 66 MMHG | WEIGHT: 211.8 LBS | OXYGEN SATURATION: 97 %

## 2018-09-05 DIAGNOSIS — I10 ESSENTIAL HYPERTENSION: ICD-10-CM

## 2018-09-05 DIAGNOSIS — E11.9 TYPE 2 DIABETES MELLITUS WITHOUT COMPLICATION, WITHOUT LONG-TERM CURRENT USE OF INSULIN (HCC): ICD-10-CM

## 2018-09-05 DIAGNOSIS — E11.9 TYPE 2 DIABETES MELLITUS WITHOUT COMPLICATION, WITHOUT LONG-TERM CURRENT USE OF INSULIN (HCC): Primary | ICD-10-CM

## 2018-09-05 DIAGNOSIS — K58.0 IRRITABLE BOWEL SYNDROME WITH DIARRHEA: ICD-10-CM

## 2018-09-05 DIAGNOSIS — Z85.3 HISTORY OF BREAST CANCER: ICD-10-CM

## 2018-09-05 DIAGNOSIS — E78.5 HYPERLIPIDEMIA, UNSPECIFIED HYPERLIPIDEMIA TYPE: ICD-10-CM

## 2018-09-05 DIAGNOSIS — M35.9 CONNECTIVE TISSUE DISORDER (HCC): ICD-10-CM

## 2018-09-05 LAB
ALBUMIN SERPL-MCNC: 3.8 G/DL (ref 3.4–5)
ALBUMIN/GLOB SERPL: 1 {RATIO} (ref 0.8–1.7)
ALP SERPL-CCNC: 65 U/L (ref 45–117)
ALT SERPL-CCNC: 38 U/L (ref 13–56)
ANION GAP SERPL CALC-SCNC: 9 MMOL/L (ref 3–18)
APPEARANCE UR: CLEAR
AST SERPL-CCNC: 18 U/L (ref 15–37)
BASOPHILS # BLD: 0 K/UL (ref 0–0.1)
BASOPHILS NFR BLD: 0 % (ref 0–2)
BILIRUB SERPL-MCNC: 0.3 MG/DL (ref 0.2–1)
BILIRUB UR QL: NEGATIVE
BUN SERPL-MCNC: 25 MG/DL (ref 7–18)
BUN/CREAT SERPL: 23 (ref 12–20)
CALCIUM SERPL-MCNC: 9.1 MG/DL (ref 8.5–10.1)
CHLORIDE SERPL-SCNC: 104 MMOL/L (ref 100–108)
CHOLEST SERPL-MCNC: 160 MG/DL
CO2 SERPL-SCNC: 26 MMOL/L (ref 21–32)
COLOR UR: YELLOW
CREAT SERPL-MCNC: 1.1 MG/DL (ref 0.6–1.3)
DIFFERENTIAL METHOD BLD: ABNORMAL
EOSINOPHIL # BLD: 0.3 K/UL (ref 0–0.4)
EOSINOPHIL NFR BLD: 4 % (ref 0–5)
ERYTHROCYTE [DISTWIDTH] IN BLOOD BY AUTOMATED COUNT: 14.8 % (ref 11.6–14.5)
GLOBULIN SER CALC-MCNC: 4 G/DL (ref 2–4)
GLUCOSE SERPL-MCNC: 180 MG/DL (ref 74–99)
GLUCOSE UR STRIP.AUTO-MCNC: NEGATIVE MG/DL
HBA1C MFR BLD HPLC: 8 %
HCT VFR BLD AUTO: 38.5 % (ref 35–45)
HDLC SERPL-MCNC: 68 MG/DL (ref 40–60)
HDLC SERPL: 2.4 {RATIO} (ref 0–5)
HGB BLD-MCNC: 12.3 G/DL (ref 12–16)
HGB UR QL STRIP: NEGATIVE
KETONES UR QL STRIP.AUTO: NEGATIVE MG/DL
LDLC SERPL CALC-MCNC: 67.8 MG/DL (ref 0–100)
LEUKOCYTE ESTERASE UR QL STRIP.AUTO: NEGATIVE
LIPID PROFILE,FLP: ABNORMAL
LYMPHOCYTES # BLD: 1.6 K/UL (ref 0.9–3.6)
LYMPHOCYTES NFR BLD: 22 % (ref 21–52)
MCH RBC QN AUTO: 25.2 PG (ref 24–34)
MCHC RBC AUTO-ENTMCNC: 31.9 G/DL (ref 31–37)
MCV RBC AUTO: 78.7 FL (ref 74–97)
MONOCYTES # BLD: 0.5 K/UL (ref 0.05–1.2)
MONOCYTES NFR BLD: 8 % (ref 3–10)
NEUTS SEG # BLD: 4.6 K/UL (ref 1.8–8)
NEUTS SEG NFR BLD: 66 % (ref 40–73)
NITRITE UR QL STRIP.AUTO: NEGATIVE
PH UR STRIP: 5.5 [PH] (ref 5–8)
PLATELET # BLD AUTO: 239 K/UL (ref 135–420)
PMV BLD AUTO: 10.1 FL (ref 9.2–11.8)
POTASSIUM SERPL-SCNC: 4.6 MMOL/L (ref 3.5–5.5)
PROT SERPL-MCNC: 7.8 G/DL (ref 6.4–8.2)
PROT UR STRIP-MCNC: NEGATIVE MG/DL
RBC # BLD AUTO: 4.89 M/UL (ref 4.2–5.3)
SODIUM SERPL-SCNC: 139 MMOL/L (ref 136–145)
SP GR UR REFRACTOMETRY: 1.02 (ref 1–1.03)
TRIGL SERPL-MCNC: 121 MG/DL (ref ?–150)
TSH SERPL DL<=0.05 MIU/L-ACNC: 0.84 UIU/ML (ref 0.36–3.74)
UROBILINOGEN UR QL STRIP.AUTO: 0.2 EU/DL (ref 0.2–1)
VLDLC SERPL CALC-MCNC: 24.2 MG/DL
WBC # BLD AUTO: 7 K/UL (ref 4.6–13.2)

## 2018-09-05 PROCEDURE — 85025 COMPLETE CBC W/AUTO DIFF WBC: CPT | Performed by: INTERNAL MEDICINE

## 2018-09-05 PROCEDURE — 36415 COLL VENOUS BLD VENIPUNCTURE: CPT | Performed by: INTERNAL MEDICINE

## 2018-09-05 PROCEDURE — 80053 COMPREHEN METABOLIC PANEL: CPT | Performed by: INTERNAL MEDICINE

## 2018-09-05 PROCEDURE — 81003 URINALYSIS AUTO W/O SCOPE: CPT | Performed by: INTERNAL MEDICINE

## 2018-09-05 PROCEDURE — 80061 LIPID PANEL: CPT | Performed by: INTERNAL MEDICINE

## 2018-09-05 PROCEDURE — 84443 ASSAY THYROID STIM HORMONE: CPT | Performed by: INTERNAL MEDICINE

## 2018-09-05 NOTE — ACP (ADVANCE CARE PLANNING)
Advance Care Planning (ACP) Provider Note - Comprehensive     Date of ACP Conversation: 09/05/18  Persons included in Conversation:  patient  Length of ACP Conversation in minutes:  <16 minutes (Non-Billable)    Authorized Decision Maker (if patient is incapable of making informed decisions): This person is:  Healthcare Agent/Medical Power of  under Advance Directive          General ACP for ALL Patients with Decision Making Capacity:   Importance of advance care planning, including choosing a healthcare agent to communicate patient's healthcare decisions if patient lost the ability to make decisions, such as after a sudden illness or accident  Understanding of the healthcare agent role was assessed and information provided  Exploration of values, goals, and preferences if recovery is not expected, even with continued medical treatment in the event of: Imminent death  Severe, permanent brain injury  \"In these circumstances, what matters most to you? \"  Care focused more on comfort or quality of life. Opportunity offered to explore how cultural, Roman Catholic, spiritual, or personal beliefs would affect decisions for future care     For Serious or Chronic Illness:  Understanding of medical condition    Understanding of CPR, goals and expected outcomes, benefits and burdens discussed. Information on CPR success rates provided (e.g. for CPR in hospital, survival to d/c at two weeks is 22%, for chronically ill or elderly/frail survival is less than 3%); Individual asked to communicate understanding of information in his/her own words.   Explored fears and concerns regarding CPR or possible outcomes    Interventions Provided:  Recommended completion of Advance Directive form after review of ACP materials and conversation with prospective healthcare agent   Recommended communicating the plan and making copies for the healthcare agent, personal physician, and others as appropriate (e.g., health system)  Recommended review of completed ACP document annually or upon change in health status

## 2018-09-05 NOTE — MR AVS SNAPSHOT
[address] [place] [address] [address] [id number] 
[phone] Patient: Aaron Hutchins MRN: [id number] :1950 Visit Information Date & Time Provider Department Dept. Phone Encounter #  
 2018 10:00 AM Leonela Menon, [address] [phone] [id number] Follow-up Instructions Return in about 3 months (around 2018) for dm, htn, hld. Upcoming Health Maintenance Date Due ZOSTER VACCINE AGE 60> 2012 Influenza Age 5 to Adult 2018 Pneumococcal 65+ Low/Medium Risk (2 of 2 - PPSV23) 2018 MICROALBUMIN Q1 2018 LIPID PANEL Q1 2018 MEDICARE YEARLY EXAM 2018 FOOT EXAM Q1 2018 HEMOGLOBIN A1C Q6M 2018 EYE EXAM RETINAL OR DILATED Q1 6/15/2019 BREAST CANCER SCRN MAMMOGRAM 2020 DTaP/Tdap/Td series (2 - Td) 2020 GLAUCOMA SCREENING Q2Y 6/15/2020 COLONOSCOPY 2021 Allergies as of 2018  Review Complete On: 2018 By: Leonela Menon MD  
  
 Severity Noted Reaction Type Reactions Latex  2017    Hives Atorvastatin  2018    Nausea Only Penicillins  2010    Hives Current Immunizations  Reviewed on 2017 Name Date Hepatitis A Vaccine 10/20/2006, 2006 Influenza High Dose Vaccine PF 2017 Influenza Vaccine Whole 10/5/2009 Pneumococcal Conjugate (PCV-13) 2017 TD Vaccine 2006 TDAP Vaccine 2010 ZZZ-RETIRED (DO NOT USE) Pneumococcal Vaccine (Unspecified Type) 2004 Not reviewed this visit You Were Diagnosed With   
  
 Codes Comments Type 2 diabetes mellitus without complication, without long-term current use of insulin (HCC)    -  Primary ICD-10-CM: E11.9 ICD-9-CM: 250.00 Hyperlipidemia, unspecified hyperlipidemia type     ICD-10-CM: E78.5 ICD-9-CM: 272.4 Essential hypertension     ICD-10-CM: I10 
ICD-9-CM: 401.9 Connective tissue disorder ([facility]     ICD-10-CM: M35.9 ICD-9-CM: 710.9 Irritable bowel syndrome with diarrhea     ICD-10-CM: K58.0 ICD-9-CM: 002.0 History of breast cancer     ICD-10-CM: Z85.3 ICD-9-CM: V10.3 Vitals BP Pulse Temp Resp Height(growth percentile) Weight(growth percentile) 123/66 87 97.2 °F (36.2 °C) (Oral) 20 5' 2\" (1.575 m) 211 lb 12.8 oz (96.1 kg) SpO2 BMI OB Status Smoking Status 97% 38.74 kg/m2 Menopause Never Smoker BMI and BSA Data Body Mass Index Body Surface Area 38.74 kg/m 2 2.05 m 2 Preferred Pharmacy Pharmacy Name Phone [facility] [address] [id number]. [phone] Your Updated Medication List  
  
   
This list is accurate as of 9/5/18 10:22 AM.  Always use your most recent med list.  
  
  
  
  
 aspirin delayed-release 81 mg tablet Take  by mouth daily. Blood Pressure Monitor Kit Commonly known as:  BLOOD PRESSURE KIT Use twice daily  
  
 carvedilol 6.25 mg tablet Commonly known as:  Harjit Gut Take 6.25 mg by mouth two (2) times a day. glipiZIDE 10 mg tablet Commonly known as:  Korene Cochran Take 10 mg by mouth two (2) times a day. hydroCHLOROthiazide 12.5 mg tablet Commonly known as:  HYDRODIURIL Take 12.5 mg by mouth daily. ibuprofen 600 mg tablet Commonly known as:  MOTRIN Take 1 Tab by mouth every six (6) hours as needed. JANUMET -1,000 mg Tm24 Generic drug:  SITagliptin-metFORMIN Take 1 Tab by mouth daily. PLAQUENIL 200 mg tablet Generic drug:  hydroxychloroquine Take 200 mg by mouth daily. ramipril 10 mg capsule Commonly known as:  ALTACE Take 1 Cap by mouth daily. WELCHOL 625 mg tablet Generic drug:  colesevelam  
Take 1,875 mg by mouth two (2) times daily (with meals). We Performed the Following AMB POC HEMOGLOBIN A1C [03151 CPT(R)] Follow-up Instructions Return in about 3 months (around 12/5/2018) for dm, htn, hld. To-Do List   
 01/30/2019 7:00 AM  
  Appointment with [facility] LILI Calixto 9 RM 1 at [facility] ([phone]) PAYMENT  For Non-Medicare patients - $15.00 will be collected from you at the time of your exam.  You will be billed $35.00 from the reading [facility]. OUTSIDE FILMS  - Any outside films related to the study being scheduled should be brought with you on the day of the exam.  If this cannot be done there may be a delay in the reading of the study. MEDICATIONS  - Patient must bring a complete list of all medications currently taking to include prescriptions, over-the-counter meds, herbals, vitamins & any dietary supplements  GENERAL INSTRUCTIONS  - On the day of your exam do not use any bath powder, deodorant or lotions on the armpit area. -Tenderness of breasts may cause an increase of discomfort during procedure. If you are experiencing breast tenderness on the day of your appointment and would like to reschedule, please call [phone]. Introducing [facility]! Dear Bud Nuno: Thank you for requesting a [facility]idence account. Our records indicate that you already have an active ividence account. You can access your account anytime at [email or web address] Did you know that you can access your hospital and ER discharge instructions at any time in ividence? You can also review all of your test results from your hospital stay or ER visit. Additional Information If you have questions, please visit the Frequently Asked Questions section of the ividence website at [email or web address] Remember, ividence is NOT to be used for urgent needs. For medical emergencies, dial 911. Now available from your iPhone and Android! Please provide this summary of care documentation to your next provider. Your primary care clinician is listed as Gonzales Mcneill. If you have any questions after today's visit, please call [phone].

## 2018-09-05 NOTE — PROGRESS NOTES
1. Have you been to the ER, urgent care clinic since your last visit? Hospitalized since your last visit? No    2. Have you seen or consulted any other health care providers outside of the [address] [place] [address] since your last visit? Include any pap smears or colon screening.  No

## 2018-09-05 NOTE — PROGRESS NOTES
History of Present Illness  Sylvie Foley is a 74 y.o. female who presents today for management of    Chief Complaint   Patient presents with    Diabetes    Hypertension    Cholesterol Problem       Diabetes Mellitus:  She has diabetes mellitus, and  hypertension and hyperlipidemia. Diabetic ROS - medication compliance: compliant all of the time, diabetic diet compliance: compliant all of the time, home glucose monitoring: fasting values range 100-120s, further diabetic ROS: no polyuria or polydipsia, no chest pain, dyspnea or TIA's, no numbness, tingling or pain in extremities, last eye exam approximately few months ago. Lab review: orders written for new lab studies as appropriate; see orders. Cardiovascular Review:  The patient has diabetes, hypertension, hyperlipidemia and obesity. Diet and Lifestyle: generally follows a low fat low cholesterol diet, generally follows a low sodium diet, exercises regularly, nonsmoker  Home BP Monitoring: is not measured at home. Pertinent ROS: taking medications as instructed, no medication side effects noted, no TIA's, no chest pain on exertion, no dyspnea on exertion, no swelling of ankles.        Connective Tissue Disorder and Chronic Pain:  Patient has arthralgias, primarily affecting the hands and ankles. Symptoms onset: problem is longstanding. Rheumatological ROS: no current joint or muscle symptoms, essentially pain-free. Response to treatment plan: well controlled.      Problem List  Patient Active Problem List    Diagnosis Date Noted    Severe obesity (BMI 35.0-39.9) ([facility] 06/05/2018    Hyperlipidemia 06/05/2018    Connective tissue disorder ([facility] 01/12/2018    ACP (advance care planning) 11/20/2017    History of breast cancer 11/20/2017    IBS (irritable bowel syndrome)     Diabetes mellitus, type 2 ([facility]     HTN (hypertension) 04/05/2010       Past Medical History  Past Medical History:   Diagnosis Date    Angina pectoris     w/ silent ischemia    [facility]     Cancer ([facility] 1987    breast    Chronic insomnia     Diabetes ([facility]     Diabetes mellitus type II 2003    Exogenous obesity     HTN (hypertension) 4/5/2010    Hypertension     IBS (irritable bowel syndrome)     SLE (systemic lupus erythematosus) ([facility]         Surgical History  Past Surgical History:   Procedure Laterality Date    EGD  02/20/09    HX BREAST BIOPSY  2003    right breast neg.  HX BREAST RECONSTRUCTION  1988    left breast    HX BREAST RECONSTRUCTION Left     HX BREAST REDUCTION Right 1987    HX CHOLECYSTECTOMY  1990    HX CHOLECYSTECTOMY      HX HEART CATHETERIZATION  11/28/03    left, normal    HX MASTECTOMY  1987    left breast    HX MASTECTOMY Left     [place] COLONOSCOPY FLX DX W/COLLJ SPEC WHEN PFRMD  2005 3/09    abby --egd capsule colon (NEG)        Current Medications  Current Outpatient Prescriptions   Medication Sig    ramipril (ALTACE) 10 mg capsule Take 1 Cap by mouth daily.  carvedilol (COREG) 6.25 mg tablet Take 6.25 mg by mouth two (2) times a day.  Blood Pressure Monitor (BLOOD PRESSURE KIT) kit Use twice daily    JANUMET -1,000 mg TM24 Take 1 Tab by mouth daily.  ibuprofen (MOTRIN) 600 mg tablet Take 1 Tab by mouth every six (6) hours as needed.  hydroxychloroquine (PLAQUENIL) 200 mg tablet Take 200 mg by mouth daily.  hydroCHLOROthiazide (HYDRODIURIL) 12.5 mg tablet Take 12.5 mg by mouth daily.  aspirin delayed-release 81 mg tablet Take  by mouth daily.  glipiZIDE (GLUCOTROL) 10 mg tablet Take 10 mg by mouth two (2) times a day.  colesevelam (WELCHOL) 625 mg tablet Take 1,875 mg by mouth two (2) times daily (with meals). No current facility-administered medications for this visit.         Allergies/Drug Reactions  Allergies   Allergen Reactions    Latex Hives    Atorvastatin Nausea Only    Penicillins Hives        Family History  Family History   Problem Relation Age of Onset    Diabetes Mother     Heart Disease Father     Heart Attack Father     Breast Cancer Paternal Aunt     Diabetes Brother     Asthma Brother         Social History  Social History     Social History    Marital status:      Spouse name: N/A    Number of children: N/A    Years of education: N/A     Occupational History    Not on file.      Social History Main Topics    Smoking status: Never Smoker    Smokeless tobacco: Never Used    Alcohol use No    Drug use: No    Sexual activity: Yes     Partners: Male     Other Topics Concern    Not on file     Social History Narrative    ** Merged History Encounter **            Review of Systems  General ROS: negative for - chills, fatigue or fever  Psychological ROS: negative for - anxiety or depression  Ophthalmic ROS: positive for - floaters  Respiratory ROS: no cough, shortness of breath, or wheezing  Cardiovascular ROS: no chest pain or dyspnea on exertion  Gastrointestinal ROS: no abdominal pain, change in bowel habits, or black or bloody stools  Genito-Urinary ROS: no dysuria, trouble voiding, or hematuria  Musculoskeletal ROS: negative  Neurological ROS: no TIA or stroke symptoms      Physical Exam  Vital signs:   Vitals:    09/05/18 1006   BP: 123/66   Pulse: 87   Resp: 20   Temp: 97.2 °F (36.2 °C)   TempSrc: Oral   SpO2: 97%   Weight: 211 lb 12.8 oz (96.1 kg)   Height: 5' 2\" (1.575 m)       General: alert, oriented, not in distress  Eyes: clear conjunctivae, anicteric sclerae, full and equal ROMs  Chest/Lungs: clear breath sounds, no wheezing or crackles  Heart: normal rate, regular rhythm, no murmur  Abdomen: soft, non-distended, non-tender, normal bowel sounds, no organomegaly, no masses  Extremities: no focal deformities, no edema  Neuro: AAOx3, CN's grossly intact  Skin: no visible abnormalities    Laboratory/Tests:  Component      Latest Ref Rng & Units 11/20/2017 11/20/2017 11/20/2017 11/20/2017      11:46 AM 11:45 AM 11:45 AM 11:45 AM   WBC      4.6 - 13.2 K/uL       6.6   RBC      4.20 - 5.30 M/uL       4.90   HGB      12.0 - 16.0 g/dL       12.3   HCT      35.0 - 45.0 %       38.9   MCV      74.0 - 97.0 FL       79.4   MCH      24.0 - 34.0 PG       25.1   MCHC      31.0 - 37.0 g/dL       31.6   RDW      11.6 - 14.5 %       14.7 (H)   PLATELET      972 - 636 K/uL       321   MPV      9.2 - 11.8 FL       9.6   NEUTROPHILS      40 - 73 %       64   LYMPHOCYTES      21 - 52 %       27   MONOCYTES      3 - 10 %       6   EOSINOPHILS      0 - 5 %       2   BASOPHILS      0 - 2 %       1   ABS. NEUTROPHILS      1.8 - 8.0 K/UL       4.2   ABS. LYMPHOCYTES      0.9 - 3.6 K/UL       1.8   ABS. MONOCYTES      0.05 - 1.2 K/UL       0.4   ABS. EOSINOPHILS      0.0 - 0.4 K/UL       0.2   ABS. BASOPHILS      0.0 - 0.06 K/UL       0.0   DF             AUTOMATED   Sodium      136 - 145 mmol/L   141       Potassium      3.5 - 5.5 mmol/L   4.3       Chloride      100 - 108 mmol/L   107       CO2      21 - 32 mmol/L   29       Anion gap      3.0 - 18 mmol/L   5       Glucose      74 - 99 mg/dL   114 (H)       BUN      7.0 - 18 MG/DL   21 (H)       Creatinine      0.6 - 1.3 MG/DL   1.02       BUN/Creatinine ratio      12 - 20     21 (H)       GFR est AA      >60 ml/min/1.73m2   >60       GFR est non-AA      >60 ml/min/1.73m2   54 (L)       Calcium      8.5 - 10.1 MG/DL   9.3       Bilirubin, total      0.2 - 1.0 MG/DL   0.4       ALT (SGPT)      13 - 56 U/L   27       AST      15 - 37 U/L   14 (L)       Alk. phosphatase      45 - 117 U/L   64       Protein, total      6.4 - 8.2 g/dL   8.1       Albumin      3.4 - 5.0 g/dL   3.6       Globulin      2.0 - 4.0 g/dL   4.5 (H)       A-G Ratio      0.8 - 1.7     0.8       Cholesterol, total      <200 MG/DL     160     Triglyceride      <150 MG/DL     112     HDL Cholesterol      40 - 60 MG/DL     76 (H)     LDL, calculated      0 - 100 MG/DL     61.6     VLDL, calculated      MG/DL     22. 4     CHOL/HDL Ratio      0 - 5.0       2.1   Microalbumin,urine random      0 - 3.0 MG/DL 0.40         Creatinine, urine      30 - 125 mg/dL 100.47         Microalbumin/Creat. Ratio      0 - 30 mg/g <4               Assessment/Plan:    1. Type 2 diabetes mellitus without complication, without long-term current use of insulin (HCC)  - fairly controlled  - continue current medications  - AMB POC HEMOGLOBIN A1C 8%     2. Essential hypertension  - controlled  - continue current medications  - low salt diet      3. Connective tissue disorder [facility])  - rheumatology follow-up      4. Irritable bowel syndrome with diarrhea  - controlled  - GI follow-up as scheduled      5. Class 2 obesity due to excess calories with serious comorbidity and body mass index (BMI) of 38.0 to 38.9 in adult  - The patient is asked to make an attempt to improve diet and exercise patterns to aid in medical management of this problem.     6. Hyperlipidemia  - stopped atorvastatin due to nausea  - continue Welchol   - last lipid panel within normal limits     7. History of breast cancer, left  - s/p mastectomy  - stable      Follow-up Disposition:  Return in about 3 months (around 12/5/2018) for dm, htn, hld. I have discussed the diagnosis with the patient and the intended plan as seen in the above orders. The patient has received an after-visit summary and questions were answered concerning future plans. I have discussed medication side effects and warnings with the patient as well. I have reviewed the plan of care with the patient, accepted their input and they are in agreement with the treatment goals.        Stacia Blakely MD  September 5, 2018

## 2018-09-06 ENCOUNTER — TELEPHONE (OUTPATIENT)
Dept: FAMILY MEDICINE CLINIC | Age: 68
End: 2018-09-06

## 2018-09-06 DIAGNOSIS — E11.9 TYPE 2 DIABETES MELLITUS WITHOUT COMPLICATION, WITHOUT LONG-TERM CURRENT USE OF INSULIN (HCC): Primary | ICD-10-CM

## 2018-09-06 RX ORDER — METFORMIN HYDROCHLORIDE 1000 MG/1
1000 TABLET ORAL 2 TIMES DAILY WITH MEALS
Qty: 180 TAB | Refills: 1 | Status: SHIPPED | OUTPATIENT
Start: 2018-09-06 | End: 2018-12-05 | Stop reason: SINTOL

## 2018-09-06 NOTE — TELEPHONE ENCOUNTER
Did she change insurance? She has been on Janumet for a year. Anyway, let's try metformin 1000mg and januvia 100mg in separate tablets.

## 2018-09-06 NOTE — TELEPHONE ENCOUNTER
Pt. Stated that medication Janumet cost $1500, she is requesting an alternative medication.  Please assist.

## 2018-12-05 ENCOUNTER — OFFICE VISIT (OUTPATIENT)
Dept: FAMILY MEDICINE CLINIC | Age: 68
End: 2018-12-05

## 2018-12-05 ENCOUNTER — HOSPITAL ENCOUNTER (OUTPATIENT)
Dept: LAB | Age: 68
Discharge: HOME | End: 2018-12-05
Payer: MEDICARE

## 2018-12-05 VITALS
HEIGHT: 62 IN | SYSTOLIC BLOOD PRESSURE: 123 MMHG | RESPIRATION RATE: 20 BRPM | BODY MASS INDEX: 38.87 KG/M2 | WEIGHT: 211.2 LBS | TEMPERATURE: 98.2 F | OXYGEN SATURATION: 99 % | HEART RATE: 86 BPM | DIASTOLIC BLOOD PRESSURE: 78 MMHG

## 2018-12-05 DIAGNOSIS — I10 ESSENTIAL HYPERTENSION: ICD-10-CM

## 2018-12-05 DIAGNOSIS — Z23 ENCOUNTER FOR IMMUNIZATION: ICD-10-CM

## 2018-12-05 DIAGNOSIS — E78.5 HYPERLIPIDEMIA, UNSPECIFIED HYPERLIPIDEMIA TYPE: ICD-10-CM

## 2018-12-05 DIAGNOSIS — E11.9 TYPE 2 DIABETES MELLITUS WITHOUT COMPLICATION, WITHOUT LONG-TERM CURRENT USE OF INSULIN (HCC): ICD-10-CM

## 2018-12-05 DIAGNOSIS — E11.9 TYPE 2 DIABETES MELLITUS WITHOUT COMPLICATION, WITHOUT LONG-TERM CURRENT USE OF INSULIN (HCC): Primary | ICD-10-CM

## 2018-12-05 LAB
CREAT UR-MCNC: 78 MG/DL (ref 30–125)
HBA1C MFR BLD HPLC: 7.9 %
MICROALBUMIN UR-MCNC: 0.63 MG/DL (ref 0–3)
MICROALBUMIN/CREAT UR-RTO: 8 MG/G (ref 0–30)

## 2018-12-05 PROCEDURE — 82570 ASSAY OF URINE CREATININE: CPT

## 2018-12-05 NOTE — PROGRESS NOTES
History of Present Illness  Zach Tucker is a 79 y.o. female who presents today for management of    Chief Complaint   Patient presents with    Diabetes    Hypertension    Cholesterol Problem    Chronic Kidney Disease       Diabetes Mellitus:  She has diabetes mellitus, and  hypertension and hyperlipidemia. Diabetic ROS - medication compliance: compliant all of the time, diabetic diet compliance: compliant all of the time, home glucose monitoring: values are usually normal.   Lab review: orders written for new lab studies as appropriate; see orders. She complains of diarrhea with metformin. Cardiovascular Review:  The patient has hypertension and hyperlipidemia. Diet and Lifestyle: generally follows a low fat low cholesterol diet, generally follows a low sodium diet, sedentary, nonsmoker  Home BP Monitoring: is not measured at home. Pertinent ROS: taking medications as instructed, no medication side effects noted, no TIA's, no chest pain on exertion, no dyspnea on exertion, no swelling of ankles.      Problem List  Patient Active Problem List    Diagnosis Date Noted    Severe obesity (BMI 35.0-39.9) 06/05/2018    Hyperlipidemia 06/05/2018    Connective tissue disorder ([facility] 01/12/2018    ACP (advance care planning) 11/20/2017    History of breast cancer 11/20/2017    IBS (irritable bowel syndrome)     Diabetes mellitus, type 2 ([address]     HTN (hypertension) 04/05/2010       Past Medical History  Past Medical History:   Diagnosis Date    Angina pectoris     w/ silent ischemia    Breast cancer ([facility]     Cancer ([facility] 1987    breast    Chronic insomnia     Diabetes ([facility]     Diabetes mellitus type II 2003    Exogenous obesity     HTN (hypertension) 4/5/2010    Hypertension     IBS (irritable bowel syndrome)     SLE (systemic lupus erythematosus) ([facility]         Surgical History  Past Surgical History:   Procedure Laterality Date    EGD  02/20/09    HX BREAST BIOPSY  2003    right breast neg.    HX BREAST RECONSTRUCTION  1988    left breast    HX BREAST RECONSTRUCTION Left     HX BREAST REDUCTION Right 1987    HX CHOLECYSTECTOMY  1990    HX CHOLECYSTECTOMY      HX HEART CATHETERIZATION  11/28/03    left, normal    HX MASTECTOMY  1987    left breast    HX MASTECTOMY Left     [place] COLONOSCOPY FLX DX W/COLLJ SPEC WHEN PFRMD  2005 3/09    abby --egd capsule colon (NEG)        Current Medications  Current Outpatient Medications   Medication Sig    exenatide microspheres (BYDUREON BCISE) 2 mg/0.85 mL atIn 2 mg by SubCUTAneous route every seven (7) days.  SITagliptin (JANUVIA) 50 mg tablet Take 1 Tab by mouth daily.  ramipril (ALTACE) 10 mg capsule Take 1 Cap by mouth daily.  carvedilol (COREG) 6.25 mg tablet Take 6.25 mg by mouth two (2) times a day.  Blood Pressure Monitor (BLOOD PRESSURE KIT) kit Use twice daily    ibuprofen (MOTRIN) 600 mg tablet Take 1 Tab by mouth every six (6) hours as needed.  hydroxychloroquine (PLAQUENIL) 200 mg tablet Take 200 mg by mouth daily.  hydroCHLOROthiazide (HYDRODIURIL) 12.5 mg tablet Take 12.5 mg by mouth daily.  aspirin delayed-release 81 mg tablet Take  by mouth daily.  glipiZIDE (GLUCOTROL) 10 mg tablet Take 10 mg by mouth two (2) times a day.  colesevelam (WELCHOL) 625 mg tablet Take 1,875 mg by mouth two (2) times daily (with meals). No current facility-administered medications for this visit.         Allergies/Drug Reactions  Allergies   Allergen Reactions    Latex Hives    Atorvastatin Nausea Only    Metformin Diarrhea    Penicillins Hives        Family History  Family History   Problem Relation Age of Onset    Diabetes Mother     Heart Disease Father     Heart Attack Father     Breast Cancer Paternal Aunt     Diabetes Brother     Asthma Brother         Social History  Social History     Socioeconomic History    Marital status:      Spouse name: Not on file    Number of children: Not on file    Years of education: Not on file    Highest education level: Not on file   Social Needs    Financial resource strain: Not on file    Food insecurity - worry: Not on file    Food insecurity - inability: Not on file    Transportation needs - medical: Not on file   [employer] needs - non-medical: Not on file   Occupational History    Not on file   Tobacco Use    Smoking status: Never Smoker    Smokeless tobacco: Never Used   Substance and Sexual Activity    Alcohol use: No    Drug use: No    Sexual activity: Yes     Partners: Male   Other Topics Concern    Not on file   Social History Narrative    ** Merged History Encounter **            Review of Systems  Negative except as mentioned in HPI      Physical Exam  Vital signs:   Vitals:    12/05/18 0916   BP: 123/78   Pulse: 86   Resp: 20   Temp: 98.2 °F (36.8 °C)   TempSrc: Oral   SpO2: 99%   Weight: 211 lb 3.2 oz (95.8 kg)   Height: 5' 2\" (1.575 m)       General: alert, oriented, not in distress  Eyes: clear conjunctivae, anicteric sclerae, full and equal ROMs  Chest/Lungs: clear breath sounds, no wheezing or crackles  Heart: normal rate, regular rhythm, no murmur  Abdomen: soft, non-distended, non-tender, normal bowel sounds, no organomegaly, no masses  Extremities: no focal deformities, no edema  Neuro: AAOx3, CN's grossly intact  Skin: no visible abnormalities  Foot exam: no open cuts, ulcers or wounds. DP full and equal. Sensation intact with 5.07 g monofilament wire bilaterally.       Laboratory/Tests:    Component      Latest Ref Rng & Units 9/5/2018 9/5/2018 9/5/2018 9/5/2018          10:30 AM 10:30 AM 10:30 AM 10:30 AM   WBC      4.6 - 13.2 K/uL       RBC      4.20 - 5.30 M/uL       HGB      12.0 - 16.0 g/dL       HCT      35.0 - 45.0 %       MCV      74.0 - 97.0 FL       MCH      24.0 - 34.0 PG       MCHC      31.0 - 37.0 g/dL       RDW      11.6 - 14.5 %       PLATELET      905 - 111 K/uL       MPV      9.2 - 11.8 FL       NEUTROPHILS      40 - 73 %       LYMPHOCYTES      21 - 52 %       MONOCYTES      3 - 10 %       EOSINOPHILS      0 - 5 %       BASOPHILS      0 - 2 %       ABS. NEUTROPHILS      1.8 - 8.0 K/UL       ABS. LYMPHOCYTES      0.9 - 3.6 K/UL       ABS. MONOCYTES      0.05 - 1.2 K/UL       ABS. EOSINOPHILS      0.0 - 0.4 K/UL       ABS. BASOPHILS      0.0 - 0.1 K/UL       DF             Sodium      136 - 145 mmol/L  139     Potassium      3.5 - 5.5 mmol/L  4.6     Chloride      100 - 108 mmol/L  104     CO2      21 - 32 mmol/L  26     Anion gap      3.0 - 18 mmol/L  9     Glucose      74 - 99 mg/dL  180 (H)     BUN      7.0 - 18 MG/DL  25 (H)     Creatinine      0.6 - 1.3 MG/DL  1.10     BUN/Creatinine ratio      12 - 20    23 (H)     GFR est AA      >60 ml/min/1.73m2  >60     GFR est non-AA      >60 ml/min/1.73m2  50 (L)     Calcium      8.5 - 10.1 MG/DL  9.1     Bilirubin, total      0.2 - 1.0 MG/DL  0.3     ALT (SGPT)      13 - 56 U/L  38     AST      15 - 37 U/L  18     Alk.  phosphatase      45 - 117 U/L  65     Protein, total      6.4 - 8.2 g/dL  7.8     Albumin      3.4 - 5.0 g/dL  3.8     Globulin      2.0 - 4.0 g/dL  4.0     A-G Ratio      0.8 - 1.7    1.0     Color          YELLOW   Appearance          CLEAR   Specific gravity      1.005 - 1.030      1.018   pH (UA)      5.0 - 8.0      5.5   Protein      NEG mg/dL    NEGATIVE   Glucose      NEG mg/dL    NEGATIVE   Ketone      NEG mg/dL    NEGATIVE   Bilirubin      NEG      NEGATIVE   Blood      NEG      NEGATIVE   Urobilinogen      0.2 - 1.0 EU/dL    0.2   Nitrites      NEG      NEGATIVE   Leukocyte Esterase      NEG      NEGATIVE   Cholesterol, total      <200 MG/DL   160    Triglyceride      <150 MG/DL   121    HDL Cholesterol      40 - 60 MG/DL   68 (H)    LDL, calculated      0 - 100 MG/DL   67.8    VLDL, calculated      MG/DL   24.2    CHOL/HDL Ratio      0 - 5.0     2.4    TSH      0.36 - 3.74 uIU/mL 0.84        Component      Latest Ref Rng & Units 9/5/2018          10:30 AM WBC      4.6 - 13.2 K/uL 7.0   RBC      4.20 - 5.30 M/uL 4.89   HGB      12.0 - 16.0 g/dL 12.3   HCT      35.0 - 45.0 % 38.5   MCV      74.0 - 97.0 FL 78.7   MCH      24.0 - 34.0 PG 25.2   MCHC      31.0 - 37.0 g/dL 31.9   RDW      11.6 - 14.5 % 14.8 (H)   PLATELET      810 - 742 K/uL 239   MPV      9.2 - 11.8 FL 10.1   NEUTROPHILS      40 - 73 % 66   LYMPHOCYTES      21 - 52 % 22   MONOCYTES      3 - 10 % 8   EOSINOPHILS      0 - 5 % 4   BASOPHILS      0 - 2 % 0   ABS. NEUTROPHILS      1.8 - 8.0 K/UL 4.6   ABS. LYMPHOCYTES      0.9 - 3.6 K/UL 1.6   ABS. MONOCYTES      0.05 - 1.2 K/UL 0.5   ABS. EOSINOPHILS      0.0 - 0.4 K/UL 0.3   ABS. BASOPHILS      0.0 - 0.1 K/UL 0.0   DF       AUTOMATED   Sodium      136 - 145 mmol/L    Potassium      3.5 - 5.5 mmol/L    Chloride      100 - 108 mmol/L    CO2      21 - 32 mmol/L    Anion gap      3.0 - 18 mmol/L    Glucose      74 - 99 mg/dL    BUN      7.0 - 18 MG/DL    Creatinine      0.6 - 1.3 MG/DL    BUN/Creatinine ratio      12 - 20      GFR est AA      >60 ml/min/1.73m2    GFR est non-AA      >60 ml/min/1.73m2    Calcium      8.5 - 10.1 MG/DL    Bilirubin, total      0.2 - 1.0 MG/DL    ALT (SGPT)      13 - 56 U/L    AST      15 - 37 U/L    Alk.  phosphatase      45 - 117 U/L    Protein, total      6.4 - 8.2 g/dL    Albumin      3.4 - 5.0 g/dL    Globulin      2.0 - 4.0 g/dL    A-G Ratio      0.8 - 1.7      Color          Appearance          Specific gravity      1.005 - 1.030      pH (UA)      5.0 - 8.0      Protein      NEG mg/dL    Glucose      NEG mg/dL    Ketone      NEG mg/dL    Bilirubin      NEG      Blood      NEG      Urobilinogen      0.2 - 1.0 EU/dL    Nitrites      NEG      Leukocyte Esterase      NEG      Cholesterol, total      <200 MG/DL    Triglyceride      <150 MG/DL    HDL Cholesterol      40 - 60 MG/DL    LDL, calculated      0 - 100 MG/DL    VLDL, calculated      MG/DL    CHOL/HDL Ratio      0 - 5.0      TSH      0.36 - 3.74 uIU/mL      Laboratory results reviewed and discussed with patient. Assessment/Plan:      1. Type 2 diabetes mellitus without complication, without long-term current use of insulin (HCC)  - improved  - AMB POC HEMOGLOBIN A1C - 7.9%  -  DIABETES FOOT EXAM  - MICROALBUMIN, UR, RAND W/ MICROALB/CREAT RATIO; Future  - start Bydureon  - restart Januvia 50mg daily  - continue glipizide  - stop metformin due to side effects    2. Hyperlipidemia, unspecified hyperlipidemia type  - controlled    3. Essential hypertension  - controlled    4. Encounter for immunization  - INFLUENZA VIRUS VAC QUAD,SPLIT,PRESV FREE SYRINGE IM  - CA IMMUNIZ ADMIN,1 SINGLE/COMB VAC/TOXOID  - PNEUMOCOCCAL POLYSACCHARIDE VACCINE, 23-VALENT, ADULT OR IMMUNOSUPPRESSED PT DOSE,      Follow-up Disposition:  Return in about 3 months (around 3/5/2019) for rov. I have discussed the diagnosis with the patient and the intended plan as seen in the above orders. The patient has received an after-visit summary and questions were answered concerning future plans. I have discussed medication side effects and warnings with the patient as well. I have reviewed the plan of care with the patient, accepted their input and they are in agreement with the treatment goals.        Jenny Turner MD  December 5, 2018

## 2018-12-05 NOTE — PROGRESS NOTES
1. Have you been to the ER, urgent care clinic since your last visit? Hospitalized since your last visit? No    2. Have you seen or consulted any other health care providers outside of the [address] [place] [address] since your last visit? Include any pap smears or colon screening.  No

## 2018-12-07 ENCOUNTER — TELEPHONE (OUTPATIENT)
Dept: FAMILY MEDICINE CLINIC | Age: 68
End: 2018-12-07

## 2018-12-07 DIAGNOSIS — E11.9 TYPE 2 DIABETES MELLITUS WITHOUT COMPLICATION, WITHOUT LONG-TERM CURRENT USE OF INSULIN (HCC): Primary | ICD-10-CM

## 2018-12-07 RX ORDER — METFORMIN HYDROCHLORIDE 500 MG/1
500 TABLET ORAL 2 TIMES DAILY WITH MEALS
Qty: 180 TAB | Refills: 1 | Status: SHIPPED | OUTPATIENT
Start: 2018-12-07 | End: 2019-05-31 | Stop reason: SDUPTHER

## 2018-12-07 NOTE — TELEPHONE ENCOUNTER
Patient called and stated Misti Ceron is around $135, she was wondering if she can go back to metformin which is $3. Patient stated she will deal with the side effects she just cannot afford januvia. Please advise, thank you.

## 2019-01-14 ENCOUNTER — TELEPHONE (OUTPATIENT)
Dept: FAMILY MEDICINE CLINIC | Age: 69
End: 2019-01-14

## 2019-01-30 ENCOUNTER — HOSPITAL ENCOUNTER (OUTPATIENT)
Dept: MAMMOGRAPHY | Age: 69
Discharge: HOME | End: 2019-01-30
Attending: INTERNAL MEDICINE
Payer: MEDICARE

## 2019-01-30 DIAGNOSIS — Z12.31 ENCOUNTER FOR SCREENING MAMMOGRAM FOR BREAST CANCER: ICD-10-CM

## 2019-01-30 PROCEDURE — 77063 BREAST TOMOSYNTHESIS BI: CPT

## 2019-02-06 DIAGNOSIS — E11.9 TYPE 2 DIABETES MELLITUS WITHOUT COMPLICATION, WITHOUT LONG-TERM CURRENT USE OF INSULIN (HCC): Primary | ICD-10-CM

## 2019-02-06 RX ORDER — GLIPIZIDE 10 MG/1
10 TABLET ORAL 2 TIMES DAILY
Qty: 180 TAB | Refills: 1 | Status: SHIPPED | OUTPATIENT
Start: 2019-02-06 | End: 2019-03-05 | Stop reason: SDUPTHER

## 2019-02-07 ENCOUNTER — TELEPHONE (OUTPATIENT)
Dept: FAMILY MEDICINE CLINIC | Age: 69
End: 2019-02-07

## 2019-02-07 NOTE — TELEPHONE ENCOUNTER
She can take this regular glipizide 10mg BID. It is okay as long as it is the same dose she used to take.

## 2019-02-07 NOTE — TELEPHONE ENCOUNTER
Patient called in ref to medication sent to the pharm yesterday Glipizide and stated that it is not extended release that she normally takesand she just wants to be sure to have the correct medication.  Please assist.

## 2019-02-08 NOTE — TELEPHONE ENCOUNTER
Voicemail left, informed patient it was okay to take new medication as long as the dose was the same. Informed patient to call with any further questions.

## 2019-02-25 ENCOUNTER — DOCUMENTATION ONLY (OUTPATIENT)
Dept: FAMILY MEDICINE CLINIC | Age: 69
End: 2019-02-25

## 2019-02-26 DIAGNOSIS — E11.9 TYPE 2 DIABETES MELLITUS WITHOUT COMPLICATION, WITHOUT LONG-TERM CURRENT USE OF INSULIN (HCC): ICD-10-CM

## 2019-02-26 RX ORDER — EXENATIDE 2 MG/.65ML
INJECTION, SUSPENSION, EXTENDED RELEASE SUBCUTANEOUS
Qty: 4 ADJUSTABLE DOSE PRE-FILLED PEN SYRINGE | Refills: 2 | Status: SHIPPED | OUTPATIENT
Start: 2019-02-26 | End: 2019-05-24 | Stop reason: SDUPTHER

## 2019-03-05 ENCOUNTER — OFFICE VISIT (OUTPATIENT)
Dept: FAMILY MEDICINE CLINIC | Age: 69
End: 2019-03-05

## 2019-03-05 VITALS
TEMPERATURE: 97.2 F | RESPIRATION RATE: 20 BRPM | DIASTOLIC BLOOD PRESSURE: 71 MMHG | BODY MASS INDEX: 38.28 KG/M2 | HEART RATE: 86 BPM | HEIGHT: 62 IN | OXYGEN SATURATION: 97 % | WEIGHT: 208 LBS | SYSTOLIC BLOOD PRESSURE: 139 MMHG

## 2019-03-05 DIAGNOSIS — E11.9 TYPE 2 DIABETES MELLITUS WITHOUT COMPLICATION, WITHOUT LONG-TERM CURRENT USE OF INSULIN (HCC): Primary | ICD-10-CM

## 2019-03-05 DIAGNOSIS — I10 ESSENTIAL HYPERTENSION: ICD-10-CM

## 2019-03-05 DIAGNOSIS — Z00.00 INITIAL MEDICARE ANNUAL WELLNESS VISIT: ICD-10-CM

## 2019-03-05 DIAGNOSIS — E78.5 HYPERLIPIDEMIA, UNSPECIFIED HYPERLIPIDEMIA TYPE: ICD-10-CM

## 2019-03-05 DIAGNOSIS — Z13.39 SCREENING FOR ALCOHOLISM: ICD-10-CM

## 2019-03-05 DIAGNOSIS — Z71.89 ACP (ADVANCE CARE PLANNING): ICD-10-CM

## 2019-03-05 DIAGNOSIS — M35.9 CONNECTIVE TISSUE DISORDER (HCC): ICD-10-CM

## 2019-03-05 LAB — HBA1C MFR BLD HPLC: 8.2 %

## 2019-03-05 RX ORDER — GLIPIZIDE 10 MG/1
5 TABLET ORAL 2 TIMES DAILY
Qty: 180 TAB | Refills: 1
Start: 2019-03-05 | End: 2019-06-05 | Stop reason: SDUPTHER

## 2019-03-05 NOTE — PROGRESS NOTES
1. Have you been to the ER, urgent care clinic since your last visit? Hospitalized since your last visit? No    2. Have you seen or consulted any other health care providers outside of the [address] [place] [address] since your last visit? Include any pap smears or colon screening.  No

## 2019-03-05 NOTE — PATIENT INSTRUCTIONS
Medicare Wellness Visit, Female     The best way to live healthy is to have a lifestyle where you eat a well-balanced diet, exercise regularly, limit alcohol use, and quit all forms of tobacco/nicotine, if applicable. Regular preventive services are another way to keep healthy. Preventive services (vaccines, screening tests, monitoring & exams) can help personalize your care plan, which helps you manage your own care. Screening tests can find health problems at the earliest stages, when they are easiest to treat. [facility] follows the current, evidence-based guidelines published by the [facility] ([facility]) when recommending preventive services for our patients. Because we follow these guidelines, sometimes recommendations change over time as research supports it. (For example, mammograms used to be recommended annually. Even though Medicare will still pay for an annual mammogram, the newer guidelines recommend a mammogram every two years for women of average risk.)  Of course, you and your doctor may decide to screen more often for some diseases, based on your risk and your health status. Preventive services for you include:  - Medicare offers their members a free annual wellness visit, which is time for you and your primary care provider to discuss and plan for your preventive service needs. Take advantage of this benefit every year!  -All adults over the age of 72 should receive the recommended pneumonia vaccines. Current USPSTF guidelines recommend a series of two vaccines for the best pneumonia protection.   -All adults should have a flu vaccine yearly and a tetanus vaccine every 10 years. All adults age 61 and older should receive a shingles vaccine once in their lifetime.    -A bone mass density test is recommended when a woman turns 65 to screen for osteoporosis. This test is only recommended one time, as a screening.  Some providers will use this same test as a disease monitoring tool if you already have osteoporosis. -All adults age 38-68 who are overweight should have a diabetes screening test once every three years.   -Other screening tests and preventive services for persons with diabetes include: an eye exam to screen for diabetic retinopathy, a kidney function test, a foot exam, and stricter control over your cholesterol.   -Cardiovascular screening for adults with routine risk involves an electrocardiogram (ECG) at intervals determined by your doctor.   -Colorectal cancer screenings should be done for adults age 54-65 with no increased risk factors for colorectal cancer. There are a number of acceptable methods of screening for this type of cancer. Each test has its own benefits and drawbacks. Discuss with your doctor what is most appropriate for you during your annual wellness visit. The different tests include: colonoscopy (considered the best screening method), a fecal occult blood test, a fecal DNA test, and sigmoidoscopy. -Breast cancer screenings are recommended every other year for women of normal risk, age 54-69.  -Cervical cancer screenings for women over age 72 are only recommended with certain risk factors.   -All adults born between [facility] should be screened once for Hepatitis C.      Here is a list of your current Health Maintenance items (your personalized list of preventive services) with a due date:  Health Maintenance Due   Topic Date Due    Shingles Vaccine (1 of 2) 10/29/2002    Annual Well Visit  11/21/2018

## 2019-03-05 NOTE — ACP (ADVANCE CARE PLANNING)
Advance Care Planning (ACP) Provider Note - Comprehensive     Date of ACP Conversation: 03/05/19  Persons included in Conversation:  patient  Length of ACP Conversation in minutes:  <16 minutes (Non-Billable)    Authorized Decision Maker (if patient is incapable of making informed decisions): This person is:  Healthcare Agent/Medical Power of  under Advance Directive          General ACP for ALL Patients with Decision Making Capacity:   Importance of advance care planning, including choosing a healthcare agent to communicate patient's healthcare decisions if patient lost the ability to make decisions, such as after a sudden illness or accident  Understanding of the healthcare agent role was assessed and information provided  Exploration of values, goals, and preferences if recovery is not expected, even with continued medical treatment in the event of: Imminent death  Severe, permanent brain injury  \"In these circumstances, what matters most to you? \"  Care focused more on comfort or quality of life. Opportunity offered to explore how cultural, Restoration, spiritual, or personal beliefs would affect decisions for future care     Review of Existing Advance Directive:  Does this advance directive still reflect your preferences? Yes (Provide new form/Refer for assistance in updating)    For Serious or Chronic Illness:  Understanding of medical condition    Understanding of CPR, goals and expected outcomes, benefits and burdens discussed. Information on CPR success rates provided (e.g. for CPR in hospital, survival to d/c at two weeks is 22%, for chronically ill or elderly/frail survival is less than 3%); Individual asked to communicate understanding of information in his/her own words.   Explored fears and concerns regarding CPR or possible outcomes    Interventions Provided:  Reviewed existing Advance Directive   Recommended review of completed ACP document annually or upon change in health status

## 2019-03-05 NOTE — PROGRESS NOTES
History of Present Illness  Olivia Favre is a 79 y.o. female who presents today for management of    Chief Complaint   Patient presents with    Annual Wellness Visit    Cholesterol Problem    Diabetes    Hypertension       Diabetes Mellitus:  She has diabetes mellitus, and  hypertension and hyperlipidemia. Diabetic ROS - medication compliance: compliant all of the time, diabetic diet compliance: compliant all of the time, home glucose monitoring: fasting values range 130-160s. Lab review: orders written for new lab studies as appropriate; see orders. Cardiovascular Review:  The patient has hypertension and hyperlipidemia. Diet and Lifestyle: generally follows a low fat low cholesterol diet, generally follows a low sodium diet, sedentary, nonsmoker  Home BP Monitoring: is not measured at home. Pertinent ROS: taking medications as instructed, no medication side effects noted, no TIA's, no chest pain on exertion, no dyspnea on exertion, no swelling of ankles. Problem List  Patient Active Problem List    Diagnosis Date Noted    Severe obesity (BMI 35.0-39.9) 06/05/2018    Hyperlipidemia 06/05/2018    Connective tissue disorder ([facility] [address] 01/12/2018    ACP (advance care planning) 11/20/2017    History of breast cancer 11/20/2017    IBS (irritable bowel syndrome)     Diabetes mellitus, type 2 ([address]     HTN (hypertension) 04/05/2010       Past Medical History  Past Medical History:   Diagnosis Date    Angina pectoris     w/ silent ischemia    Breast cancer ([address]     Cancer ([address] 1987    breast    Chronic insomnia     Diabetes ([address]     Diabetes mellitus type II 2003    Exogenous obesity     HTN (hypertension) 4/5/2010    Hypertension     IBS (irritable bowel syndrome)     Menopause     SLE (systemic lupus erythematosus) ([address]         Surgical History  Past Surgical History:   Procedure Laterality Date    EGD  02/20/09    HX BREAST BIOPSY  2003    right breast neg.     HX BREAST RECONSTRUCTION  1988    left breast    HX BREAST RECONSTRUCTION Left     HX BREAST REDUCTION Right 1987    HX CHOLECYSTECTOMY  1990    HX CHOLECYSTECTOMY      HX HEART CATHETERIZATION  11/28/03    left, normal    HX MASTECTOMY  1987    left breast    HX MASTECTOMY Left     [place] COLONOSCOPY FLX DX W/COLLJ SPEC WHEN PFRMD  2005 3/09    abby --egd capsule colon (NEG)        Current Medications  Current Outpatient Medications   Medication Sig    empagliflozin (JARDIANCE) 10 mg tablet Take 1 Tab by mouth daily.  BYDUREON 2 mg/0.65 mL pnij TAKE 2 MG BY SUBCUTANEOUS ROUTE EVERY SEVEN (7) DAYS    glipiZIDE (GLUCOTROL) 10 mg tablet Take 1 Tab by mouth two (2) times a day.  metFORMIN (GLUCOPHAGE) 500 mg tablet Take 1 Tab by mouth two (2) times daily (with meals).  ramipril (ALTACE) 10 mg capsule Take 1 Cap by mouth daily.  carvedilol (COREG) 6.25 mg tablet Take 6.25 mg by mouth two (2) times a day.  Blood Pressure Monitor (BLOOD PRESSURE KIT) kit Use twice daily    ibuprofen (MOTRIN) 600 mg tablet Take 1 Tab by mouth every six (6) hours as needed.  hydroxychloroquine (PLAQUENIL) 200 mg tablet Take 200 mg by mouth daily.  hydroCHLOROthiazide (HYDRODIURIL) 12.5 mg tablet Take 12.5 mg by mouth daily.  aspirin delayed-release 81 mg tablet Take  by mouth daily.  colesevelam (WELCHOL) 625 mg tablet Take 1,875 mg by mouth two (2) times daily (with meals). No current facility-administered medications for this visit.         Allergies/Drug Reactions  Allergies   Allergen Reactions    Latex Hives    Atorvastatin Nausea Only    Metformin Diarrhea    Penicillins Hives        Family History  Family History   Problem Relation Age of Onset    Diabetes Mother     Heart Disease Father     Heart Attack Father     Breast Cancer Paternal Aunt     Diabetes Brother     Asthma Brother         Social History  Social History     Socioeconomic History    Marital status:      Spouse name: Not on file    Number of children: Not on file    Years of education: Not on file    Highest education level: Not on file   Social Needs    Financial resource strain: Not on file    Food insecurity - worry: Not on file    Food insecurity - inability: Not on file    Transportation needs - medical: Not on file    Transportation needs - non-medical: Not on file   Occupational History    Not on file   Tobacco Use    Smoking status: Never Smoker    Smokeless tobacco: Never Used   Substance and Sexual Activity    Alcohol use: No    Drug use: No    Sexual activity: Yes     Partners: Male   Other Topics Concern    Not on file   Social History Narrative    ** Merged History Encounter **            Review of Systems  Negative except as mentioned in HPI      Physical Exam  Vital signs:   Vitals:    03/05/19 0910   BP: 139/71   Pulse: 86   Resp: 20   Temp: 97.2 °F (36.2 °C)   TempSrc: Oral   SpO2: 97%   Weight: 208 lb (94.3 kg)   Height: 5' 2\" (1.575 m)       General: alert, oriented, not in distress  Eyes: clear conjunctivae, anicteric sclerae, full and equal ROMs  Chest/Lungs: clear breath sounds, no wheezing or crackles  Breasts: right breast normal without mass, skin or nipple changes or axillary nodes, left breast normal without mass, skin or nipple changes or axillary nodes  Heart: normal rate, regular rhythm, no murmur  Extremities: no focal deformities, no edema  Neuro: AAOx3, CN's grossly intact  Skin: no visible abnormalities    Laboratory/Tests:  Component      Latest Ref Rng & Units 9/5/2018 9/5/2018 9/5/2018 9/5/2018          10:30 AM 10:30 AM 10:30 AM 10:30 AM   WBC      4.6 - 13.2 K/uL    7.0   RBC      4.20 - 5.30 M/uL    4.89   HGB      12.0 - 16.0 g/dL    12.3   HCT      35.0 - 45.0 %    38.5   MCV      74.0 - 97.0 FL    78.7   MCH      24.0 - 34.0 PG    25.2   MCHC      31.0 - 37.0 g/dL    31.9   RDW      11.6 - 14.5 %    14.8 (H)   PLATELET      991 - 110 K/uL    239   MPV      9.2 - 11.8 FL 10.1   NEUTROPHILS      40 - 73 %    66   LYMPHOCYTES      21 - 52 %    22   MONOCYTES      3 - 10 %    8   EOSINOPHILS      0 - 5 %    4   BASOPHILS      0 - 2 %    0   ABS. NEUTROPHILS      1.8 - 8.0 K/UL    4.6   ABS. LYMPHOCYTES      0.9 - 3.6 K/UL    1.6   ABS. MONOCYTES      0.05 - 1.2 K/UL    0.5   ABS. EOSINOPHILS      0.0 - 0.4 K/UL    0.3   ABS. BASOPHILS      0.0 - 0.1 K/UL    0.0   DF          AUTOMATED   Sodium      136 - 145 mmol/L  139     Potassium      3.5 - 5.5 mmol/L  4.6     Chloride      100 - 108 mmol/L  104     CO2      21 - 32 mmol/L  26     Anion gap      3.0 - 18 mmol/L  9     Glucose      74 - 99 mg/dL  180 (H)     BUN      7.0 - 18 MG/DL  25 (H)     Creatinine      0.6 - 1.3 MG/DL  1.10     BUN/Creatinine ratio      12 - 20    23 (H)     GFR est AA      >60 ml/min/1.73m2  >60     GFR est non-AA      >60 ml/min/1.73m2  50 (L)     Calcium      8.5 - 10.1 MG/DL  9.1     Bilirubin, total      0.2 - 1.0 MG/DL  0.3     ALT (SGPT)      13 - 56 U/L  38     AST      15 - 37 U/L  18     Alk.  phosphatase      45 - 117 U/L  65     Protein, total      6.4 - 8.2 g/dL  7.8     Albumin      3.4 - 5.0 g/dL  3.8     Globulin      2.0 - 4.0 g/dL  4.0     A-G Ratio      0.8 - 1.7    1.0     Cholesterol, total      <200 MG/DL   160    Triglyceride      <150 MG/DL   121    HDL Cholesterol      40 - 60 MG/DL   68 (H)    LDL, calculated      0 - 100 MG/DL   67.8    VLDL, calculated      MG/DL   24.2    CHOL/HDL Ratio      0 - 5.0     2.4    TSH      0.36 - 3.74 uIU/mL 0.84        Component      Latest Ref Rng & Units 9/5/2018          10:30 AM   Color       YELLOW   Appearance       CLEAR   Specific gravity      1.005 - 1.030   1.018   pH (UA)      5.0 - 8.0   5.5   Protein      NEG mg/dL NEGATIVE   Glucose      NEG mg/dL NEGATIVE   Ketone      NEG mg/dL NEGATIVE   Bilirubin      NEG   NEGATIVE   Blood      NEG   NEGATIVE   Urobilinogen      0.2 - 1.0 EU/dL 0.2   Nitrites      NEG   NEGATIVE   Leukocyte Esterase      NEG   NEGATIVE       Assessment/Plan:    1. Type 2 diabetes mellitus without complication, without long-term current use of insulin (HCC)  - AMB POC HEMOGLOBIN A1C 8.2%  - start empagliflozin (JARDIANCE) 10 mg tablet; Take 1 Tab by mouth daily. Dispense: 90 Tab; Refill: 0  - continue Bydureon and metformin  - decrease glipizide to 5mg BID - plan to taper off    2. Connective tissue disorder (HCC)  - stable  - continue Plaquenil  - follow-up with Dr. Tom Diallo    3. Essential hypertension  - controlled    4. Hyperlipidemia, unspecified hyperlipidemia type  - controlled      Follow-up Disposition:  Return in about 3 months (around 6/5/2019) for dm, htn, hld. I have discussed the diagnosis with the patient and the intended plan as seen in the above orders. The patient has received an after-visit summary and questions were answered concerning future plans. I have discussed medication side effects and warnings with the patient as well. I have reviewed the plan of care with the patient, accepted their input and they are in agreement with the treatment goals. Nanci Gray MD  March 5, 2019        This is an Initial Medicare Annual Wellness Exam (AWV) (Performed 12 months after IPPE or effective date of Medicare Part B enrollment, Once in a lifetime)    I have reviewed the patient's medical history in detail and updated the computerized patient record. History     Past Medical History:   Diagnosis Date    Angina pectoris     w/ silent ischemia    [facility] ([facility] [address]     Cancer ([facility] [address] 1987    breast    Chronic insomnia     Diabetes ([address]     Diabetes mellitus type II 2003    Exogenous obesity     HTN (hypertension) 4/5/2010    Hypertension     IBS (irritable bowel syndrome)     Menopause     SLE (systemic lupus erythematosus) ([address]       Past Surgical History:   Procedure Laterality Date    EGD  02/20/09    HX BREAST BIOPSY  2003    right breast neg.     HX BREAST RECONSTRUCTION  1988    left breast    HX BREAST RECONSTRUCTION Left     HX BREAST REDUCTION Right 1987    HX CHOLECYSTECTOMY  1990    HX CHOLECYSTECTOMY      HX HEART CATHETERIZATION  11/28/03    left, normal    HX MASTECTOMY  1987    left breast    HX MASTECTOMY Left     [place] COLONOSCOPY FLX DX W/COLLJ SPEC WHEN PFRMD  2005 3/09    abby --egd capsule colon (NEG)     Current Outpatient Medications   Medication Sig Dispense Refill    empagliflozin (JARDIANCE) 10 mg tablet Take 1 Tab by mouth daily. 90 Tab 0    BYDUREON 2 mg/0.65 mL pnij TAKE 2 MG BY SUBCUTANEOUS ROUTE EVERY SEVEN (7) DAYS 4 Adjustable Dose Pre-filled Pen Syringe 2    glipiZIDE (GLUCOTROL) 10 mg tablet Take 1 Tab by mouth two (2) times a day. 180 Tab 1    metFORMIN (GLUCOPHAGE) 500 mg tablet Take 1 Tab by mouth two (2) times daily (with meals). 180 Tab 1    ramipril (ALTACE) 10 mg capsule Take 1 Cap by mouth daily. 90 Cap 3    carvedilol (COREG) 6.25 mg tablet Take 6.25 mg by mouth two (2) times a day. 5    Blood Pressure Monitor (BLOOD PRESSURE KIT) kit Use twice daily 1 Kit 0    ibuprofen (MOTRIN) 600 mg tablet Take 1 Tab by mouth every six (6) hours as needed. 1    hydroxychloroquine (PLAQUENIL) 200 mg tablet Take 200 mg by mouth daily.  hydroCHLOROthiazide (HYDRODIURIL) 12.5 mg tablet Take 12.5 mg by mouth daily.  aspirin delayed-release 81 mg tablet Take  by mouth daily.  colesevelam (WELCHOL) 625 mg tablet Take 1,875 mg by mouth two (2) times daily (with meals).        Allergies   Allergen Reactions    Latex Hives    Atorvastatin Nausea Only    Metformin Diarrhea    Penicillins Hives     Family History   Problem Relation Age of Onset    Diabetes Mother     Heart Disease Father     Heart Attack Father     Breast Cancer Paternal Aunt     Diabetes Brother     Asthma Brother      Social History     Tobacco Use    Smoking status: Never Smoker    Smokeless tobacco: Never Used   Substance Use Topics    Alcohol use: No     Patient Active Problem List   Diagnosis Code    HTN (hypertension) I10    Diabetes mellitus, type 2 (HCC) E11.9    IBS (irritable bowel syndrome) K58.9    ACP (advance care planning) Z71.89    History of breast cancer Z85.3    Connective tissue disorder ([facility] [address] M35.9    Severe obesity (BMI 35.0-39. 9) E66.01    Hyperlipidemia E78.5       Depression Risk Factor Screening:     3 most recent PHQ Screens 3/5/2019   Little interest or pleasure in doing things Not at all   Feeling down, depressed, irritable, or hopeless Not at all   Total Score PHQ 2 0     Alcohol Risk Factor Screening: You do not drink alcohol or very rarely. Functional Ability and Level of Safety:     Hearing Loss  Hearing is good. Activities of Daily Living  The home contains: no safety equipment. Patient does total self care    Fall Risk  Fall Risk Assessment, last 12 mths 3/5/2019   Able to walk? Yes   Fall in past 12 months? No       Abuse Screen  Patient is not abused    Cognitive Screening   Evaluation of Cognitive Function:  Has your family/caregiver stated any concerns about your memory: no  Normal    Patient Care Team   Patient Care Team:  Cookie Beatty MD as PCP - General (Internal Medicine)  Joel Haro MD (Internal Medicine)  Margaux Bustillo MD as Physician (Rheumatology)  Ирина Foreman MD as Physician (Ophthalmology)  Macrina Espinoza MD (Gastroenterology)    Assessment/Plan   Education and counseling provided:  Are appropriate based on today's review and evaluation  End-of-Life planning (with patient's consent)  Diabetes outpatient self-management training services    Diagnoses and all orders for this visit:    1. Type 2 diabetes mellitus without complication, without long-term current use of insulin ([facility])  -     AMB POC HEMOGLOBIN A1C  -     empagliflozin (JARDIANCE) 10 mg tablet; Take 1 Tab by mouth daily. 2. Connective tissue disorder ([facility] [address]    3. Essential hypertension    4. Hyperlipidemia, unspecified hyperlipidemia type    5. Initial Medicare annual wellness visit    6. Screening for alcoholism  -     [place] ANNUAL ALCOHOL SCREEN 15 MIN    7.  ACP (advance care planning)         Health Maintenance Due   Topic Date Due    Shingrix Vaccine Age 49> (1 of 2) 10/29/2002    MEDICARE YEARLY EXAM  11/21/2018

## 2019-03-29 ENCOUNTER — TELEPHONE (OUTPATIENT)
Dept: FAMILY MEDICINE CLINIC | Age: 69
End: 2019-03-29

## 2019-03-29 NOTE — TELEPHONE ENCOUNTER
BS of  are acceptable. Continue current dose of glipizide. If she starts having hypoglycemic episodes (blood sugar less than 80, symptoms), d/c glipizide.

## 2019-03-29 NOTE — TELEPHONE ENCOUNTER
Patient called in ref to taking medication glipizide. She states that during the first week her readings were high and the second week were between 95 and 122. Patient would like a call back for further directions.  Please assist.

## 2019-06-01 ENCOUNTER — TELEPHONE (OUTPATIENT)
Dept: FAMILY MEDICINE CLINIC | Age: 69
End: 2019-06-01

## 2019-06-01 DIAGNOSIS — E11.9 TYPE 2 DIABETES MELLITUS WITHOUT COMPLICATION, WITHOUT LONG-TERM CURRENT USE OF INSULIN (HCC): ICD-10-CM

## 2019-06-03 RX ORDER — EMPAGLIFLOZIN 10 MG/1
TABLET, FILM COATED ORAL
Qty: 90 TAB | Refills: 2 | Status: SHIPPED | OUTPATIENT
Start: 2019-06-03 | End: 2019-06-05

## 2019-06-04 NOTE — TELEPHONE ENCOUNTER
Patient called stating the medication is $600, she loved the medication but too expensive. Requesting etheir something different or coupon Please advise, thank you.

## 2019-06-04 NOTE — TELEPHONE ENCOUNTER
Unfortunately, Medicare does not honor coupons. Will discuss other options with patient on her appointment tomorrow.

## 2019-06-05 ENCOUNTER — HOSPITAL ENCOUNTER (OUTPATIENT)
Dept: LAB | Age: 69
Discharge: HOME | End: 2019-06-05
Payer: MEDICARE

## 2019-06-05 ENCOUNTER — OFFICE VISIT (OUTPATIENT)
Dept: FAMILY MEDICINE CLINIC | Age: 69
End: 2019-06-05

## 2019-06-05 VITALS
DIASTOLIC BLOOD PRESSURE: 82 MMHG | RESPIRATION RATE: 16 BRPM | TEMPERATURE: 98.2 F | HEART RATE: 82 BPM | WEIGHT: 199 LBS | HEIGHT: 62 IN | SYSTOLIC BLOOD PRESSURE: 121 MMHG | BODY MASS INDEX: 36.62 KG/M2

## 2019-06-05 DIAGNOSIS — M35.9 CONNECTIVE TISSUE DISORDER (HCC): ICD-10-CM

## 2019-06-05 DIAGNOSIS — I10 ESSENTIAL HYPERTENSION: ICD-10-CM

## 2019-06-05 DIAGNOSIS — E11.9 TYPE 2 DIABETES MELLITUS WITHOUT COMPLICATION, WITHOUT LONG-TERM CURRENT USE OF INSULIN (HCC): ICD-10-CM

## 2019-06-05 DIAGNOSIS — E78.5 HYPERLIPIDEMIA, UNSPECIFIED HYPERLIPIDEMIA TYPE: ICD-10-CM

## 2019-06-05 DIAGNOSIS — E11.9 TYPE 2 DIABETES MELLITUS WITHOUT COMPLICATION, WITHOUT LONG-TERM CURRENT USE OF INSULIN (HCC): Primary | ICD-10-CM

## 2019-06-05 LAB
ANION GAP SERPL CALC-SCNC: 7 MMOL/L (ref 3–18)
BUN SERPL-MCNC: 23 MG/DL (ref 7–18)
BUN/CREAT SERPL: 20 (ref 12–20)
CALCIUM SERPL-MCNC: 9 MG/DL (ref 8.5–10.1)
CHLORIDE SERPL-SCNC: 102 MMOL/L (ref 100–108)
CO2 SERPL-SCNC: 30 MMOL/L (ref 21–32)
CREAT SERPL-MCNC: 1.15 MG/DL (ref 0.6–1.3)
GLUCOSE SERPL-MCNC: 151 MG/DL (ref 74–99)
HBA1C MFR BLD HPLC: 7.4 %
POTASSIUM SERPL-SCNC: 4.2 MMOL/L (ref 3.5–5.5)
SODIUM SERPL-SCNC: 139 MMOL/L (ref 136–145)

## 2019-06-05 PROCEDURE — 36415 COLL VENOUS BLD VENIPUNCTURE: CPT

## 2019-06-05 PROCEDURE — 80048 BASIC METABOLIC PNL TOTAL CA: CPT

## 2019-06-05 RX ORDER — GLIPIZIDE 10 MG/1
10 TABLET ORAL 2 TIMES DAILY
Qty: 180 TAB | Refills: 3 | Status: SHIPPED | OUTPATIENT
Start: 2019-06-05 | End: 2019-11-07

## 2019-06-05 NOTE — PROGRESS NOTES
History of Present Illness  Eric Tilley is a 79 y.o. female who presents today for management of    Chief Complaint   Patient presents with    Hypertension    Cholesterol Problem    Diabetes       Diabetes Mellitus:  She has diabetes mellitus, and  hypertension and hyperlipidemia. Diabetic ROS - medication compliance: compliant all of the time, diabetic diet compliance: compliant all of the time, home glucose monitoring: values are usually normal, further diabetic ROS: no polyuria or polydipsia, no chest pain, dyspnea or TIA's, no numbness, tingling or pain in extremities. Lab review: orders written for new lab studies as appropriate; see orders. Cardiovascular Review:  The patient has hypertension and hyperlipidemia. Diet and Lifestyle: generally follows a low fat low cholesterol diet, generally follows a low sodium diet, sedentary, nonsmoker  Home BP Monitoring: is not measured at home. Pertinent ROS: taking medications as instructed, no medication side effects noted, no TIA's, no chest pain on exertion, no dyspnea on exertion, no swelling of ankles.      Problem List  Patient Active Problem List    Diagnosis Date Noted    Severe obesity (BMI 35.0-39.9) 06/05/2018    Hyperlipidemia 06/05/2018    Connective tissue disorder ([address] 01/12/2018    ACP (advance care planning) 11/20/2017    History of breast cancer 11/20/2017    IBS (irritable bowel syndrome)     Diabetes mellitus, type 2 ([address]     HTN (hypertension) 04/05/2010       Past Medical History  Past Medical History:   Diagnosis Date    Angina pectoris     w/ silent ischemia    Breast cancer ([address]     Cancer ([address] 1987    breast    Chronic insomnia     Diabetes ([address]     Diabetes mellitus type II 2003    Exogenous obesity     HTN (hypertension) 4/5/2010    Hypertension     IBS (irritable bowel syndrome)     Menopause     SLE (systemic lupus erythematosus) ([address]         Surgical History  Past Surgical History:   Procedure Laterality Date    EGD  02/20/09    HX BREAST BIOPSY  2003    right breast neg.  HX BREAST RECONSTRUCTION  1988    left breast    HX BREAST RECONSTRUCTION Left     HX BREAST REDUCTION Right 1987    HX CHOLECYSTECTOMY  1990    HX CHOLECYSTECTOMY      HX HEART CATHETERIZATION  11/28/03    left, normal    HX MASTECTOMY  1987    left breast    HX MASTECTOMY Left     [place] COLONOSCOPY FLX DX W/COLLJ SPEC WHEN PFRMD  2005 3/09    abby --egd capsule colon (NEG)        Current Medications  Current Outpatient Medications   Medication Sig    glipiZIDE (GLUCOTROL) 10 mg tablet Take 1 Tab by mouth two (2) times a day.  metFORMIN (GLUCOPHAGE) 500 mg tablet TAKE 1 TABLET BY MOUTH TWICE A DAY WITH MEALS    BYDUREON 2 mg/0.65 mL pnij TAKE 2 MG BY SUBCUTANEOUS ROUTE EVERY SEVEN (7) DAYS    ramipril (ALTACE) 10 mg capsule Take 1 Cap by mouth daily.  carvedilol (COREG) 6.25 mg tablet Take 6.25 mg by mouth two (2) times a day.  Blood Pressure Monitor (BLOOD PRESSURE KIT) kit Use twice daily    ibuprofen (MOTRIN) 600 mg tablet Take 1 Tab by mouth every six (6) hours as needed.  hydroxychloroquine (PLAQUENIL) 200 mg tablet Take 200 mg by mouth daily.  hydroCHLOROthiazide (HYDRODIURIL) 12.5 mg tablet Take 12.5 mg by mouth daily.  aspirin delayed-release 81 mg tablet Take  by mouth daily.  colesevelam (WELCHOL) 625 mg tablet Take 1,875 mg by mouth two (2) times daily (with meals). No current facility-administered medications for this visit.         Allergies/Drug Reactions  Allergies   Allergen Reactions    Latex Hives    Atorvastatin Nausea Only    Metformin Diarrhea    Penicillins Hives        Family History  Family History   Problem Relation Age of Onset    Diabetes Mother     Heart Disease Father     Heart Attack Father     Breast Cancer Paternal Aunt     Diabetes Brother     Asthma Brother         Social History  Social History     Socioeconomic History    Marital status:  Spouse name: Not on file    Number of children: Not on file    Years of education: Not on file    Highest education level: Not on file   Occupational History    Not on file   Social Needs    Financial resource strain: Not on file    Food insecurity:     Worry: Not on file     Inability: Not on file    Transportation needs:     Medical: Not on file     Non-medical: Not on file   Tobacco Use    Smoking status: Never Smoker    Smokeless tobacco: Never Used   Substance and Sexual Activity    Alcohol use: No    Drug use: No    Sexual activity: Yes     Partners: Male   Lifestyle    Physical activity:     Days per week: Not on file     Minutes per session: Not on file    Stress: Not on file   Relationships    Social connections:     Talks on phone: Not on file     Gets together: Not on file     Attends [employer] service: Not on file     Active member of club or organization: Not on file     Attends meetings of clubs or organizations: Not on file     Relationship status: Not on file    Intimate partner violence:     Fear of current or ex partner: Not on file     Emotionally abused: Not on file     Physically abused: Not on file     Forced sexual activity: Not on file   Other Topics Concern    Not on file   Social History Narrative    ** Merged History Encounter **            Review of Systems  Negative except as mentioned in HPI      Physical Exam  Vital signs:   Vitals:    06/05/19 0801   BP: 121/82   Pulse: 82   Resp: 16   Temp: 98.2 °F (36.8 °C)   TempSrc: Oral   Weight: 199 lb (90.3 kg)   Height: 5' 2\" (1.575 m)       General: alert, oriented, not in distress  Eyes: clear conjunctivae, anicteric sclerae, full and equal ROMs  Chest/Lungs: clear breath sounds, no wheezing or crackles  Heart: normal rate, regular rhythm, no murmur  Extremities: no focal deformities, no edema  Neuro: AAOx3, CN's grossly intact  Skin: no visible abnormalities      Laboratory/Tests:  Component      Latest Ref Rng & Units 12/5/2018 9/5/2018 9/5/2018 9/5/2018          10:56 AM 10:30 AM 10:30 AM 10:30 AM   Sodium      136 - 145 mmol/L   139    Potassium      3.5 - 5.5 mmol/L   4.6    Chloride      100 - 108 mmol/L   104    CO2      21 - 32 mmol/L   26    Anion gap      3.0 - 18 mmol/L   9    Glucose      74 - 99 mg/dL   180 (H)    BUN      7.0 - 18 MG/DL   25 (H)    Creatinine      0.6 - 1.3 MG/DL   1.10    BUN/Creatinine ratio      12 - 20     23 (H)    GFR est AA      >60 ml/min/1.73m2   >60    GFR est non-AA      >60 ml/min/1.73m2   50 (L)    Calcium      8.5 - 10.1 MG/DL   9.1    Bilirubin, total      0.2 - 1.0 MG/DL   0.3    ALT (SGPT)      13 - 56 U/L   38    AST      15 - 37 U/L   18    Alk. phosphatase      45 - 117 U/L   65    Protein, total      6.4 - 8.2 g/dL   7.8    Albumin      3.4 - 5.0 g/dL   3.8    Globulin      2.0 - 4.0 g/dL   4.0    A-G Ratio      0.8 - 1.7     1.0    Cholesterol, total      <200 MG/DL    160   Triglyceride      <150 MG/DL    121   HDL Cholesterol      40 - 60 MG/DL    68 (H)   LDL, calculated      0 - 100 MG/DL    67.8   VLDL, calculated      MG/DL    24.2   CHOL/HDL Ratio      0 - 5.0      2.4   Microalbumin,urine random      0 - 3.0 MG/DL 0.63      Creatinine, urine      30 - 125 mg/dL 78.00      Microalbumin/Creat. Ratio      0 - 30 mg/g 8      TSH      0.36 - 3.74 uIU/mL  0.84       Component      Latest Ref Rng & Units 9/5/2018 9/5/2018          10:30 AM 10:30 AM   WBC      4.6 - 13.2 K/uL  7.0   RBC      4.20 - 5.30 M/uL  4.89   HGB      12.0 - 16.0 g/dL  12.3   HCT      35.0 - 45.0 %  38.5   MCV      74.0 - 97.0 FL  78.7   MCH      24.0 - 34.0 PG  25.2   MCHC      31.0 - 37.0 g/dL  31.9   RDW      11.6 - 14.5 %  14.8 (H)   PLATELET      128 - 343 K/uL  239   MPV      9.2 - 11.8 FL  10.1   NEUTROPHILS      40 - 73 %  66   LYMPHOCYTES      21 - 52 %  22   MONOCYTES      3 - 10 %  8   EOSINOPHILS      0 - 5 %  4   BASOPHILS      0 - 2 %  0   ABS. NEUTROPHILS      1.8 - 8.0 K/UL  4.6   ABS. LYMPHOCYTES      0.9 - 3.6 K/UL  1.6   ABS. MONOCYTES      0.05 - 1.2 K/UL  0.5   ABS. EOSINOPHILS      0.0 - 0.4 K/UL  0.3   ABS. BASOPHILS      0.0 - 0.1 K/UL  0.0   DF        AUTOMATED   Color       YELLOW    Appearance       CLEAR    Specific gravity      1.005 - 1.030   1.018    pH (UA)      5.0 - 8.0   5.5    Protein      NEG mg/dL NEGATIVE    Glucose      NEG mg/dL NEGATIVE    Ketone      NEG mg/dL NEGATIVE    Bilirubin      NEG   NEGATIVE    Blood      NEG   NEGATIVE    Urobilinogen      0.2 - 1.0 EU/dL 0.2    Nitrites      NEG   NEGATIVE    Leukocyte Esterase      NEG   NEGATIVE        Assessment/Plan:      1. Type 2 diabetes mellitus without complication, without long-term current use of insulin (HCC)  - controlled  - AMB POC HEMOGLOBIN J3S 2.5%  - METABOLIC PANEL, BASIC; Future  - stop Jardiance due to cost  - increase glipiZIDE (GLUCOTROL) 10 mg tablet; Take 1 Tab by mouth two (2) times a day. Dispense: 180 Tab; Refill: 3  - continue Bydureon and metformin    2. Essential hypertension  - controlled    3. Hyperlipidemia, unspecified hyperlipidemia type  - controlled    4. Connective tissue disorder ([facility]  - stable  - continue Plaquenil  - rheumatology follow-up      Follow-up and Dispositions    · Return in about 3 months (around 9/5/2019) for DM, HTN, HLD. I have discussed the diagnosis with the patient and the intended plan as seen in the above orders. The patient has received an after-visit summary and questions were answered concerning future plans. I have discussed medication side effects and warnings with the patient as well. I have reviewed the plan of care with the patient, accepted their input and they are in agreement with the treatment goals.        Brissa Cao MD  June 5, 2019

## 2019-06-05 NOTE — PROGRESS NOTES
1. Have you been to the ER, urgent care clinic since your last visit? Hospitalized since your last visit? No    2. Have you seen or consulted any other health care providers outside of the [address] [place] [address] since your last visit? Include any pap smears or colon screening.  No

## 2019-07-29 RX ORDER — CARVEDILOL 6.25 MG/1
6.25 TABLET ORAL 2 TIMES DAILY
Refills: 5 | OUTPATIENT
Start: 2019-07-29

## 2019-07-30 NOTE — TELEPHONE ENCOUNTER
Spoke to patient, per patient Dr. Claritza Finney went to a [facility] and was last prescriber,per pt Dr. Angelica Egan was aware that she was taking it. Pt have a couple of pills to last. Please advise.

## 2019-07-31 RX ORDER — CARVEDILOL 6.25 MG/1
6.25 TABLET ORAL 2 TIMES DAILY
Qty: 60 TAB | Refills: 2 | Status: SHIPPED | OUTPATIENT
Start: 2019-07-31 | End: 2019-10-28 | Stop reason: SDUPTHER

## 2019-08-30 ENCOUNTER — TELEPHONE (OUTPATIENT)
Dept: FAMILY MEDICINE CLINIC | Age: 69
End: 2019-08-30

## 2019-09-05 DIAGNOSIS — E11.9 TYPE 2 DIABETES MELLITUS WITHOUT COMPLICATION, WITHOUT LONG-TERM CURRENT USE OF INSULIN (HCC): Primary | ICD-10-CM

## 2019-09-05 NOTE — TELEPHONE ENCOUNTER
Patient was contacted and apologized for the delay. I also informed the patient that refill is now sent to the pharmacy. Pt has no further question or concern when asked.

## 2019-10-21 RX ORDER — RAMIPRIL 10 MG/1
CAPSULE ORAL
Qty: 90 CAP | Refills: 2 | Status: SHIPPED | OUTPATIENT
Start: 2019-10-21 | End: 2020-07-13

## 2019-10-24 DIAGNOSIS — I10 ESSENTIAL HYPERTENSION: Primary | ICD-10-CM

## 2019-10-28 RX ORDER — CARVEDILOL 6.25 MG/1
6.25 TABLET ORAL 2 TIMES DAILY
Qty: 60 TAB | Refills: 11 | Status: SHIPPED | OUTPATIENT
Start: 2019-10-28 | End: 2020-10-15 | Stop reason: SDUPTHER

## 2019-10-28 RX ORDER — CARVEDILOL 6.25 MG/1
TABLET ORAL
Qty: 180 TAB | Refills: 0 | OUTPATIENT
Start: 2019-10-28

## 2019-10-30 RX ORDER — HYDROCHLOROTHIAZIDE 12.5 MG/1
12.5 TABLET ORAL DAILY
Qty: 90 TAB | Refills: 3 | Status: SHIPPED | OUTPATIENT
Start: 2019-10-30 | End: 2020-10-15 | Stop reason: SDUPTHER

## 2019-11-07 ENCOUNTER — HOSPITAL ENCOUNTER (OUTPATIENT)
Dept: LAB | Age: 69
Discharge: HOME | End: 2019-11-07
Payer: MEDICARE

## 2019-11-07 ENCOUNTER — OFFICE VISIT (OUTPATIENT)
Dept: FAMILY MEDICINE CLINIC | Age: 69
End: 2019-11-07

## 2019-11-07 VITALS
TEMPERATURE: 97.9 F | DIASTOLIC BLOOD PRESSURE: 73 MMHG | BODY MASS INDEX: 36.62 KG/M2 | SYSTOLIC BLOOD PRESSURE: 136 MMHG | RESPIRATION RATE: 18 BRPM | HEIGHT: 62 IN | HEART RATE: 87 BPM | OXYGEN SATURATION: 96 % | WEIGHT: 199 LBS

## 2019-11-07 DIAGNOSIS — M35.9 CONNECTIVE TISSUE DISORDER (HCC): ICD-10-CM

## 2019-11-07 DIAGNOSIS — M75.22 BICEPS TENDINITIS OF LEFT UPPER EXTREMITY: ICD-10-CM

## 2019-11-07 DIAGNOSIS — I10 ESSENTIAL HYPERTENSION: ICD-10-CM

## 2019-11-07 DIAGNOSIS — E11.9 TYPE 2 DIABETES MELLITUS WITHOUT COMPLICATION, WITHOUT LONG-TERM CURRENT USE OF INSULIN (HCC): ICD-10-CM

## 2019-11-07 DIAGNOSIS — E78.5 HYPERLIPIDEMIA, UNSPECIFIED HYPERLIPIDEMIA TYPE: ICD-10-CM

## 2019-11-07 DIAGNOSIS — E11.9 TYPE 2 DIABETES MELLITUS WITHOUT COMPLICATION, WITHOUT LONG-TERM CURRENT USE OF INSULIN (HCC): Primary | ICD-10-CM

## 2019-11-07 DIAGNOSIS — Z23 ENCOUNTER FOR IMMUNIZATION: ICD-10-CM

## 2019-11-07 LAB
ALBUMIN SERPL-MCNC: 3.7 G/DL (ref 3.4–5)
ALBUMIN/GLOB SERPL: 0.9 {RATIO} (ref 0.8–1.7)
ALP SERPL-CCNC: 56 U/L (ref 45–117)
ALT SERPL-CCNC: 24 U/L (ref 13–56)
ANION GAP SERPL CALC-SCNC: 5 MMOL/L (ref 3–18)
APPEARANCE UR: CLEAR
AST SERPL-CCNC: 13 U/L (ref 10–38)
BASOPHILS # BLD: 0 K/UL (ref 0–0.1)
BASOPHILS NFR BLD: 0 % (ref 0–2)
BILIRUB SERPL-MCNC: 0.4 MG/DL (ref 0.2–1)
BILIRUB UR QL: NEGATIVE
BUN SERPL-MCNC: 18 MG/DL (ref 7–18)
BUN/CREAT SERPL: 16 (ref 12–20)
CALCIUM SERPL-MCNC: 9.3 MG/DL (ref 8.5–10.1)
CHLORIDE SERPL-SCNC: 105 MMOL/L (ref 100–111)
CO2 SERPL-SCNC: 29 MMOL/L (ref 21–32)
COLOR UR: YELLOW
CREAT SERPL-MCNC: 1.1 MG/DL (ref 0.6–1.3)
DIFFERENTIAL METHOD BLD: ABNORMAL
EOSINOPHIL # BLD: 0.2 K/UL (ref 0–0.4)
EOSINOPHIL NFR BLD: 3 % (ref 0–5)
ERYTHROCYTE [DISTWIDTH] IN BLOOD BY AUTOMATED COUNT: 15.2 % (ref 11.6–14.5)
GLOBULIN SER CALC-MCNC: 3.9 G/DL (ref 2–4)
GLUCOSE SERPL-MCNC: 87 MG/DL (ref 74–99)
GLUCOSE UR STRIP.AUTO-MCNC: NEGATIVE MG/DL
HBA1C MFR BLD HPLC: 6.9 %
HCT VFR BLD AUTO: 37.8 % (ref 35–45)
HGB BLD-MCNC: 11.5 G/DL (ref 12–16)
HGB UR QL STRIP: NEGATIVE
KETONES UR QL STRIP.AUTO: NEGATIVE MG/DL
LEUKOCYTE ESTERASE UR QL STRIP.AUTO: NEGATIVE
LYMPHOCYTES # BLD: 1.8 K/UL (ref 0.9–3.6)
LYMPHOCYTES NFR BLD: 25 % (ref 21–52)
MCH RBC QN AUTO: 24.6 PG (ref 24–34)
MCHC RBC AUTO-ENTMCNC: 30.4 G/DL (ref 31–37)
MCV RBC AUTO: 80.9 FL (ref 74–97)
MONOCYTES # BLD: 0.5 K/UL (ref 0.05–1.2)
MONOCYTES NFR BLD: 8 % (ref 3–10)
NEUTS SEG # BLD: 4.5 K/UL (ref 1.8–8)
NEUTS SEG NFR BLD: 64 % (ref 40–73)
NITRITE UR QL STRIP.AUTO: NEGATIVE
PH UR STRIP: 6 [PH] (ref 5–8)
PLATELET # BLD AUTO: 312 K/UL (ref 135–420)
PMV BLD AUTO: 9.2 FL (ref 9.2–11.8)
POTASSIUM SERPL-SCNC: 4.3 MMOL/L (ref 3.5–5.5)
PROT SERPL-MCNC: 7.6 G/DL (ref 6.4–8.2)
PROT UR STRIP-MCNC: NEGATIVE MG/DL
RBC # BLD AUTO: 4.67 M/UL (ref 4.2–5.3)
SODIUM SERPL-SCNC: 139 MMOL/L (ref 136–145)
SP GR UR REFRACTOMETRY: 1.02 (ref 1–1.03)
TSH SERPL DL<=0.05 MIU/L-ACNC: 1.18 UIU/ML (ref 0.36–3.74)
UROBILINOGEN UR QL STRIP.AUTO: 0.2 EU/DL (ref 0.2–1)
WBC # BLD AUTO: 7 K/UL (ref 4.6–13.2)

## 2019-11-07 PROCEDURE — 82043 UR ALBUMIN QUANTITATIVE: CPT

## 2019-11-07 PROCEDURE — 36415 COLL VENOUS BLD VENIPUNCTURE: CPT

## 2019-11-07 PROCEDURE — 80061 LIPID PANEL: CPT

## 2019-11-07 PROCEDURE — 84443 ASSAY THYROID STIM HORMONE: CPT

## 2019-11-07 PROCEDURE — 80053 COMPREHEN METABOLIC PANEL: CPT

## 2019-11-07 PROCEDURE — 81003 URINALYSIS AUTO W/O SCOPE: CPT

## 2019-11-07 PROCEDURE — 85025 COMPLETE CBC W/AUTO DIFF WBC: CPT

## 2019-11-07 RX ORDER — GLIPIZIDE 5 MG/1
5 TABLET ORAL 2 TIMES DAILY
Qty: 180 TAB | Refills: 1 | Status: SHIPPED | OUTPATIENT
Start: 2019-11-07 | End: 2020-05-11 | Stop reason: SDUPTHER

## 2019-11-07 RX ORDER — BLOOD-GLUCOSE METER
EACH MISCELLANEOUS
Qty: 1 EACH | Refills: 0 | Status: SHIPPED | OUTPATIENT
Start: 2019-11-07

## 2019-11-07 NOTE — PROGRESS NOTES
History of Present Illness  Gina Reyes is a 81 y.o. female who presents today for management of    Chief Complaint   Patient presents with    Diabetes       Diabetes Mellitus:  She has diabetes mellitus, and  hypertension and hyperlipidemia. Diabetic ROS - medication compliance: compliant all of the time, diabetic diet compliance: compliant all of the time, home glucose monitoring: values are usually normal, had several episodes of hypoglycemia 50s, further diabetic ROS: no polyuria or polydipsia, no chest pain, dyspnea or TIA's, no numbness, tingling or pain in extremities. Lab review: orders written for new lab studies as appropriate; see orders.      Cardiovascular Review:  The patient has hypertension and hyperlipidemia. Diet and Lifestyle: generally follows a low fat low cholesterol diet, generally follows a low sodium diet, sedentary, nonsmoker  Home BP Monitoring: is not measured at home. Pertinent ROS: taking medications as instructed, no medication side effects noted, no TIA's, no chest pain on exertion, no dyspnea on exertion, no swelling of ankles. Patient complains of left shoulder and left arm pain for one month, intermittent, worse in the evening.     Problem List  Patient Active Problem List    Diagnosis Date Noted    Severe obesity (BMI 35.0-39.9) 06/05/2018    Hyperlipidemia 06/05/2018    Connective tissue disorder ([facility] 01/12/2018    ACP (advance care planning) 11/20/2017    History of breast cancer 11/20/2017    IBS (irritable bowel syndrome)     Diabetes mellitus, type 2 ([address]     HTN (hypertension) 04/05/2010       Past Medical History  Past Medical History:   Diagnosis Date    Angina pectoris     w/ silent ischemia    Breast cancer ([address]     Cancer ([address] 1987    breast    Chronic insomnia     Diabetes ([address]     Diabetes mellitus type II 2003    Exogenous obesity     HTN (hypertension) 4/5/2010    Hypertension     IBS (irritable bowel syndrome)     Menopause     SLE [facility]systemic lupus erythematosus) [facility])         Surgical History  Past Surgical History:   Procedure Laterality Date    EGD  02/20/09    HX BREAST BIOPSY  2003    right breast neg.  HX BREAST RECONSTRUCTION  1988    left breast    HX BREAST RECONSTRUCTION Left     HX BREAST REDUCTION Right 1987    HX CHOLECYSTECTOMY  1990    HX CHOLECYSTECTOMY      HX HEART CATHETERIZATION  11/28/03    left, normal    HX MASTECTOMY  1987    left breast    HX MASTECTOMY Left     [place] COLONOSCOPY FLX DX W/COLLJ SPEC WHEN PFRMD  2005 3/09    abby --egd capsule colon (NEG)        Current Medications  Current Outpatient Medications   Medication Sig    glipiZIDE (GLUCOTROL) 5 mg tablet Take 1 Tab by mouth two (2) times a day.  Blood-Glucose Meter (ONETOUCH VERIO FLEX) misc Use once daily    glucose blood VI test strips (ONETOUCH VERIO) strip Use once daily    hydroCHLOROthiazide (HYDRODIURIL) 12.5 mg tablet Take 1 Tab by mouth daily.  carvedilol (COREG) 6.25 mg tablet Take 1 Tab by mouth two (2) times a day.  ramipril (ALTACE) 10 mg capsule TAKE 1 CAPSULE BY MOUTH EVERY DAY    metFORMIN (GLUCOPHAGE) 500 mg tablet TAKE 1 TABLET BY MOUTH TWICE A DAY WITH MEALS    BYDUREON 2 mg/0.65 mL pnij TAKE 2 MG BY SUBCUTANEOUS ROUTE EVERY SEVEN (7) DAYS    Blood Pressure Monitor (BLOOD PRESSURE KIT) kit Use twice daily    ibuprofen (MOTRIN) 600 mg tablet Take 1 Tab by mouth every six (6) hours as needed.  hydroxychloroquine (PLAQUENIL) 200 mg tablet Take 200 mg by mouth daily.  aspirin delayed-release 81 mg tablet Take  by mouth daily.  colesevelam (WELCHOL) 625 mg tablet Take 1,875 mg by mouth two (2) times daily (with meals). No current facility-administered medications for this visit.         Allergies/Drug Reactions  Allergies   Allergen Reactions    Latex Hives    Atorvastatin Nausea Only    Metformin Diarrhea    Penicillins Hives        Family History  Family History   Problem Relation Age of Onset    Diabetes Mother     Heart Disease Father     Heart Attack Father     Breast Cancer Paternal Aunt     Diabetes Brother     Asthma Brother         Social History  Social History     Socioeconomic History    Marital status:      Spouse name: Not on file    Number of children: Not on file    Years of education: Not on file    Highest education level: Not on file   Occupational History    Not on file   Social Needs    Financial resource strain: Not on file    Food insecurity:     Worry: Not on file     Inability: Not on file    Transportation needs:     Medical: Not on file     Non-medical: Not on file   Tobacco Use    Smoking status: Never Smoker    Smokeless tobacco: Never Used   Substance and Sexual Activity    Alcohol use: No    Drug use: No    Sexual activity: Yes     Partners: Male   Lifestyle    Physical activity:     Days per week: Not on file     Minutes per session: Not on file    Stress: Not on file   Relationships    Social connections:     Talks on phone: Not on file     Gets together: Not on file     Attends [employer] service: Not on file     Active member of club or organization: Not on file     Attends meetings of clubs or organizations: Not on file     Relationship status: Not on file    Intimate partner violence:     Fear of current or ex partner: Not on file     Emotionally abused: Not on file     Physically abused: Not on file     Forced sexual activity: Not on file   Other Topics Concern    Not on file   Social History Narrative    ** Merged History Encounter **            Review of Systems  Negative except as mentioned in HPI      Physical Exam  Vital signs:   Vitals:    11/07/19 0906   BP: 136/73   Pulse: 87   Resp: 18   Temp: 97.9 °F (36.6 °C)   TempSrc: Oral   SpO2: 96%   Weight: 199 lb (90.3 kg)   Height: 5' 2\" (1.575 m)       General: alert, oriented, not in distress  Eyes: clear conjunctivae, anicteric sclerae, full and equal ROMs  Chest/Lungs: clear breath sounds, no wheezing or crackles  Heart: normal rate, regular rhythm, no murmur  Extremities: no focal deformities, no edema, (+) TTP over left biceps tendon  Neuro: AAOx3, CN's grossly intact  Skin: no visible abnormalities      Laboratory/Tests:    Component      Latest Ref Rng & Units 11/7/2019 6/5/2019           9:14 AM  8:28 AM   Sodium      136 - 145 mmol/L  139   Potassium      3.5 - 5.5 mmol/L  4.2   Chloride      100 - 108 mmol/L  102   CO2      21 - 32 mmol/L  30   Anion gap      3.0 - 18 mmol/L  7   Glucose      74 - 99 mg/dL  151 (H)   BUN      7.0 - 18 MG/DL  23 (H)   Creatinine      0.6 - 1.3 MG/DL  1.15   BUN/Creatinine ratio      12 - 20    20   GFR est AA      >60 ml/min/1.73m2  57 (L)   GFR est non-AA      >60 ml/min/1.73m2  47 (L)   Calcium      8.5 - 10.1 MG/DL  9.0   Hemoglobin A1c (POC)      % 6.9        Assessment/Plan:    1. Type 2 diabetes mellitus without complication, without long-term current use of insulin (HCC)  - controlled  - continue Bydureon, metformin and decrease glipizide  - AMB POC HEMOGLOBIN A1C  - glipiZIDE (GLUCOTROL) 5 mg tablet; Take 1 Tab by mouth two (2) times a day. Dispense: 180 Tab; Refill: 1  - Blood-Glucose Meter (ONETOUCH VERIO FLEX) misc; Use once daily  Dispense: 1 Each; Refill: 0  - glucose blood VI test strips (ONETOUCH VERIO) strip; Use once daily  Dispense: 200 Strip; Refill: 2  - CBC WITH AUTOMATED DIFF; Future  - LIPID PANEL; Future  - METABOLIC PANEL, COMPREHENSIVE; Future  - MICROALBUMIN, UR, RAND W/ MICROALB/CREAT RATIO; Future    2. Hyperlipidemia, unspecified hyperlipidemia type  - controlled  - LIPID PANEL; Future  - METABOLIC PANEL, COMPREHENSIVE; Future    3. Essential hypertension  - controlled  - METABOLIC PANEL, COMPREHENSIVE; Future  - TSH 3RD GENERATION; Future  - URINALYSIS W/ RFLX MICROSCOPIC; Future    4. Connective tissue disorder ([facility]  - stable  - continue Plaquenil  - rheum follow-up    5.  Encounter for immunization  - INFLUENZA VACCINE INACTIVATED (IIV), SUBUNIT, ADJUVANTED, IM  - ME IMMUNIZ ADMIN,1 SINGLE/COMB VAC/TOXOID    6. Biceps tendinitis, left  - NSAID as needed    Follow-up and Dispositions    · Return in about 3 months (around 2/7/2020) for DM, HTN, HLD. I have discussed the diagnosis with the patient and the intended plan as seen in the above orders. The patient has received an after-visit summary and questions were answered concerning future plans. I have discussed medication side effects and warnings with the patient as well. I have reviewed the plan of care with the patient, accepted their input and they are in agreement with the treatment goals.        Donny Garrison MD  November 7, 2019

## 2019-11-07 NOTE — PROGRESS NOTES
Dakota Ackerman is a 81 y.o. female who presents today for DM,      1. Have you been to the ER, urgent care clinic since your last visit? Hospitalized since your last visit? no    2. Have you seen or consulted any other health care providers outside of the [address] [place] [address] since your last visit? Include any pap smears or colon screening. no     Health Maintenance reviewed.     Health Maintenance Due   Topic Date Due    Shingrix Vaccine Age 49> (1 of 2) 10/29/2002    Influenza Age 5 to Adult  08/01/2019    LIPID PANEL Q1  09/05/2019    FOOT EXAM Q1  12/05/2019    HEMOGLOBIN A1C Q6M  12/05/2019    MICROALBUMIN Q1  12/05/2019

## 2019-11-08 LAB
CHOLEST SERPL-MCNC: 181 MG/DL
CREAT UR-MCNC: 102 MG/DL (ref 30–125)
HDLC SERPL-MCNC: 70 MG/DL (ref 40–60)
HDLC SERPL: 2.6 {RATIO} (ref 0–5)
LDLC SERPL CALC-MCNC: 89.2 MG/DL (ref 0–100)
LIPID PROFILE,FLP: ABNORMAL
MICROALBUMIN UR-MCNC: 0.52 MG/DL (ref 0–3)
MICROALBUMIN/CREAT UR-RTO: 5 MG/G (ref 0–30)
TRIGL SERPL-MCNC: 109 MG/DL (ref ?–150)
VLDLC SERPL CALC-MCNC: 21.8 MG/DL

## 2019-11-25 ENCOUNTER — TELEPHONE (OUTPATIENT)
Dept: FAMILY MEDICINE CLINIC | Age: 69
End: 2019-11-25

## 2019-11-25 DIAGNOSIS — E11.9 TYPE 2 DIABETES MELLITUS WITHOUT COMPLICATION, WITHOUT LONG-TERM CURRENT USE OF INSULIN (HCC): Primary | ICD-10-CM

## 2019-11-25 RX ORDER — LANCETS 33 GAUGE
EACH MISCELLANEOUS
Qty: 100 LANCET | Refills: 3 | Status: SHIPPED | OUTPATIENT
Start: 2019-11-25 | End: 2020-12-17

## 2020-02-03 ENCOUNTER — HOSPITAL ENCOUNTER (OUTPATIENT)
Dept: MAMMOGRAPHY | Age: 70
Discharge: HOME | End: 2020-02-03
Attending: INTERNAL MEDICINE
Payer: MEDICARE

## 2020-02-03 DIAGNOSIS — Z12.31 VISIT FOR SCREENING MAMMOGRAM: ICD-10-CM

## 2020-02-03 PROCEDURE — 77063 BREAST TOMOSYNTHESIS BI: CPT

## 2020-04-06 ENCOUNTER — TELEPHONE (OUTPATIENT)
Dept: FAMILY MEDICINE CLINIC | Age: 70
End: 2020-04-06

## 2020-04-06 DIAGNOSIS — E11.9 TYPE 2 DIABETES MELLITUS WITHOUT COMPLICATION, WITHOUT LONG-TERM CURRENT USE OF INSULIN (HCC): Primary | ICD-10-CM

## 2020-04-08 ENCOUNTER — HOSPITAL ENCOUNTER (OUTPATIENT)
Dept: LAB | Age: 70
Discharge: HOME | End: 2020-04-08
Payer: MEDICARE

## 2020-04-08 DIAGNOSIS — E11.9 TYPE 2 DIABETES MELLITUS WITHOUT COMPLICATION, WITHOUT LONG-TERM CURRENT USE OF INSULIN (HCC): ICD-10-CM

## 2020-04-08 LAB
ANION GAP SERPL CALC-SCNC: 9 MMOL/L (ref 3–18)
BUN SERPL-MCNC: 19 MG/DL (ref 7–18)
BUN/CREAT SERPL: 18 (ref 12–20)
CALCIUM SERPL-MCNC: 8.9 MG/DL (ref 8.5–10.1)
CHLORIDE SERPL-SCNC: 102 MMOL/L (ref 100–111)
CO2 SERPL-SCNC: 27 MMOL/L (ref 21–32)
CREAT SERPL-MCNC: 1.05 MG/DL (ref 0.6–1.3)
EST. AVERAGE GLUCOSE BLD GHB EST-MCNC: 166 MG/DL
GLUCOSE SERPL-MCNC: 178 MG/DL (ref 74–99)
HBA1C MFR BLD: 7.4 % (ref 4.2–5.6)
POTASSIUM SERPL-SCNC: 4.6 MMOL/L (ref 3.5–5.5)
SODIUM SERPL-SCNC: 138 MMOL/L (ref 136–145)

## 2020-04-08 PROCEDURE — 83036 HEMOGLOBIN GLYCOSYLATED A1C: CPT

## 2020-04-08 PROCEDURE — 36415 COLL VENOUS BLD VENIPUNCTURE: CPT

## 2020-04-08 PROCEDURE — 80048 BASIC METABOLIC PNL TOTAL CA: CPT

## 2020-04-13 ENCOUNTER — VIRTUAL VISIT (OUTPATIENT)
Dept: FAMILY MEDICINE CLINIC | Age: 70
End: 2020-04-13

## 2020-04-13 DIAGNOSIS — I10 ESSENTIAL HYPERTENSION: ICD-10-CM

## 2020-04-13 DIAGNOSIS — E11.9 TYPE 2 DIABETES MELLITUS WITHOUT COMPLICATION, WITHOUT LONG-TERM CURRENT USE OF INSULIN (HCC): Primary | ICD-10-CM

## 2020-04-13 DIAGNOSIS — E78.5 HYPERLIPIDEMIA, UNSPECIFIED HYPERLIPIDEMIA TYPE: ICD-10-CM

## 2020-04-13 DIAGNOSIS — M35.9 CONNECTIVE TISSUE DISORDER (HCC): ICD-10-CM

## 2020-04-13 RX ORDER — EXENATIDE 2 MG/.65ML
INJECTION, SUSPENSION, EXTENDED RELEASE SUBCUTANEOUS
Qty: 12 PEN | Refills: 3 | Status: SHIPPED | OUTPATIENT
Start: 2020-04-13 | End: 2020-10-15

## 2020-04-13 NOTE — PROGRESS NOTES
Jyotsna Patel is a 81 y.o. female who was seen by synchronous (real-time) audio-video technology using do[employer]. me on 4/13/2020. Location of the patient: Home    Location of the provider: [facility]    Consent:  She and/or health care decision maker is aware that that she may receive a bill for this telehealth service, depending on her insurance coverage, and has provided verbal consent to proceed: Yes    Subjective:   Jyotsna Patel is a 81 y.o. female who presents today for management of    Chief Complaint   Patient presents with    Blood sugar problem     higher than normal        Diabetes Mellitus:  She has diabetes mellitus, and  hypertension and hyperlipidemia. Diabetic ROS - medication compliance: Has not taken Bydureon in 2 months - ran out of refill, diabetic diet compliance: compliant all of the time, home glucose monitoring: fasting values range 140-150, further diabetic ROS: no polyuria or polydipsia, no chest pain, dyspnea or TIA's, no numbness, tingling or pain in extremities. Lab review: labs reviewed, I note that glycosylated hemoglobin increased from 6.9% to 7.4%. .     Problem List  Patient Active Problem List    Diagnosis Date Noted    Severe obesity (BMI 35.0-39.9) 06/05/2018    Hyperlipidemia 06/05/2018    Connective tissue disorder ([facility] 01/12/2018    ACP (advance care planning) 11/20/2017    History of breast cancer 11/20/2017    IBS (irritable bowel syndrome)     Diabetes mellitus, type 2 ([facility]     HTN (hypertension) 04/05/2010       Current Medications  Current Outpatient Medications   Medication Sig    lancets (ONETOUCH DELICA PLUS LANCET) 33 gauge misc Use once daily    glipiZIDE (GLUCOTROL) 5 mg tablet Take 1 Tab by mouth two (2) times a day.  Blood-Glucose Meter (ONETOUCH VERIO FLEX) misc Use once daily    glucose blood VI test strips (ONETOUCH VERIO) strip Use once daily    hydroCHLOROthiazide (HYDRODIURIL) 12.5 mg tablet Take 1 Tab by mouth daily.  carvedilol (COREG) 6.25 mg tablet Take 1 Tab by mouth two (2) times a day.  ramipril (ALTACE) 10 mg capsule TAKE 1 CAPSULE BY MOUTH EVERY DAY    metFORMIN (GLUCOPHAGE) 500 mg tablet TAKE 1 TABLET BY MOUTH TWICE A DAY WITH MEALS    Blood Pressure Monitor (BLOOD PRESSURE KIT) kit Use twice daily    ibuprofen (MOTRIN) 600 mg tablet Take 1 Tab by mouth every six (6) hours as needed.  hydroxychloroquine (PLAQUENIL) 200 mg tablet Take 200 mg by mouth daily.  aspirin delayed-release 81 mg tablet Take  by mouth daily.  colesevelam (WELCHOL) 625 mg tablet Take 1,875 mg by mouth two (2) times daily (with meals).  BYDUREON 2 mg/0.65 mL pnij TAKE 2 MG BY SUBCUTANEOUS ROUTE EVERY SEVEN (7) DAYS     No current facility-administered medications for this visit.         Allergies/Drug Reactions  Allergies   Allergen Reactions    Latex Hives    Atorvastatin Nausea Only    Metformin Diarrhea    Penicillins Hives        Social History  Social History     Socioeconomic History    Marital status:      Spouse name: Not on file    Number of children: Not on file    Years of education: Not on file    Highest education level: Not on file   Occupational History    Not on file   Social Needs    Financial resource strain: Not on file    Food insecurity     Worry: Not on file     Inability: Not on file    Transportation needs     Medical: Not on file     Non-medical: Not on file   Tobacco Use    Smoking status: Never Smoker    Smokeless tobacco: Never Used   Substance and Sexual Activity    Alcohol use: No    Drug use: No    Sexual activity: Yes     Partners: Male   Lifestyle    Physical activity     Days per week: Not on file     Minutes per session: Not on file    Stress: Not on file   Relationships    Social connections     Talks on phone: Not on file     Gets together: Not on file     Attends [employer] service: Not on file     Active member of club or organization: Not on file     Attends meetings of clubs or organizations: Not on file     Relationship status: Not on file    Intimate partner violence     Fear of current or ex partner: Not on file     Emotionally abused: Not on file     Physically abused: Not on file     Forced sexual activity: Not on file   Other Topics Concern    Not on file   Social History Narrative    ** Merged History Encounter **            Review of Systems  Review of Systems   Constitutional: Negative for chills, fever, malaise/fatigue and weight loss. Respiratory: Negative for shortness of breath. Cardiovascular: Negative for chest pain and palpitations. Gastrointestinal: Negative for abdominal pain, heartburn, nausea and vomiting. Genitourinary: Negative for dysuria and urgency. Musculoskeletal: Positive for joint pain. Neurological: Negative for dizziness and headaches. Objective:     General: alert, cooperative, no distress   Mental  status: mental status: alert, oriented to person, place, and time, normal mood, behavior, speech, dress, motor activity, and thought processes   Resp: resp: normal effort and no respiratory distress   Neuro: neuro: no gross deficits   Skin: skin: no discoloration or lesions of concern on visible areas     Due to this being a TeleHealth evaluation, many elements of the physical examination are unable to be assessed. Assessment & Plan:   1. Type 2 diabetes mellitus without complication, without long-term current use of insulin (HCC)  - needs better control  - restart exenatide microspheres (Bydureon) 2 mg/0.65 mL pnij; TAKE 2 MG BY SUBCUTANEOUS ROUTE EVERY SEVEN (7) DAYS  Dispense: 12 Pen; Refill: 3  - continue metformin  - d/c glipizide  - HEMOGLOBIN A1C WITH EAG; Future in 3 months  - METABOLIC PANEL, BASIC; Future    2. Essential hypertension  - controlled  - METABOLIC PANEL, BASIC; Future    3. Hyperlipidemia, unspecified hyperlipidemia type  - controlled    4.  [facility] ([facility]  - stable      Follow-up and Dispositions    · Return in about 3 months (around 7/13/2020) for DM, HTN, HLD. We discussed the expected course, resolution and complications of the diagnosis(es) in detail. Medication risks, benefits, costs, interactions, and alternatives were discussed as indicated. I advised her to contact the office if her condition worsens, changes or fails to improve as anticipated. She expressed understanding with the diagnosis(es) and plan. Pursuant to the emergency declaration under the [address] [place] [address], [facility] waiver authority and the [employer] Act, this Virtual  Visit was conducted, with patient's consent, to reduce the patient's risk of exposure to COVID-19 and provide continuity of care for an established patient. Services were provided through a video synchronous discussion virtually to substitute for in-person clinic visit.     Josefina Pearson MD

## 2020-04-13 NOTE — PROGRESS NOTES
Rome Dunham presents today for   Chief Complaint   Patient presents with    Blood sugar problem     higher than normal        Patient presents for Telehealth Medicine via [employer]. Me    Depression Screening:  3 most recent PHQ Screens 3/5/2019   Little interest or pleasure in doing things Not at all   Feeling down, depressed, irritable, or hopeless Not at all   Total Score PHQ 2 0       Learning Assessment:  Learning Assessment 6/5/2019   PRIMARY LEARNER Patient   HIGHEST LEVEL OF EDUCATION - PRIMARY LEARNER  GRADUATED HIGH SCHOOL OR GED   BARRIERS PRIMARY LEARNER NONE   CO-LEARNER CAREGIVER No   PRIMARY LANGUAGE ENGLISH   LEARNER PREFERENCE PRIMARY LISTENING   ANSWERED BY patient   RELATIONSHIP SELF       Abuse Screening:  Abuse Screening Questionnaire 6/5/2019   Do you ever feel afraid of your partner? N   Are you in a relationship with someone who physically or mentally threatens you? N   Is it safe for you to go home? Y       Fall Risk  Fall Risk Assessment, last 12 mths 3/5/2019   Able to walk? Yes   Fall in past 12 months? No       Health Maintenance reviewed and discussed and ordered per Provider. Health Maintenance Due   Topic Date Due    Shingrix Vaccine Age 49> (1 of 2) 10/29/2002    Foot Exam Q1  12/05/2019    Medicare Yearly Exam  03/05/2020    DTaP/Tdap/Td series (2 - Td) 04/05/2020   . Coordination of Care:  1. Have you been to the ER, urgent care clinic since your last visit? Hospitalized since your last visit? no    2. Have you seen or consulted any other health care providers outside of the [address] [place] [address] since your last visit? Include any pap smears or colon screening. no          Consent:  She and/or health care decision maker is aware that that she may receive a bill for this telephone service, depending on her insurance coverage, and has provided verbal consent to proceed:  Yes

## 2020-05-11 DIAGNOSIS — E11.9 TYPE 2 DIABETES MELLITUS WITHOUT COMPLICATION, WITHOUT LONG-TERM CURRENT USE OF INSULIN (HCC): ICD-10-CM

## 2020-05-11 RX ORDER — GLIPIZIDE 5 MG/1
5 TABLET ORAL 2 TIMES DAILY
Qty: 180 TAB | Refills: 1 | Status: SHIPPED | OUTPATIENT
Start: 2020-05-11 | End: 2020-07-15

## 2020-06-04 DIAGNOSIS — E11.9 TYPE 2 DIABETES MELLITUS WITHOUT COMPLICATION, WITHOUT LONG-TERM CURRENT USE OF INSULIN (HCC): ICD-10-CM

## 2020-06-04 RX ORDER — METFORMIN HYDROCHLORIDE 500 MG/1
TABLET ORAL
Qty: 180 TAB | Refills: 3 | Status: SHIPPED | OUTPATIENT
Start: 2020-06-04 | End: 2020-07-15 | Stop reason: SDUPTHER

## 2020-07-13 RX ORDER — RAMIPRIL 10 MG/1
CAPSULE ORAL
Qty: 90 CAP | Refills: 2 | Status: SHIPPED | OUTPATIENT
Start: 2020-07-13 | End: 2021-03-02

## 2020-07-15 ENCOUNTER — OFFICE VISIT (OUTPATIENT)
Dept: FAMILY MEDICINE CLINIC | Age: 70
End: 2020-07-15

## 2020-07-15 VITALS
SYSTOLIC BLOOD PRESSURE: 121 MMHG | RESPIRATION RATE: 15 BRPM | HEIGHT: 62 IN | DIASTOLIC BLOOD PRESSURE: 72 MMHG | HEART RATE: 80 BPM | OXYGEN SATURATION: 99 % | BODY MASS INDEX: 33.86 KG/M2 | TEMPERATURE: 97.4 F | WEIGHT: 184 LBS

## 2020-07-15 DIAGNOSIS — E78.5 HYPERLIPIDEMIA, UNSPECIFIED HYPERLIPIDEMIA TYPE: ICD-10-CM

## 2020-07-15 DIAGNOSIS — I10 ESSENTIAL HYPERTENSION: ICD-10-CM

## 2020-07-15 DIAGNOSIS — Z13.39 SCREENING FOR ALCOHOLISM: ICD-10-CM

## 2020-07-15 DIAGNOSIS — Z13.31 SCREENING FOR DEPRESSION: ICD-10-CM

## 2020-07-15 DIAGNOSIS — Z00.00 MEDICARE ANNUAL WELLNESS VISIT, SUBSEQUENT: ICD-10-CM

## 2020-07-15 DIAGNOSIS — E11.9 TYPE 2 DIABETES MELLITUS WITHOUT COMPLICATION, WITHOUT LONG-TERM CURRENT USE OF INSULIN (HCC): Primary | ICD-10-CM

## 2020-07-15 PROBLEM — E11.21 TYPE 2 DIABETES WITH NEPHROPATHY (HCC): Status: ACTIVE | Noted: 2020-07-15

## 2020-07-15 LAB — HBA1C MFR BLD HPLC: 7.1 %

## 2020-07-15 RX ORDER — METFORMIN HYDROCHLORIDE 1000 MG/1
1000 TABLET ORAL 2 TIMES DAILY WITH MEALS
Qty: 180 TAB | Refills: 0 | Status: SHIPPED | OUTPATIENT
Start: 2020-07-15 | End: 2020-10-15 | Stop reason: SDUPTHER

## 2020-07-15 NOTE — PROGRESS NOTES
History of Present Illness  Nancy Barajas is a 81 y.o. female who presents today for management of    Chief Complaint   Patient presents with    Annual Wellness Visit    Diabetes    Cholesterol Problem    Hypertension       Diabetes Mellitus:  She has diabetes mellitus, and  hypertension and hyperlipidemia. Diabetic ROS - medication compliance: compliant all of the time, diabetic diet compliance: compliant most of the time, home glucose monitoring: values are usually normal, further diabetic ROS: no polyuria or polydipsia, no chest pain, dyspnea or TIA's, no numbness, tingling or pain in extremities. Lab review: labs reviewed, I note that glycosylated hemoglobin mildly abnormal but acceptable, 7.1%, lipids LDL result meets goal.     Problem List  Patient Active Problem List    Diagnosis Date Noted    Type 2 diabetes with nephropathy ([facility] 07/15/2020    Severe obesity (BMI 35.0-39.9) 06/05/2018    Hyperlipidemia 06/05/2018    Connective tissue disorder ([facility] 01/12/2018    ACP (advance care planning) 11/20/2017    History of breast cancer 11/20/2017    IBS (irritable bowel syndrome)     Diabetes mellitus, type 2 ([facility]     HTN (hypertension) 04/05/2010       Current Medications  Current Outpatient Medications   Medication Sig    metFORMIN (GLUCOPHAGE) 1,000 mg tablet Take 1 Tab by mouth two (2) times daily (with meals).  ramipriL (ALTACE) 10 mg capsule TAKE 1 CAPSULE BY MOUTH EVERY DAY    exenatide microspheres (Bydureon) 2 mg/0.65 mL pnij TAKE 2 MG BY SUBCUTANEOUS ROUTE EVERY SEVEN (7) DAYS    lancets (ONETOUCH DELICA PLUS LANCET) 33 gauge misc Use once daily    Blood-Glucose Meter (ONETOUCH VERIO FLEX) misc Use once daily    glucose blood VI test strips (ONETOUCH VERIO) strip Use once daily    hydroCHLOROthiazide (HYDRODIURIL) 12.5 mg tablet Take 1 Tab by mouth daily.  carvedilol (COREG) 6.25 mg tablet Take 1 Tab by mouth two (2) times a day.     Blood Pressure Monitor (BLOOD PRESSURE KIT) kit Use twice daily    ibuprofen (MOTRIN) 600 mg tablet Take 1 Tab by mouth every six (6) hours as needed.  hydroxychloroquine (PLAQUENIL) 200 mg tablet Take 200 mg by mouth daily.  aspirin delayed-release 81 mg tablet Take  by mouth daily.  colesevelam (WELCHOL) 625 mg tablet Take 1,875 mg by mouth two (2) times daily (with meals). No current facility-administered medications for this visit. Allergies/Drug Reactions  Allergies   Allergen Reactions    Latex Hives    Atorvastatin Nausea Only    Metformin Diarrhea    Penicillins Hives        Review of Systems  Review of Systems   Constitutional: Negative. Respiratory: Negative. Cardiovascular: Negative. Gastrointestinal: Negative. Genitourinary: Negative. Musculoskeletal: Negative. Neurological: Negative. Psychiatric/Behavioral: Negative.          Physical Exam  Vital signs:   Vitals:    07/15/20 1013   BP: 121/72   Pulse: 80   Resp: 15   Temp: 97.4 °F (36.3 °C)   TempSrc: Oral   SpO2: 99%   Weight: 184 lb (83.5 kg)   Height: 5' 2\" (1.575 m)       General: alert, oriented, not in distress  Head: scalp normal, atraumatic  Eyes: pupils are equal and reactive, full and intact EOM's  Neck: supple, no JVD, no lymphadenopathy, non-palpable thyroid  Chest/Lungs: clear breath sounds, no wheezing or crackles  Heart: normal rate, regular rhythm, no murmur  Extremities: no focal deformities, no edema  Skin: no active skin lesions    Laboratory/Tests:  Component      Latest Ref Rng & Units 4/8/2020 11/7/2019           8:27 AM  9:36 AM   Sodium      136 - 145 mmol/L 138    Potassium      3.5 - 5.5 mmol/L 4.6    Chloride      100 - 111 mmol/L 102    CO2      21 - 32 mmol/L 27    Anion gap      3.0 - 18 mmol/L 9    Glucose      74 - 99 mg/dL 178 (H)    BUN      7.0 - 18 MG/DL 19 (H)    Creatinine      0.6 - 1.3 MG/DL 1.05    BUN/Creatinine ratio      12 - 20   18    GFR est AA      >60 ml/min/1.73m2 >60    GFR est non-AA      >60 ml/min/1.73m2 52 (L)    Calcium      8.5 - 10.1 MG/DL 8.9    Cholesterol, total      <200 MG/DL  181   Triglyceride      <150 MG/DL  109   HDL Cholesterol      40 - 60 MG/DL  70 (H)   LDL, calculated      0 - 100 MG/DL  89.2   VLDL, calculated      MG/DL  21.8   CHOL/HDL Ratio      0 - 5.0    2.6       Assessment/Plan:      1. Type 2 diabetes mellitus without complication, without long-term current use of insulin (HCC)  - controlled  - AMB POC HEMOGLOBIN A1C  - increase metformin to 1000mg BID  - d/c glipizide   - may need to d/c Bydureon due to cost $600    2. Hyperlipidemia, unspecified hyperlipidemia type  - controlled     3. Essential hypertension  - controlled    Follow-up and Dispositions    · Return in about 3 months (around 10/15/2020) for DM, HTN, HLD. I have discussed the diagnosis with the patient and the intended plan as seen in the above orders. The patient has received an after-visit summary and questions were answered concerning future plans. I have discussed medication side effects and warnings with the patient as well. I have reviewed the plan of care with the patient, accepted their input and they are in agreement with the treatment goals. Milena Mahoney MD  July 15, 2020       This is the Subsequent Medicare Annual Wellness Exam, performed 12 months or more after the Initial AWV or the last Subsequent AWV    I have reviewed the patient's medical history in detail and updated the computerized patient record. History     Patient Active Problem List   Diagnosis Code    HTN (hypertension) I10    Diabetes mellitus, type 2 (HCC) E11.9    IBS (irritable bowel syndrome) K58.9    ACP (advance care planning) Z71.89    History of breast cancer Z85.3    Connective tissue disorder ([facility] M35.9    Severe obesity (BMI 35.0-39. 9) E66.01    Hyperlipidemia E78.5    Type 2 diabetes with nephropathy ([facility] E11.21     Past Medical History:   Diagnosis Date    Angina pectoris     w/ silent ischemia    Breast cancer ([facility]     Cancer ([facility] 1987    breast    Chronic insomnia     Diabetes ([facility]     Diabetes mellitus type II 2003    Exogenous obesity     HTN (hypertension) 4/5/2010    Hypertension     IBS (irritable bowel syndrome)     Menopause     SLE (systemic lupus erythematosus) ([facility]       Past Surgical History:   Procedure Laterality Date    EGD  02/20/09    HX BREAST BIOPSY  2003    right breast neg.  HX BREAST RECONSTRUCTION  1988    left breast    HX BREAST RECONSTRUCTION Left     HX BREAST REDUCTION Right 1987    HX CHOLECYSTECTOMY  1990    HX CHOLECYSTECTOMY      HX HEART CATHETERIZATION  11/28/03    left, normal    HX MASTECTOMY  1987    left breast    HX MASTECTOMY Left     IL COLONOSCOPY FLX DX W/COLLJ SPEC WHEN PFRMD  2005 3/09    abby --egd capsule colon (NEG)     Current Outpatient Medications   Medication Sig Dispense Refill    metFORMIN (GLUCOPHAGE) 1,000 mg tablet Take 1 Tab by mouth two (2) times daily (with meals). 180 Tab 0    ramipriL (ALTACE) 10 mg capsule TAKE 1 CAPSULE BY MOUTH EVERY DAY 90 Cap 2    exenatide microspheres (Bydureon) 2 mg/0.65 mL pnij TAKE 2 MG BY SUBCUTANEOUS ROUTE EVERY SEVEN (7) DAYS 12 Pen 3    lancets (ONETOUCH DELICA PLUS LANCET) 33 gauge misc Use once daily 100 Lancet 3    Blood-Glucose Meter (ONETOUCH VERIO FLEX) misc Use once daily 1 Each 0    glucose blood VI test strips (ONETOUCH VERIO) strip Use once daily 200 Strip 2    hydroCHLOROthiazide (HYDRODIURIL) 12.5 mg tablet Take 1 Tab by mouth daily. 90 Tab 3    carvedilol (COREG) 6.25 mg tablet Take 1 Tab by mouth two (2) times a day. 60 Tab 11    Blood Pressure Monitor (BLOOD PRESSURE KIT) kit Use twice daily 1 Kit 0    ibuprofen (MOTRIN) 600 mg tablet Take 1 Tab by mouth every six (6) hours as needed. 1    hydroxychloroquine (PLAQUENIL) 200 mg tablet Take 200 mg by mouth daily.  aspirin delayed-release 81 mg tablet Take  by mouth daily.       [facility]) 625 mg tablet Take 1,875 mg by mouth two (2) times daily (with meals). Allergies   Allergen Reactions    Latex Hives    Atorvastatin Nausea Only    Metformin Diarrhea    Penicillins Hives       Family History   Problem Relation Age of Onset    Diabetes Mother     Heart Disease Father     Heart Attack Father     Breast Cancer Paternal Aunt     Diabetes Brother     Asthma Brother      Social History     Tobacco Use    Smoking status: Never Smoker    Smokeless tobacco: Never Used   Substance Use Topics    Alcohol use: No       Depression Risk Factor Screening:     3 most recent PHQ Screens 7/15/2020   Little interest or pleasure in doing things Not at all   Feeling down, depressed, irritable, or hopeless Not at all   Total Score PHQ 2 0       Alcohol Risk Factor Screening:   Do you average 1 drink per night or more than 7 drinks a week:  No    On any one occasion in the past three months have you have had more than 3 drinks containing alcohol:  No      Functional Ability and Level of Safety:   Hearing: Hearing is good. Activities of Daily Living: The home contains: no safety equipment. Patient does total self care     Ambulation: with no difficulty     Fall Risk:  Fall Risk Assessment, last 12 mths 3/5/2019   Able to walk? Yes   Fall in past 12 months?  No     Abuse Screen:  Patient is not abused       Cognitive Screening   Has your family/caregiver stated any concerns about your memory: no     Cognitive Screening: Normal - Verbal Fluency Test    Patient Care Team   Patient Care Team:  Augusto Mcguire MD as PCP - General (Internal Medicine)  Augusto Mcguire [facility] PCP [facility]  Jamia Oliver MD (Internal Medicine)  Gislela Ling MD as Physician (Rheumatology)  Ирина Foreman MD as Physician (Ophthalmology)  Yamilka Vital MD (Gastroenterology)    Assessment/Plan   Education and counseling provided:  Are appropriate based on today's review and evaluation  Diabetes outpatient self-management training services    Diagnoses and all orders for this visit:    1. Type 2 diabetes mellitus without complication, without long-term current use of insulin (HCC)  -     AMB POC HEMOGLOBIN A1C  -     metFORMIN (GLUCOPHAGE) 1,000 mg tablet; Take 1 Tab by mouth two (2) times daily (with meals). 2. Hyperlipidemia, unspecified hyperlipidemia type    3. Essential hypertension    4. Medicare annual wellness visit, subsequent    5.  Screening for alcoholism  -     [place] ANNUAL ALCOHOL SCREEN 15 MIN    6. Screening for depression  -     [address] Maintenance Due   Topic Date Due    Shingrix Vaccine Age 50> (1 of 2) 10/29/2002    Foot Exam Q1  12/05/2019    Medicare Yearly Exam  03/05/2020    DTaP/Tdap/Td series (2 - Td) 04/05/2020    Eye Exam Retinal or Dilated  06/15/2020

## 2020-07-15 NOTE — PROGRESS NOTES
Tucker Samsoni presents today for medicare wellness   - Is someone accompanying this pt? no  - Is the patient using any durable medical equipment during office visit? no    Coordination of Care:  1. Have you been to the ER, urgent care clinic since your last visit? Hospitalized since your last visit? no    2. Have you seen or consulted any other health care providers outside of the [address] [place] [address] since your last visit? Include any pap smears or colon screening.  No

## 2020-07-15 NOTE — PATIENT INSTRUCTIONS
Medicare Wellness Visit, Female     The best way to live healthy is to have a lifestyle where you eat a well-balanced diet, exercise regularly, limit alcohol use, and quit all forms of tobacco/nicotine, if applicable. Regular preventive services are another way to keep healthy. Preventive services (vaccines, screening tests, monitoring & exams) can help personalize your care plan, which helps you manage your own care. Screening tests can find health problems at the earliest stages, when they are easiest to treat. [facility] follows the current, evidence-based guidelines published by the [facility] ([facility]) when recommending preventive services for our patients. Because we follow these guidelines, sometimes recommendations change over time as research supports it. (For example, mammograms used to be recommended annually. Even though Medicare will still pay for an annual mammogram, the newer guidelines recommend a mammogram every two years for women of average risk). Of course, you and your doctor may decide to screen more often for some diseases, based on your risk and your co-morbidities (chronic disease you are already diagnosed with). Preventive services for you include:  - Medicare offers their members a free annual wellness visit, which is time for you and your primary care provider to discuss and plan for your preventive service needs. Take advantage of this benefit every year!  -All adults over the age of 72 should receive the recommended pneumonia vaccines. Current USPSTF guidelines recommend a series of two vaccines for the best pneumonia protection.   -All adults should have a flu vaccine yearly and a tetanus vaccine every 10 years.   -All adults age 48 and older should receive the shingles vaccines (series of two vaccines).       -All adults age 38-68 who are overweight should have a diabetes screening test once every three years.   -All adults born between [birth year] and 1965 should be screened once for Hepatitis C.  -Other screening tests and preventive services for persons with diabetes include: an eye exam to screen for diabetic retinopathy, a kidney function test, a foot exam, and stricter control over your cholesterol.   -Cardiovascular screening for adults with routine risk involves an electrocardiogram (ECG) at intervals determined by your doctor.   -Colorectal cancer screenings should be done for adults age 54-65 with no increased risk factors for colorectal cancer. There are a number of acceptable methods of screening for this type of cancer. Each test has its own benefits and drawbacks. Discuss with your doctor what is most appropriate for you during your annual wellness visit. The different tests include: colonoscopy (considered the best screening method), a fecal occult blood test, a fecal DNA test, and sigmoidoscopy.    -A bone mass density test is recommended when a woman turns 65 to screen for osteoporosis. This test is only recommended one time, as a screening. Some providers will use this same test as a disease monitoring tool if you already have osteoporosis. -Breast cancer screenings are recommended every other year for women of normal risk, age 54-69.  -Cervical cancer screenings for women over age 72 are only recommended with certain risk factors.      Here is a list of your current Health Maintenance items (your personalized list of preventive services) with a due date:  Health Maintenance Due   Topic Date Due    Shingles Vaccine (1 of 2) 10/29/2002    Diabetic Foot Care  12/05/2019    Annual Well Visit  03/05/2020    DTaP/Tdap/Td  (2 - Td) 04/05/2020    Eye Exam  06/15/2020

## 2020-08-11 ENCOUNTER — TELEPHONE (OUTPATIENT)
Dept: FAMILY MEDICINE CLINIC | Age: 70
End: 2020-08-11

## 2020-08-11 DIAGNOSIS — E11.9 TYPE 2 DIABETES MELLITUS WITHOUT COMPLICATION, WITHOUT LONG-TERM CURRENT USE OF INSULIN (HCC): Primary | ICD-10-CM

## 2020-08-11 RX ORDER — GLIPIZIDE 5 MG/1
5 TABLET ORAL 2 TIMES DAILY
Qty: 180 TAB | Refills: 0 | Status: SHIPPED | OUTPATIENT
Start: 2020-08-11 | End: 2020-10-21

## 2020-08-11 NOTE — TELEPHONE ENCOUNTER
Pt called in and informed me that Dr. Jose Enrique Yeager had prescribed her Bydureon and her insurance would not cover it so she called her insurance to see what they would cover and she found out that they would cover the glipizide that she was previously on. So she was wanting to know if she could take the glipizide with the metformin. She needs a call back from the nurse in regards to this. Please advise.

## 2020-09-16 ENCOUNTER — TELEPHONE (OUTPATIENT)
Dept: PHARMACY | Age: 70
End: 2020-09-16

## 2020-09-16 NOTE — TELEPHONE ENCOUNTER
Peace Alvarado MD - would patient benefit from statin therapy? Please consider adding rosuvastatin 5 mg daily to your patient's regimen for the following reason:   ADA Guidelines:    >/= 36years old:   o No history of ASCVD or 10-year ASCVD risk < 20% - moderate-intensity statin is recommended. Patient's most recent ALT is 24 U/L. Note patient was on atorvastatin 10 mg May-June 2018. Per chart, stopped due to nausea. Labs/follow-up per provider discretion. I can contact patient/family to discuss any changes in therapy. Future Appointments   Date Time Provider Nohelia Cui   10/15/2020  9:15 AM Peace Alvarado MD [facility] KATE LANDRUM     Thank you,  Kelsy Jimenez, PharmD, [facility], [address] [facility] Clinical Pharmacist  O: [phone]  Department, toll free: [phone], option 7 ========================================================  CLINICAL PHARMACY: STATIN REVIEW    SUBJECTIVE:   Identified as DM care gap for [employer]: statin therapy. OBJECTIVE:  Allergies   Allergen Reactions    Latex Hives    Atorvastatin Nausea Only    Metformin Diarrhea    Penicillins Hives       Medications per current medication list:  Current Outpatient Medications   Medication Sig Dispense Refill    glipiZIDE (GLUCOTROL) 5 mg tablet Take 1 Tab by mouth two (2) times a day. 180 Tab 0    metFORMIN (GLUCOPHAGE) 1,000 mg tablet Take 1 Tab by mouth two (2) times daily (with meals).  180 Tab 0    ramipriL (ALTACE) 10 mg capsule TAKE 1 CAPSULE BY MOUTH EVERY DAY 90 Cap 2    exenatide microspheres (Bydureon) 2 mg/0.65 mL pnij TAKE 2 MG BY SUBCUTANEOUS ROUTE EVERY SEVEN (7) DAYS 12 Pen 3    lancets (ONETOUCH DELICA PLUS LANCET) 33 gauge misc Use once daily 100 Lancet 3    Blood-Glucose Meter (ONETOUCH VERIO FLEX) misc Use once daily 1 Each 0    glucose blood VI test strips (ONETOUCH VERIO) strip Use once daily 200 Strip 2    hydroCHLOROthiazide (HYDRODIURIL) 12.5 mg tablet Take 1 Tab by mouth daily. 90 Tab 3    carvedilol (COREG) 6.25 mg tablet Take 1 Tab by mouth two (2) times a day. 60 Tab 11    Blood Pressure Monitor (BLOOD PRESSURE KIT) kit Use twice daily 1 Kit 0    ibuprofen (MOTRIN) 600 mg tablet Take 1 Tab by mouth every six (6) hours as needed. 1    hydroxychloroquine (PLAQUENIL) 200 mg tablet Take 200 mg by mouth daily.  aspirin delayed-release 81 mg tablet Take  by mouth daily.  colesevelam (WELCHOL) 625 mg tablet Take 1,875 mg by mouth two (2) times daily (with meals). Labs:  Lab Results   Component Value Date/Time    Cholesterol, total 181 11/07/2019 09:36 AM    HDL Cholesterol 70 (H) 11/07/2019 09:36 AM    LDL, calculated 89.2 11/07/2019 09:36 AM    VLDL, calculated 21.8 11/07/2019 09:36 AM    Triglyceride 109 11/07/2019 09:36 AM    CHOL/HDL Ratio 2.6 11/07/2019 09:36 AM     ALT (SGPT)   Date Value Ref Range Status   11/07/2019 24 13 - 56 U/L Final     AST (SGOT)   Date Value Ref Range Status   11/07/2019 13 10 - 38 U/L Final       ASCVD risk:  18.3%    BP Readings from Last 1 Encounters:   07/15/20 121/72       Social History     Tobacco Use    Smoking status: Never Smoker    Smokeless tobacco: Never Used   Substance Use Topics    Alcohol use: No         ASSESSMENT:  Hyperlipidemia Goal: Patient is not prescribed moderate-intensity statin therapy. ADA Guidelines:    >/= 36years old:   o No history of ASCVD or 10-year ASCVD risk < 20% - moderate-intensity statin is recommended. Patient's most recent ALT is 24 U/L. Note patient was on atorvastatin 10 mg May-June 2018. Per chart, stopped due to nausea. PLAN:  Please consider adding rosuvastatin 5 mg daily to your patient's regimen for the following reason:   ADA Guidelines:    >/= 36years old:   o No history of ASCVD or 10-year ASCVD risk < 20% - moderate-intensity statin is recommended. Patient's most recent ALT is 24 U/L.      Thank you,    Livia Puente, PharmD, [address] [place] [address], [address] [place], [place] [address] [address] [place] [address],[address] Clinical Pharmacist  O: [phone]  Department, toll free: [phone], option 7

## 2020-10-15 ENCOUNTER — OFFICE VISIT (OUTPATIENT)
Dept: FAMILY MEDICINE CLINIC | Age: 70
End: 2020-10-15
Payer: MEDICARE

## 2020-10-15 VITALS
TEMPERATURE: 97.7 F | HEART RATE: 77 BPM | OXYGEN SATURATION: 100 % | SYSTOLIC BLOOD PRESSURE: 118 MMHG | RESPIRATION RATE: 15 BRPM | BODY MASS INDEX: 36.91 KG/M2 | DIASTOLIC BLOOD PRESSURE: 71 MMHG | HEIGHT: 62 IN | WEIGHT: 200.6 LBS

## 2020-10-15 DIAGNOSIS — E11.9 TYPE 2 DIABETES MELLITUS WITHOUT COMPLICATION, WITHOUT LONG-TERM CURRENT USE OF INSULIN (HCC): Primary | ICD-10-CM

## 2020-10-15 DIAGNOSIS — E11.9 COMPREHENSIVE DIABETIC FOOT EXAMINATION, TYPE 2 DM, ENCOUNTER FOR (HCC): ICD-10-CM

## 2020-10-15 DIAGNOSIS — I10 ESSENTIAL HYPERTENSION: ICD-10-CM

## 2020-10-15 DIAGNOSIS — E78.5 HYPERLIPIDEMIA, UNSPECIFIED HYPERLIPIDEMIA TYPE: ICD-10-CM

## 2020-10-15 LAB — HBA1C MFR BLD HPLC: 6.5 %

## 2020-10-15 PROCEDURE — G9899 SCRN MAM PERF RSLTS DOC: HCPCS | Performed by: INTERNAL MEDICINE

## 2020-10-15 PROCEDURE — G8417 CALC BMI ABV UP PARAM F/U: HCPCS | Performed by: INTERNAL MEDICINE

## 2020-10-15 PROCEDURE — G8752 SYS BP LESS 140: HCPCS | Performed by: INTERNAL MEDICINE

## 2020-10-15 PROCEDURE — 3017F COLORECTAL CA SCREEN DOC REV: CPT | Performed by: INTERNAL MEDICINE

## 2020-10-15 PROCEDURE — 2022F DILAT RTA XM EVC RTNOPTHY: CPT | Performed by: INTERNAL MEDICINE

## 2020-10-15 PROCEDURE — 99214 OFFICE O/P EST MOD 30 MIN: CPT | Performed by: INTERNAL MEDICINE

## 2020-10-15 PROCEDURE — 83036 HEMOGLOBIN GLYCOSYLATED A1C: CPT | Performed by: INTERNAL MEDICINE

## 2020-10-15 PROCEDURE — 3051F HG A1C>EQUAL 7.0%<8.0%: CPT | Performed by: INTERNAL MEDICINE

## 2020-10-15 PROCEDURE — G8754 DIAS BP LESS 90: HCPCS | Performed by: INTERNAL MEDICINE

## 2020-10-15 PROCEDURE — G8536 NO DOC ELDER MAL SCRN: HCPCS | Performed by: INTERNAL MEDICINE

## 2020-10-15 PROCEDURE — 1101F PT FALLS ASSESS-DOCD LE1/YR: CPT | Performed by: INTERNAL MEDICINE

## 2020-10-15 PROCEDURE — 1090F PRES/ABSN URINE INCON ASSESS: CPT | Performed by: INTERNAL MEDICINE

## 2020-10-15 PROCEDURE — G8432 DEP SCR NOT DOC, RNG: HCPCS | Performed by: INTERNAL MEDICINE

## 2020-10-15 PROCEDURE — G8427 DOCREV CUR MEDS BY ELIG CLIN: HCPCS | Performed by: INTERNAL MEDICINE

## 2020-10-15 PROCEDURE — G8399 PT W/DXA RESULTS DOCUMENT: HCPCS | Performed by: INTERNAL MEDICINE

## 2020-10-15 RX ORDER — HYDROCHLOROTHIAZIDE 12.5 MG/1
12.5 TABLET ORAL DAILY
Qty: 90 TAB | Refills: 3 | Status: SHIPPED | OUTPATIENT
Start: 2020-10-15 | End: 2021-10-07

## 2020-10-15 RX ORDER — METFORMIN HYDROCHLORIDE 1000 MG/1
1000 TABLET ORAL 2 TIMES DAILY WITH MEALS
Qty: 180 TAB | Refills: 3 | Status: SHIPPED | OUTPATIENT
Start: 2020-10-15 | End: 2021-10-13

## 2020-10-15 RX ORDER — CARVEDILOL 6.25 MG/1
6.25 TABLET ORAL 2 TIMES DAILY
Qty: 180 TAB | Refills: 3 | Status: SHIPPED | OUTPATIENT
Start: 2020-10-15 | End: 2021-10-07

## 2020-10-15 NOTE — PROGRESS NOTES
History of Present Illness  Bruna Toscano is a 81 y.o. female who presents today for management of    Chief Complaint   Patient presents with    Diabetes    Hypertension     Diabetes Mellitus:  The patient has diabetes, hypertension and hyperlipidemia. Diabetic ROS - medication compliance: compliant all of the time, diabetic diet compliance: compliant most of the time, home glucose monitorinfasting values range 7.-120   Lab review: orders written for new lab studies as appropriate; see orders. Cardiovascular Review:  Diet and Lifestyle: generally follows a low fat low cholesterol diet, generally follows a low sodium diet, exercises sporadically, nonsmoker  Home BP Monitoring: is not measured at home. Pertinent ROS: taking medications as instructed, no medication side effects noted, no TIA's, no chest pain on exertion, no dyspnea on exertion, no swelling of ankles. Problem List  Patient Active Problem List    Diagnosis Date Noted    Severe obesity (BMI 35.0-39.9) 2018    Hyperlipidemia 2018    [facility]    ACP (advance care planning) 2017    History of breast cancer 2017    IBS (irritable bowel syndrome)     Diabetes mellitus, type 2 ([facility]     HTN (hypertension) 2010       Current Medications  Current Outpatient Medications   Medication Sig    metFORMIN (GLUCOPHAGE) 1,000 mg tablet Take 1 Tab by mouth two (2) times daily (with meals).  carvediloL (COREG) 6.25 mg tablet Take 1 Tab by mouth two (2) times a day.  hydroCHLOROthiazide (HYDRODIURIL) 12.5 mg tablet Take 1 Tab by mouth daily.  glipiZIDE (GLUCOTROL) 5 mg tablet Take 1 Tab by mouth two (2) times a day.     ramipriL (ALTACE) 10 mg capsule TAKE 1 CAPSULE BY MOUTH EVERY DAY    lancets (ONETOUCH DELICA PLUS LANCET) 33 gauge misc Use once daily    Blood-Glucose Meter (ONETOUCH VERIO FLEX) misc Use once daily    glucose blood VI test strips (ONETOUCH VERIO) strip Use once daily    Blood Pressure Monitor (BLOOD PRESSURE KIT) kit Use twice daily    ibuprofen (MOTRIN) 600 mg tablet Take 1 Tab by mouth every six (6) hours as needed.  hydroxychloroquine (PLAQUENIL) 200 mg tablet Take 200 mg by mouth daily.  aspirin delayed-release 81 mg tablet Take  by mouth daily.  colesevelam (WELCHOL) 625 mg tablet Take 1,875 mg by mouth two (2) times daily (with meals). No current facility-administered medications for this visit. Allergies/Drug Reactions  Allergies   Allergen Reactions    Latex Hives    Atorvastatin Nausea Only    Metformin Diarrhea    Penicillins Hives        Review of Systems  Review of Systems   Constitutional: Negative for chills, fever and weight loss. Eyes: Negative. Respiratory: Negative. Cardiovascular: Negative. Gastrointestinal: Negative. Genitourinary: Negative. Musculoskeletal: Negative. Neurological: Negative. Psychiatric/Behavioral: Negative. Physical Exam  Vital signs:   Vitals:    10/15/20 0939   BP: 118/71   Pulse: 77   Resp: 15   Temp: 97.7 °F (36.5 °C)   TempSrc: Temporal   SpO2: 100%   Weight: 200 lb 9.6 oz (91 kg)   Height: 5' 2\" (1.575 m)       General: alert, oriented, not in distress  Head: scalp normal, atraumatic  Eyes: pupils are equal and reactive, full and intact EOM's  Neck: supple, no JVD, no lymphadenopathy, non-palpable thyroid  Chest/Lungs: clear breath sounds, no wheezing or crackles  Heart: normal rate, regular rhythm, no murmur  Extremities: no focal deformities, no edema  Skin: no active skin lesions  Foot exam: no open cuts, ulcers or wounds. DP full and equal. Sensation intact with 5.07 g monofilament wire bilaterally.       Laboratory/Tests:  Component      Latest Ref Rng & Units 4/8/2020 11/7/2019 11/7/2019           8:27 AM  9:36 AM  9:36 AM   Sodium      136 - 145 mmol/L 138     Potassium      3.5 - 5.5 mmol/L 4.6     Chloride      100 - 111 mmol/L 102     CO2      21 - 32 mmol/L 27     Anion gap      3.0 - 18 mmol/L 9     Glucose      74 - 99 mg/dL 178 (H)     BUN      7.0 - 18 MG/DL 19 (H)     Creatinine      0.6 - 1.3 MG/DL 1.05     BUN/Creatinine ratio      12 - 20   18     GFR est AA      >60 ml/min/1.73m2 >60     GFR est non-AA      >60 ml/min/1.73m2 52 (L)     Calcium      8.5 - 10.1 MG/DL 8.9     Cholesterol, total      <200 MG/DL  181    Triglyceride      <150 MG/DL  109    HDL Cholesterol      40 - 60 MG/DL  70 (H)    LDL, calculated      0 - 100 MG/DL  89.2    VLDL, calculated      MG/DL  21.8    CHOL/HDL Ratio      0 - 5.0    2.6    TSH      0.36 - 3.74 uIU/mL   1.18       Assessment/Plan:      1. Type 2 diabetes mellitus without complication, without long-term current use of insulin (HCC)  - controlled  - AMB POC HEMOGLOBIN A1C 6.5%  - continue metformin and glipizide  - Bydureon was discontinued 3 months ago due to cost.  - HEMOGLOBIN A1C WITH EAG; Future  - METABOLIC PANEL, COMPREHENSIVE; Future  - MICROALBUMIN, UR, RAND W/ MICROALB/CREAT RATIO; Future  - metFORMIN (GLUCOPHAGE) 1,000 mg tablet; Take 1 Tab by mouth two (2) times daily (with meals). Dispense: 180 Tab; Refill: 3  - hydroCHLOROthiazide (HYDRODIURIL) 12.5 mg tablet; Take 1 Tab by mouth daily. Dispense: 90 Tab; Refill: 3    2. Essential hypertension  - controlled  - CBC WITH AUTOMATED DIFF; Future  - METABOLIC PANEL, COMPREHENSIVE; Future  - TSH 3RD GENERATION; Future  - URINALYSIS W/ RFLX MICROSCOPIC; Future  - carvediloL (COREG) 6.25 mg tablet; Take 1 Tab by mouth two (2) times a day. Dispense: 180 Tab; Refill: 3    3. Hyperlipidemia, unspecified hyperlipidemia type  - controlled  - LIPID PANEL; Future  - METABOLIC PANEL, COMPREHENSIVE; Future    Follow-up and Dispositions    · Return in about 3 months (around 1/15/2021) for DM, HTN, HLD (DOXY). I have discussed the diagnosis with the patient and the intended plan as seen in the above orders.   The patient has received an after-visit summary and questions were answered concerning future plans. I have discussed medication side effects and warnings with the patient as well. I have reviewed the plan of care with the patient, accepted their input and they are in agreement with the treatment goals.        Kelsy Cohn MD  October 15, 2020

## 2020-10-19 NOTE — TELEPHONE ENCOUNTER
CLINICAL PHARMACY CONSULT: MED RECONCILIATION/REVIEW ADDENDUM  For Pharmacy Admin Tracking Only  PHSO: PHSO Patient?: Yes  Total # of Interventions Recommended: Count: 1  - New Order #: 1 New Medication Order Reason(s): Needs Additional Medication Therapy  - Maintenance Safety Lab Monitoring #: 1  Recommended intervention potential cost savings: 0  Total Interventions Accepted: 0  Time Spent (min): Rico Terry, [facility], PharmD  [address]

## 2020-11-12 DIAGNOSIS — E11.9 TYPE 2 DIABETES MELLITUS WITHOUT COMPLICATION, WITHOUT LONG-TERM CURRENT USE OF INSULIN (HCC): ICD-10-CM

## 2020-11-12 RX ORDER — BLOOD SUGAR DIAGNOSTIC
STRIP MISCELLANEOUS
Qty: 100 STRIP | Refills: 5 | Status: SHIPPED | OUTPATIENT
Start: 2020-11-12 | End: 2021-12-06

## 2020-12-17 DIAGNOSIS — E11.9 TYPE 2 DIABETES MELLITUS WITHOUT COMPLICATION, WITHOUT LONG-TERM CURRENT USE OF INSULIN (HCC): ICD-10-CM

## 2020-12-17 RX ORDER — LANCETS 33 GAUGE
EACH MISCELLANEOUS
Qty: 100 LANCET | Refills: 3 | Status: SHIPPED | OUTPATIENT
Start: 2020-12-17 | End: 2021-12-06 | Stop reason: SDUPTHER

## 2021-01-08 ENCOUNTER — HOSPITAL ENCOUNTER (OUTPATIENT)
Dept: LAB | Age: 71
Discharge: HOME | End: 2021-01-08
Payer: COMMERCIAL

## 2021-01-08 ENCOUNTER — APPOINTMENT (OUTPATIENT)
Dept: FAMILY MEDICINE CLINIC | Age: 71
End: 2021-01-08

## 2021-01-08 DIAGNOSIS — E78.5 HYPERLIPIDEMIA, UNSPECIFIED HYPERLIPIDEMIA TYPE: ICD-10-CM

## 2021-01-08 DIAGNOSIS — I10 ESSENTIAL HYPERTENSION: ICD-10-CM

## 2021-01-08 DIAGNOSIS — E11.9 TYPE 2 DIABETES MELLITUS WITHOUT COMPLICATION, WITHOUT LONG-TERM CURRENT USE OF INSULIN (HCC): ICD-10-CM

## 2021-01-08 LAB
ALBUMIN SERPL-MCNC: 3.6 G/DL (ref 3.4–5)
ALBUMIN/GLOB SERPL: 0.8 {RATIO} (ref 0.8–1.7)
ALP SERPL-CCNC: 60 U/L (ref 45–117)
ALT SERPL-CCNC: 21 U/L (ref 13–56)
ANION GAP SERPL CALC-SCNC: 8 MMOL/L (ref 3–18)
APPEARANCE UR: CLEAR
AST SERPL-CCNC: 11 U/L (ref 10–38)
BACTERIA URNS QL MICRO: ABNORMAL /HPF
BASOPHILS # BLD: 0 K/UL (ref 0–0.1)
BASOPHILS NFR BLD: 0 % (ref 0–2)
BILIRUB SERPL-MCNC: 0.4 MG/DL (ref 0.2–1)
BILIRUB UR QL: NEGATIVE
BUN SERPL-MCNC: 26 MG/DL (ref 7–18)
BUN/CREAT SERPL: 19 (ref 12–20)
CALCIUM SERPL-MCNC: 9.2 MG/DL (ref 8.5–10.1)
CHLORIDE SERPL-SCNC: 105 MMOL/L (ref 100–111)
CHOLEST SERPL-MCNC: 178 MG/DL
CO2 SERPL-SCNC: 27 MMOL/L (ref 21–32)
COLOR UR: YELLOW
CREAT SERPL-MCNC: 1.34 MG/DL (ref 0.6–1.3)
CREAT UR-MCNC: 110 MG/DL (ref 30–125)
DIFFERENTIAL METHOD BLD: ABNORMAL
EOSINOPHIL # BLD: 0.3 K/UL (ref 0–0.4)
EOSINOPHIL NFR BLD: 3 % (ref 0–5)
EPITH CASTS URNS QL MICRO: ABNORMAL /LPF (ref 0–5)
ERYTHROCYTE [DISTWIDTH] IN BLOOD BY AUTOMATED COUNT: 14.4 % (ref 11.6–14.5)
EST. AVERAGE GLUCOSE BLD GHB EST-MCNC: 151 MG/DL
GLOBULIN SER CALC-MCNC: 4.3 G/DL (ref 2–4)
GLUCOSE SERPL-MCNC: 122 MG/DL (ref 74–99)
GLUCOSE UR STRIP.AUTO-MCNC: NEGATIVE MG/DL
HBA1C MFR BLD: 6.9 % (ref 4.2–5.6)
HCT VFR BLD AUTO: 37.5 % (ref 35–45)
HDLC SERPL-MCNC: 82 MG/DL (ref 40–60)
HDLC SERPL: 2.2 {RATIO} (ref 0–5)
HGB BLD-MCNC: 11.8 G/DL (ref 12–16)
HGB UR QL STRIP: NEGATIVE
HYALINE CASTS URNS QL MICRO: ABNORMAL /LPF (ref 0–2)
KETONES UR QL STRIP.AUTO: NEGATIVE MG/DL
LDLC SERPL CALC-MCNC: 75.2 MG/DL (ref 0–100)
LEUKOCYTE ESTERASE UR QL STRIP.AUTO: ABNORMAL
LIPID PROFILE,FLP: ABNORMAL
LYMPHOCYTES # BLD: 2.5 K/UL (ref 0.9–3.6)
LYMPHOCYTES NFR BLD: 31 % (ref 21–52)
MCH RBC QN AUTO: 25.1 PG (ref 24–34)
MCHC RBC AUTO-ENTMCNC: 31.5 G/DL (ref 31–37)
MCV RBC AUTO: 79.8 FL (ref 74–97)
MICROALBUMIN UR-MCNC: 1.09 MG/DL (ref 0–3)
MICROALBUMIN/CREAT UR-RTO: 10 MG/G (ref 0–30)
MONOCYTES # BLD: 0.7 K/UL (ref 0.05–1.2)
MONOCYTES NFR BLD: 8 % (ref 3–10)
NEUTS SEG # BLD: 4.7 K/UL (ref 1.8–8)
NEUTS SEG NFR BLD: 58 % (ref 40–73)
NITRITE UR QL STRIP.AUTO: NEGATIVE
PH UR STRIP: 5 [PH] (ref 5–8)
PLATELET # BLD AUTO: 375 K/UL (ref 135–420)
PMV BLD AUTO: 9.9 FL (ref 9.2–11.8)
POTASSIUM SERPL-SCNC: 4.4 MMOL/L (ref 3.5–5.5)
PROT SERPL-MCNC: 7.9 G/DL (ref 6.4–8.2)
PROT UR STRIP-MCNC: NEGATIVE MG/DL
RBC # BLD AUTO: 4.7 M/UL (ref 4.2–5.3)
RBC #/AREA URNS HPF: NEGATIVE /HPF (ref 0–5)
SODIUM SERPL-SCNC: 140 MMOL/L (ref 136–145)
SP GR UR REFRACTOMETRY: 1.02 (ref 1–1.03)
TRIGL SERPL-MCNC: 104 MG/DL (ref ?–150)
TSH SERPL DL<=0.05 MIU/L-ACNC: 1.53 UIU/ML (ref 0.36–3.74)
UROBILINOGEN UR QL STRIP.AUTO: 0.2 EU/DL (ref 0.2–1)
VLDLC SERPL CALC-MCNC: 20.8 MG/DL
WBC # BLD AUTO: 8.2 K/UL (ref 4.6–13.2)
WBC URNS QL MICRO: ABNORMAL /HPF (ref 0–4)

## 2021-01-08 PROCEDURE — 36415 COLL VENOUS BLD VENIPUNCTURE: CPT

## 2021-01-08 PROCEDURE — 85025 COMPLETE CBC W/AUTO DIFF WBC: CPT

## 2021-01-08 PROCEDURE — 83036 HEMOGLOBIN GLYCOSYLATED A1C: CPT

## 2021-01-08 PROCEDURE — 82043 UR ALBUMIN QUANTITATIVE: CPT

## 2021-01-08 PROCEDURE — 80061 LIPID PANEL: CPT

## 2021-01-08 PROCEDURE — 84443 ASSAY THYROID STIM HORMONE: CPT

## 2021-01-08 PROCEDURE — 81001 URINALYSIS AUTO W/SCOPE: CPT

## 2021-01-08 PROCEDURE — 80053 COMPREHEN METABOLIC PANEL: CPT

## 2021-01-14 ENCOUNTER — TRANSCRIBE ORDER (OUTPATIENT)
Dept: SCHEDULING | Age: 71
End: 2021-01-14

## 2021-01-14 DIAGNOSIS — Z12.31 SCREENING MAMMOGRAM FOR HIGH-RISK PATIENT: Primary | ICD-10-CM

## 2021-01-15 ENCOUNTER — OFFICE VISIT (OUTPATIENT)
Dept: FAMILY MEDICINE CLINIC | Age: 71
End: 2021-01-15
Payer: COMMERCIAL

## 2021-01-15 VITALS
RESPIRATION RATE: 16 BRPM | BODY MASS INDEX: 37.17 KG/M2 | TEMPERATURE: 97.5 F | HEART RATE: 71 BPM | OXYGEN SATURATION: 100 % | HEIGHT: 62 IN | SYSTOLIC BLOOD PRESSURE: 111 MMHG | WEIGHT: 202 LBS | DIASTOLIC BLOOD PRESSURE: 69 MMHG

## 2021-01-15 DIAGNOSIS — I10 ESSENTIAL HYPERTENSION: ICD-10-CM

## 2021-01-15 DIAGNOSIS — E78.5 HYPERLIPIDEMIA, UNSPECIFIED HYPERLIPIDEMIA TYPE: ICD-10-CM

## 2021-01-15 DIAGNOSIS — N17.9 AKI (ACUTE KIDNEY INJURY) (HCC): ICD-10-CM

## 2021-01-15 DIAGNOSIS — E11.9 TYPE 2 DIABETES MELLITUS WITHOUT COMPLICATION, WITHOUT LONG-TERM CURRENT USE OF INSULIN (HCC): Primary | ICD-10-CM

## 2021-01-15 PROCEDURE — 99214 OFFICE O/P EST MOD 30 MIN: CPT | Performed by: INTERNAL MEDICINE

## 2021-01-15 RX ORDER — GLIPIZIDE 5 MG/1
TABLET ORAL
Qty: 180 TAB | Refills: 1 | Status: SHIPPED | OUTPATIENT
Start: 2021-01-15 | End: 2021-06-02 | Stop reason: SDUPTHER

## 2021-01-15 NOTE — PROGRESS NOTES
History of Present Illness  Kelin Harper is a 78 y.o. female who presents today for management of    Chief Complaint   Patient presents with    Diabetes    Hypertension    Cholesterol Problem       Diabetes Mellitus:  The patient has diabetes, hypertension and hyperlipidemia. Diabetic ROS - medication compliance: compliant all of the time, diabetic diet compliance: compliant most of the time, home glucose monitoring: values are usually normal.   Lab review: labs are reviewed, up to date and normal.   Cardiovascular Review:  Diet and Lifestyle: generally follows a low fat low cholesterol diet, generally follows a low sodium diet, nonsmoker  Home BP Monitoring: is well controlled at home, ranging 120's/60's. Pertinent ROS: taking medications as instructed, no medication side effects noted, no TIA's, no chest pain on exertion, no dyspnea on exertion, no swelling of ankles. Problem List  Patient Active Problem List    Diagnosis Date Noted    SHILPA (acute kidney injury) ([facility] 01/15/2021    Severe obesity (BMI 35.0-39.9) 06/05/2018    Hyperlipidemia 06/05/2018    Connective tissue disorder ([facility] 01/12/2018    ACP (advance care planning) 11/20/2017    History of breast cancer 11/20/2017    IBS (irritable bowel syndrome)     Diabetes mellitus, type 2 ([facility]     HTN (hypertension) 04/05/2010       Current Medications  Current Outpatient Medications   Medication Sig    lancets (One Touch Delica) 33 gauge misc USE ONCE DAILY AS DIRECTED    glucose blood VI test strips (OneTouch Verio test strips) strip USE ONCE DAILY AS DIRECTED    glipiZIDE (GLUCOTROL) 5 mg tablet TAKE 1 TABLET BY MOUTH TWICE A DAY    metFORMIN (GLUCOPHAGE) 1,000 mg tablet Take 1 Tab by mouth two (2) times daily (with meals).  carvediloL (COREG) 6.25 mg tablet Take 1 Tab by mouth two (2) times a day.  hydroCHLOROthiazide (HYDRODIURIL) 12.5 mg tablet Take 1 Tab by mouth daily.     ramipriL (ALTACE) 10 mg capsule TAKE 1 CAPSULE BY MOUTH EVERY DAY    Blood-Glucose Meter (ONETOUCH VERIO FLEX) misc Use once daily    Blood Pressure Monitor (BLOOD PRESSURE KIT) kit Use twice daily    ibuprofen (MOTRIN) 600 mg tablet Take 1 Tab by mouth every six (6) hours as needed.  hydroxychloroquine (PLAQUENIL) 200 mg tablet Take 200 mg by mouth daily.  aspirin delayed-release 81 mg tablet Take  by mouth daily.  colesevelam (WELCHOL) 625 mg tablet Take 1,875 mg by mouth two (2) times daily (with meals). No current facility-administered medications for this visit. Allergies/Drug Reactions  Allergies   Allergen Reactions    Latex Hives    Atorvastatin Nausea Only    Metformin Diarrhea    Penicillins Hives        Review of Systems  Review of Systems   Constitutional: Negative for chills, fever, malaise/fatigue and weight loss. Respiratory: Negative. Cardiovascular: Negative. Gastrointestinal: Negative. Musculoskeletal: Positive for joint pain. Neurological: Negative. Psychiatric/Behavioral: Negative.            Physical Exam  Vital signs:   Vitals:    01/15/21 0905   BP: 111/69   Pulse: 71   Resp: 16   Temp: 97.5 °F (36.4 °C)   TempSrc: Temporal   SpO2: 100%   Weight: 202 lb (91.6 kg)   Height: 5' 2\" (1.575 m)       General: alert, oriented, not in distress  Head: scalp normal, atraumatic  Eyes: pupils are equal and reactive, full and intact EOM's  Neck: supple, no JVD, no lymphadenopathy, non-palpable thyroid  Chest/Lungs: clear breath sounds, no wheezing or crackles  Heart: normal rate, regular rhythm, no murmur  Extremities: no focal deformities, no edema  Skin: no active skin lesions      Laboratory/Tests:  Component      Latest Ref Rng & Units 1/8/2021 1/8/2021 1/8/2021 1/8/2021           9:02 AM  9:02 AM  9:02 AM  9:02 AM   WBC      4.6 - 13.2 K/uL    8.2   RBC      4.20 - 5.30 M/uL    4.70   HGB      12.0 - 16.0 g/dL    11.8 (L)   HCT      35.0 - 45.0 %    37.5   MCV      74.0 - 97.0 FL    79.8   MCH      24.0 - 34.0 PG    25.1   MCHC      31.0 - 37.0 g/dL    31.5   RDW      11.6 - 14.5 %    14.4   PLATELET      749 - 945 K/uL    375   MPV      9.2 - 11.8 FL    9.9   NEUTROPHILS      40 - 73 %    58   LYMPHOCYTES      21 - 52 %    31   MONOCYTES      3 - 10 %    8   EOSINOPHILS      0 - 5 %    3   BASOPHILS      0 - 2 %    0   ABS. NEUTROPHILS      1.8 - 8.0 K/UL    4.7   ABS. LYMPHOCYTES      0.9 - 3.6 K/UL    2.5   ABS. MONOCYTES      0.05 - 1.2 K/UL    0.7   ABS. EOSINOPHILS      0.0 - 0.4 K/UL    0.3   ABS. BASOPHILS      0.0 - 0.1 K/UL    0.0   DF          AUTOMATED   Sodium      136 - 145 mmol/L  140     Potassium      3.5 - 5.5 mmol/L  4.4     Chloride      100 - 111 mmol/L  105     CO2      21 - 32 mmol/L  27     Anion gap      3.0 - 18 mmol/L  8     Glucose      74 - 99 mg/dL  122 (H)     BUN      7.0 - 18 MG/DL  26 (H)     Creatinine      0.6 - 1.3 MG/DL  1.34 (H)     BUN/Creatinine ratio      12 - 20    19     GFR est AA      >60 ml/min/1.73m2  48 (L)     GFR est non-AA      >60 ml/min/1.73m2  39 (L)     Calcium      8.5 - 10.1 MG/DL  9.2     Bilirubin, total      0.2 - 1.0 MG/DL  0.4     ALT      13 - 56 U/L  21     AST      10 - 38 U/L  11     Alk.  phosphatase      45 - 117 U/L  60     Protein, total      6.4 - 8.2 g/dL  7.9     Albumin      3.4 - 5.0 g/dL  3.6     Globulin      2.0 - 4.0 g/dL  4.3 (H)     A-G Ratio      0.8 - 1.7    0.8     Cholesterol, total      <200 MG/DL   178    Triglyceride      <150 MG/DL   104    HDL Cholesterol      40 - 60 MG/DL   82 (H)    LDL, calculated      0 - 100 MG/DL   75.2    VLDL, calculated      MG/DL   20.8    CHOL/HDL Ratio      0 - 5.0     2.2    Hemoglobin A1c, (calculated)      4.2 - 5.6 % 6.9 (H)      Est. average glucose      mg/dL 151        Component      Latest Ref Rng & Units 1/8/2021 1/8/2021 1/8/2021           9:02 AM  9:02 AM  9:02 AM   Color       YELLOW     Appearance       CLEAR     Specific gravity      1.005 - 1.030   1.017     pH (UA)      5.0 - 8.0 5.0     Protein      NEG mg/dL Negative     Glucose      NEG mg/dL Negative     Ketone      NEG mg/dL Negative     Bilirubin      NEG   Negative     Blood      NEG   Negative     Urobilinogen      0.2 - 1.0 EU/dL 0.2     Nitrites      NEG   Negative     Leukocyte Esterase      NEG   MODERATE (A)     Microalbumin,urine random      0 - 3.0 MG/DL  1.09    Creatinine, urine      30 - 125 mg/dL  110.00    Microalbumin/Creat. Ratio      0 - 30 mg/g  10    TSH      0.36 - 3.74 uIU/mL   1.53       Assessment/Plan:      1. Type 2 diabetes mellitus without complication, without long-term current use of insulin (HCC)  - controlled  - continue metformin and glipizide    2. Hyperlipidemia, unspecified hyperlipidemia type  - controlled  - continue Welchol    3. Essential hypertension  - controlled    4. SHILPA (acute kidney injury) ([facility]  - increase water intake  - avoid NSAIDs  - RENAL FUNCTION PANEL; Future in 3 months    Follow-up and Dispositions    · Return in about 3 months (around 4/15/2021) for ROV, telemedicine. I have discussed the diagnosis with the patient and the intended plan as seen in the above orders. The patient has received an after-visit summary and questions were answered concerning future plans. I have discussed medication side effects and warnings with the patient as well. I have reviewed the plan of care with the patient, accepted their input and they are in agreement with the treatment goals.        Irish Becerra MD  January 15, 2021

## 2021-02-05 ENCOUNTER — HOSPITAL ENCOUNTER (OUTPATIENT)
Dept: MAMMOGRAPHY | Age: 71
Discharge: HOME | End: 2021-02-05
Attending: INTERNAL MEDICINE
Payer: COMMERCIAL

## 2021-02-05 DIAGNOSIS — Z12.31 SCREENING MAMMOGRAM FOR HIGH-RISK PATIENT: ICD-10-CM

## 2021-02-05 PROCEDURE — 77063 BREAST TOMOSYNTHESIS BI: CPT

## 2021-03-02 RX ORDER — RAMIPRIL 10 MG/1
CAPSULE ORAL
Qty: 90 CAP | Refills: 2 | Status: SHIPPED | OUTPATIENT
Start: 2021-03-02 | End: 2021-12-06

## 2021-04-08 ENCOUNTER — HOSPITAL ENCOUNTER (OUTPATIENT)
Dept: LAB | Age: 71
Discharge: HOME | End: 2021-04-08
Payer: COMMERCIAL

## 2021-04-08 DIAGNOSIS — N17.9 AKI (ACUTE KIDNEY INJURY) (HCC): ICD-10-CM

## 2021-04-08 LAB
ALBUMIN SERPL-MCNC: 3.6 G/DL (ref 3.4–5)
ANION GAP SERPL CALC-SCNC: 5 MMOL/L (ref 3–18)
BUN SERPL-MCNC: 22 MG/DL (ref 7–18)
BUN/CREAT SERPL: 20 (ref 12–20)
CALCIUM SERPL-MCNC: 8.7 MG/DL (ref 8.5–10.1)
CHLORIDE SERPL-SCNC: 105 MMOL/L (ref 100–111)
CO2 SERPL-SCNC: 29 MMOL/L (ref 21–32)
CREAT SERPL-MCNC: 1.11 MG/DL (ref 0.6–1.3)
GLUCOSE SERPL-MCNC: 111 MG/DL (ref 74–99)
PHOSPHATE SERPL-MCNC: 3.5 MG/DL (ref 2.5–4.9)
POTASSIUM SERPL-SCNC: 4.3 MMOL/L (ref 3.5–5.5)
SODIUM SERPL-SCNC: 139 MMOL/L (ref 136–145)

## 2021-04-08 PROCEDURE — 36415 COLL VENOUS BLD VENIPUNCTURE: CPT

## 2021-04-08 PROCEDURE — 80069 RENAL FUNCTION PANEL: CPT

## 2021-04-15 ENCOUNTER — VIRTUAL VISIT (OUTPATIENT)
Dept: FAMILY MEDICINE CLINIC | Age: 71
End: 2021-04-15
Payer: COMMERCIAL

## 2021-04-15 DIAGNOSIS — N17.9 AKI (ACUTE KIDNEY INJURY) (HCC): ICD-10-CM

## 2021-04-15 DIAGNOSIS — I10 ESSENTIAL HYPERTENSION: ICD-10-CM

## 2021-04-15 DIAGNOSIS — E78.5 HYPERLIPIDEMIA, UNSPECIFIED HYPERLIPIDEMIA TYPE: ICD-10-CM

## 2021-04-15 DIAGNOSIS — E11.9 TYPE 2 DIABETES MELLITUS WITHOUT COMPLICATION, WITHOUT LONG-TERM CURRENT USE OF INSULIN (HCC): Primary | ICD-10-CM

## 2021-04-15 DIAGNOSIS — M35.9 CONNECTIVE TISSUE DISORDER (HCC): ICD-10-CM

## 2021-04-15 PROCEDURE — 99214 OFFICE O/P EST MOD 30 MIN: CPT | Performed by: INTERNAL MEDICINE

## 2021-04-15 NOTE — PROGRESS NOTES
Delonte Gregory is a 78 y.o. female who was seen by synchronous (real-time) audio-video technology using doxy. me on 4/15/2021. Location of the patient: Home    Location of the provider: [facility]    Consent:  She and/or health care decision maker is aware that that she may receive a bill for this telehealth service, depending on her insurance coverage, and has provided verbal consent to proceed: Yes    Subjective:   Delonte Gregory is a 78 y.o. female who presents today for management of    Chief Complaint   Patient presents with    Diabetes    Hypertension    Cholesterol Problem       Diabetes Mellitus:  The patient has diabetes, hypertension and hyperlipidemia. Diabetic ROS - medication compliance: compliant all of the time, diabetic diet compliance: compliant most of the time, home glucose monitoring: values are usually normal.   Lab review: labs reviewed, I note that glycosylated hemoglobin normal, lipids LDL result meets goal.   Cardiovascular Review:  Diet and Lifestyle: generally follows a low fat low cholesterol diet, generally follows a low sodium diet, sedentary, nonsmoker  Home BP Monitoring: is well controlled at home, ranging 130's/70's. Pertinent ROS: taking medications as instructed, no medication side effects noted, no TIA's, no chest pain on exertion, no dyspnea on exertion, no swelling of ankles. Problem List  Patient Active Problem List    Diagnosis Date Noted    SHILPA (acute kidney injury) ([facility] [address] 01/15/2021    Severe obesity (BMI 35.0-39.9) 06/05/2018    Hyperlipidemia 06/05/2018    Connective tissue disorder ([address] 01/12/2018    ACP (advance care planning) 11/20/2017    History of breast cancer 11/20/2017    IBS (irritable bowel syndrome)     Diabetes mellitus, type 2 ([address]     HTN (hypertension) 04/05/2010       Current Medications  Current Outpatient Medications   Medication Sig    dapagliflozin (Farxiga) 10 mg tab tablet Take 1 Tab by mouth daily.     ramipriL (ALTACE) 10 mg capsule TAKE 1 CAPSULE BY MOUTH EVERY DAY    glipiZIDE (GLUCOTROL) 5 mg tablet TAKE 1 TABLET BY MOUTH TWICE A DAY    lancets (One Touch Delica) 33 gauge misc USE ONCE DAILY AS DIRECTED    glucose blood VI test strips (OneTouch Verio test strips) strip USE ONCE DAILY AS DIRECTED    metFORMIN (GLUCOPHAGE) 1,000 mg tablet Take 1 Tab by mouth two (2) times daily (with meals).  carvediloL (COREG) 6.25 mg tablet Take 1 Tab by mouth two (2) times a day.  hydroCHLOROthiazide (HYDRODIURIL) 12.5 mg tablet Take 1 Tab by mouth daily.  Blood-Glucose Meter (ONETOUCH VERIO FLEX) misc Use once daily    Blood Pressure Monitor (BLOOD PRESSURE KIT) kit Use twice daily    ibuprofen (MOTRIN) 600 mg tablet Take 1 Tab by mouth every six (6) hours as needed.  hydroxychloroquine (PLAQUENIL) 200 mg tablet Take 200 mg by mouth daily.  aspirin delayed-release 81 mg tablet Take  by mouth daily.  colesevelam (WELCHOL) 625 mg tablet Take 1,875 mg by mouth two (2) times daily (with meals). No current facility-administered medications for this visit. Allergies/Drug Reactions  Allergies   Allergen Reactions    Latex Hives    Atorvastatin Nausea Only    Metformin Diarrhea    Penicillins Hives        Social History  Social History     Tobacco Use    Smoking status: Never Smoker    Smokeless tobacco: Never Used   Substance Use Topics    Alcohol use: No    Drug use: No        Review of Systems  Review of Systems   Constitutional: Negative. Respiratory: Negative. Cardiovascular: Negative. Gastrointestinal: Negative. Musculoskeletal: Positive for joint pain (knees). Neurological: Negative. Psychiatric/Behavioral: Negative.             Objective:     General: alert, cooperative, no distress   Mental  status: mental status: alert, oriented to person, place, and time, normal mood, behavior, speech, dress, motor activity, and thought processes   Resp: resp: normal effort and no respiratory distress   Neuro: neuro: no gross deficits   Skin: skin: no discoloration or lesions of concern on visible areas     Due to this being a TeleHealth evaluation, many elements of the physical examination are unable to be assessed. Lab Results   Component Value Date/Time    WBC 8.2 01/08/2021 09:02 AM    HGB 11.8 (L) 01/08/2021 09:02 AM    HCT 37.5 01/08/2021 09:02 AM    PLATELET 464 27/16/3920 09:02 AM    MCV 79.8 01/08/2021 09:02 AM     Lab Results   Component Value Date/Time    Hemoglobin A1c 6.9 (H) 01/08/2021 09:02 AM    Hemoglobin A1c 7.4 (H) 04/08/2020 08:27 AM    Hemoglobin A1c 7.5 (H) 11/20/2017 11:45 AM    Glucose 111 (H) 04/08/2021 08:09 AM    Microalbumin/Creat ratio (mg/g creat) 10 01/08/2021 09:02 AM    Microalbumin,urine random 1.09 01/08/2021 09:02 AM    LDL, calculated 75.2 01/08/2021 09:02 AM    Creatinine 1.11 04/08/2021 08:09 AM         Assessment & Plan:   1. Type 2 diabetes mellitus without complication, without long-term current use of insulin ([facility])  - controlled  - d/c glipizide  - start Farxiga  - HEMOGLOBIN A1C WITH EAG; Future  - METABOLIC PANEL, BASIC; Future  - dapagliflozin (Farxiga) 10 mg tab tablet; Take 1 Tab by mouth daily. Dispense: 90 Tab; Refill: 0    2. Essential hypertension  - controlled  - METABOLIC PANEL, BASIC; Future    3. Hyperlipidemia, unspecified hyperlipidemia type  - controlled    4. SHILPA (acute kidney injury) ([facility] [address] vs CKD stage 3  - METABOLIC PANEL, BASIC; Future in 3 months    5. Connective tissue disorder ([facility] [address]  - stable    Request colonoscopy report from GI    Follow-up and Dispositions    · Return in about 3 months (around 7/15/2021) for DM, HTN, HLD, doxy. We discussed the expected course, resolution and complications of the diagnosis(es) in detail. Medication risks, benefits, costs, interactions, and alternatives were discussed as indicated.   I advised her to contact the office if her condition worsens, changes or fails to improve as anticipated. She expressed understanding with the diagnosis(es) and plan. Pursuant to the emergency declaration under the [facility], [facility] waiver authority and the [employer] Act, this Virtual  Visit was conducted, with patient's consent, to reduce the patient's risk of exposure to COVID-19 and provide continuity of care for an established patient. Services were provided through a video synchronous discussion virtually to substitute for in-person clinic visit.     Radhames Macario MD

## 2021-06-02 ENCOUNTER — TELEPHONE (OUTPATIENT)
Dept: FAMILY MEDICINE CLINIC | Age: 71
End: 2021-06-02

## 2021-06-02 DIAGNOSIS — N89.8 VAGINA ITCHING: Primary | ICD-10-CM

## 2021-06-02 DIAGNOSIS — E11.9 TYPE 2 DIABETES MELLITUS WITHOUT COMPLICATION, WITHOUT LONG-TERM CURRENT USE OF INSULIN (HCC): ICD-10-CM

## 2021-06-02 RX ORDER — FLUCONAZOLE 150 MG/1
150 TABLET ORAL
Qty: 2 TABLET | Refills: 0 | Status: SHIPPED | OUTPATIENT
Start: 2021-06-02 | End: 2021-06-06

## 2021-06-02 RX ORDER — GLIPIZIDE 5 MG/1
5 TABLET ORAL DAILY
Qty: 30 TABLET | Refills: 0
Start: 2021-06-02 | End: 2021-07-26 | Stop reason: ALTCHOICE

## 2021-06-02 NOTE — TELEPHONE ENCOUNTER
Pt. Stated her blood sugars are still high in the morning and wanted to know if she could take the glipizide at night. She is has developed a yeats infection from [place] and is requesting Diflucan.  Please assist.

## 2021-06-02 NOTE — TELEPHONE ENCOUNTER
Fluconazole sent to pharmacy. Follow-up if symptoms persist or recur. Ok to restart glipizide once a day.

## 2021-06-06 DIAGNOSIS — E11.9 TYPE 2 DIABETES MELLITUS WITHOUT COMPLICATION, WITHOUT LONG-TERM CURRENT USE OF INSULIN (HCC): ICD-10-CM

## 2021-06-07 RX ORDER — DAPAGLIFLOZIN 10 MG/1
TABLET, FILM COATED ORAL
Qty: 90 TABLET | Refills: 0 | Status: SHIPPED | OUTPATIENT
Start: 2021-06-07 | End: 2021-10-14

## 2021-07-15 ENCOUNTER — HOSPITAL ENCOUNTER (OUTPATIENT)
Dept: LAB | Age: 71
Discharge: HOME | End: 2021-07-15
Payer: COMMERCIAL

## 2021-07-15 DIAGNOSIS — E11.9 TYPE 2 DIABETES MELLITUS WITHOUT COMPLICATION, WITHOUT LONG-TERM CURRENT USE OF INSULIN (HCC): ICD-10-CM

## 2021-07-15 DIAGNOSIS — N17.9 AKI (ACUTE KIDNEY INJURY) (HCC): ICD-10-CM

## 2021-07-15 DIAGNOSIS — I10 ESSENTIAL HYPERTENSION: ICD-10-CM

## 2021-07-15 LAB
ANION GAP SERPL CALC-SCNC: 6 MMOL/L (ref 3–18)
BUN SERPL-MCNC: 28 MG/DL (ref 7–18)
BUN/CREAT SERPL: 23 (ref 12–20)
CALCIUM SERPL-MCNC: 9 MG/DL (ref 8.5–10.1)
CHLORIDE SERPL-SCNC: 106 MMOL/L (ref 100–111)
CO2 SERPL-SCNC: 28 MMOL/L (ref 21–32)
CREAT SERPL-MCNC: 1.21 MG/DL (ref 0.6–1.3)
EST. AVERAGE GLUCOSE BLD GHB EST-MCNC: 169 MG/DL
GLUCOSE SERPL-MCNC: 125 MG/DL (ref 74–99)
HBA1C MFR BLD: 7.5 % (ref 4.2–5.6)
POTASSIUM SERPL-SCNC: 4.5 MMOL/L (ref 3.5–5.5)
SODIUM SERPL-SCNC: 140 MMOL/L (ref 136–145)

## 2021-07-15 PROCEDURE — 83036 HEMOGLOBIN GLYCOSYLATED A1C: CPT

## 2021-07-15 PROCEDURE — 36415 COLL VENOUS BLD VENIPUNCTURE: CPT

## 2021-07-15 PROCEDURE — 80048 BASIC METABOLIC PNL TOTAL CA: CPT

## 2021-07-26 ENCOUNTER — VIRTUAL VISIT (OUTPATIENT)
Dept: FAMILY MEDICINE CLINIC | Age: 71
End: 2021-07-26
Payer: MEDICARE

## 2021-07-26 DIAGNOSIS — N18.31 TYPE 2 DIABETES MELLITUS WITH STAGE 3A CHRONIC KIDNEY DISEASE, WITHOUT LONG-TERM CURRENT USE OF INSULIN (HCC): Primary | ICD-10-CM

## 2021-07-26 DIAGNOSIS — E11.22 TYPE 2 DIABETES MELLITUS WITH STAGE 3A CHRONIC KIDNEY DISEASE, WITHOUT LONG-TERM CURRENT USE OF INSULIN (HCC): Primary | ICD-10-CM

## 2021-07-26 DIAGNOSIS — I10 ESSENTIAL HYPERTENSION: ICD-10-CM

## 2021-07-26 DIAGNOSIS — N18.31 STAGE 3A CHRONIC KIDNEY DISEASE (HCC): ICD-10-CM

## 2021-07-26 PROCEDURE — 3051F HG A1C>EQUAL 7.0%<8.0%: CPT | Performed by: INTERNAL MEDICINE

## 2021-07-26 PROCEDURE — G8510 SCR DEP NEG, NO PLAN REQD: HCPCS | Performed by: INTERNAL MEDICINE

## 2021-07-26 PROCEDURE — 1090F PRES/ABSN URINE INCON ASSESS: CPT | Performed by: INTERNAL MEDICINE

## 2021-07-26 PROCEDURE — G8756 NO BP MEASURE DOC: HCPCS | Performed by: INTERNAL MEDICINE

## 2021-07-26 PROCEDURE — 3017F COLORECTAL CA SCREEN DOC REV: CPT | Performed by: INTERNAL MEDICINE

## 2021-07-26 PROCEDURE — G8417 CALC BMI ABV UP PARAM F/U: HCPCS | Performed by: INTERNAL MEDICINE

## 2021-07-26 PROCEDURE — G8399 PT W/DXA RESULTS DOCUMENT: HCPCS | Performed by: INTERNAL MEDICINE

## 2021-07-26 PROCEDURE — 1101F PT FALLS ASSESS-DOCD LE1/YR: CPT | Performed by: INTERNAL MEDICINE

## 2021-07-26 PROCEDURE — G8536 NO DOC ELDER MAL SCRN: HCPCS | Performed by: INTERNAL MEDICINE

## 2021-07-26 PROCEDURE — 2022F DILAT RTA XM EVC RTNOPTHY: CPT | Performed by: INTERNAL MEDICINE

## 2021-07-26 PROCEDURE — G9899 SCRN MAM PERF RSLTS DOC: HCPCS | Performed by: INTERNAL MEDICINE

## 2021-07-26 PROCEDURE — 99214 OFFICE O/P EST MOD 30 MIN: CPT | Performed by: INTERNAL MEDICINE

## 2021-07-26 PROCEDURE — G8427 DOCREV CUR MEDS BY ELIG CLIN: HCPCS | Performed by: INTERNAL MEDICINE

## 2021-07-26 RX ORDER — GLIPIZIDE 5 MG/1
5 TABLET, FILM COATED, EXTENDED RELEASE ORAL DAILY
Qty: 90 TABLET | Refills: 0 | Status: SHIPPED | OUTPATIENT
Start: 2021-07-26 | End: 2021-10-08 | Stop reason: SDUPTHER

## 2021-07-26 NOTE — PROGRESS NOTES
Anderson Trujillo is a 78 y.o. female who was seen by synchronous (real-time) audio-video technology using doxy. me on 7/26/2021. Location of the patient: Home    Location of the provider: [facility]    Consent:  She and/or health care decision maker is aware that that she may receive a bill for this telehealth service, depending on her insurance coverage, and has provided verbal consent to proceed: Yes    Subjective:   Anderson Trujillo is a 78 y.o. female who presents today for management of    Chief Complaint   Patient presents with    Diabetes       Diabetes Mellitus:  The patient has diabetes, hypertension and hyperlipidemia. Diabetic ROS - medication compliance: compliant all of the time, diabetic diet compliance: compliant most of the time, home glucose monitoring: fasting values range 100-110s, further diabetic ROS: no polyuria or polydipsia, no chest pain, dyspnea or TIA's, no numbness, tingling or pain in extremities. Lab review: labs reviewed, I note that glycosylated hemoglobin mildly abnormal but acceptable, lipids LDL result meets goal.   Cardiovascular Review:  Diet and Lifestyle: generally follows a low fat low cholesterol diet, generally follows a low sodium diet, nonsmoker  Home BP Monitoring: is not measured at home. Pertinent ROS: taking medications as instructed, no medication side effects noted, no TIA's, no chest pain on exertion, no dyspnea on exertion, no swelling of ankles.        Problem List  Patient Active Problem List    Diagnosis Date Noted    Stage 3a chronic kidney disease ([facility] [address] 01/15/2021    Severe obesity (BMI 35.0-39.9) 06/05/2018    Hyperlipidemia 06/05/2018    Connective tissue disorder ([facility] [address] 01/12/2018    ACP (advance care planning) 11/20/2017    History of breast cancer 11/20/2017    IBS (irritable bowel syndrome)     Diabetes mellitus, type 2 ([facility] [address]     HTN (hypertension) 04/05/2010       Current Medications  Current Outpatient Medications Medication Sig    glipiZIDE SR (GLUCOTROL XL) 5 mg CR tablet Take 1 Tablet by mouth daily.  Farxiga 10 mg tab tablet TAKE 1 TABLET BY MOUTH EVERY DAY    ramipriL (ALTACE) 10 mg capsule TAKE 1 CAPSULE BY MOUTH EVERY DAY    lancets (One Touch Delica) 33 gauge misc USE ONCE DAILY AS DIRECTED    glucose blood VI test strips (OneTouch Verio test strips) strip USE ONCE DAILY AS DIRECTED    metFORMIN (GLUCOPHAGE) 1,000 mg tablet Take 1 Tab by mouth two (2) times daily (with meals).  carvediloL (COREG) 6.25 mg tablet Take 1 Tab by mouth two (2) times a day.  hydroCHLOROthiazide (HYDRODIURIL) 12.5 mg tablet Take 1 Tab by mouth daily.  Blood-Glucose Meter (ONETOUCH VERIO FLEX) misc Use once daily    hydroxychloroquine (PLAQUENIL) 200 mg tablet Take 200 mg by mouth daily.  aspirin delayed-release 81 mg tablet Take  by mouth daily.  colesevelam (WELCHOL) 625 mg tablet Take 1,875 mg by mouth two (2) times daily (with meals). No current facility-administered medications for this visit. Allergies/Drug Reactions  Allergies   Allergen Reactions    Latex Hives    Atorvastatin Nausea Only    Metformin Diarrhea    Penicillins Hives        Social History  Social History     Tobacco Use    Smoking status: Never Smoker    Smokeless tobacco: Never Used   Substance Use Topics    Alcohol use: No    Drug use: No        Review of Systems  Review of Systems   Constitutional: Negative. Respiratory: Negative. Cardiovascular: Negative. Gastrointestinal: Negative. Musculoskeletal: Negative. Neurological: Negative. Psychiatric/Behavioral: Negative.           Objective:     General: alert, cooperative, no distress   Mental  status: mental status: alert, oriented to person, place, and time, normal mood, behavior, speech, dress, motor activity, and thought processes   Resp: resp: normal effort and no respiratory distress   Neuro: neuro: no gross deficits   Skin: skin: no discoloration or lesions of concern on visible areas     Due to this being a TeleHealth evaluation, many elements of the physical examination are unable to be assessed. Lab Results   Component Value Date/Time    Hemoglobin A1c 7.5 (H) 07/15/2021 08:32 AM    Hemoglobin A1c 6.9 (H) 01/08/2021 09:02 AM    Hemoglobin A1c 7.4 (H) 04/08/2020 08:27 AM    Glucose 125 (H) 07/15/2021 08:32 AM    Microalbumin/Creat ratio (mg/g creat) 10 01/08/2021 09:02 AM    Microalbumin,urine random 1.09 01/08/2021 09:02 AM    LDL, calculated 75.2 01/08/2021 09:02 AM    Creatinine 1.21 07/15/2021 08:32 AM         Assessment & Plan:   1. Type 2 diabetes mellitus with stage 3a chronic kidney disease, without long-term current use of insulin (HCC)  - fairly controlled  - continue current meds  - HEMOGLOBIN A1C WITH EAG; Future  - glipiZIDE SR (GLUCOTROL XL) 5 mg CR tablet; Take 1 Tablet by mouth daily. Dispense: 90 Tablet; Refill: 0    2. Stage 3a chronic kidney disease (HCC)  - stable  - avoid NSAIDs and dehydration  - RENAL FUNCTION PANEL; Future    3. Essential hypertension  - controlled      Follow-up and Dispositions    · Return in about 3 months (around 10/26/2021) for ROV, in-person. We discussed the expected course, resolution and complications of the diagnosis(es) in detail. Medication risks, benefits, costs, interactions, and alternatives were discussed as indicated. I advised her to contact the office if her condition worsens, changes or fails to improve as anticipated. She expressed understanding with the diagnosis(es) and plan. Pursuant to the emergency declaration under the [facility] waiver authority and the [employer] Act, this Virtual  Visit was conducted, with patient's consent, to reduce the patient's risk of exposure to COVID-19 and provide continuity of care for an established patient.      Services were provided through a video synchronous discussion virtually to substitute for in-person clinic visit.     Alia Arce MD

## 2021-10-07 DIAGNOSIS — E11.9 TYPE 2 DIABETES MELLITUS WITHOUT COMPLICATION, WITHOUT LONG-TERM CURRENT USE OF INSULIN (HCC): ICD-10-CM

## 2021-10-07 DIAGNOSIS — I10 ESSENTIAL HYPERTENSION: ICD-10-CM

## 2021-10-07 RX ORDER — HYDROCHLOROTHIAZIDE 12.5 MG/1
TABLET ORAL
Qty: 90 TABLET | Refills: 3 | Status: SHIPPED | OUTPATIENT
Start: 2021-10-07 | End: 2021-10-26 | Stop reason: ALTCHOICE

## 2021-10-07 RX ORDER — CARVEDILOL 6.25 MG/1
TABLET ORAL
Qty: 180 TABLET | Refills: 3 | Status: SHIPPED | OUTPATIENT
Start: 2021-10-07 | End: 2022-09-25

## 2021-10-08 DIAGNOSIS — N18.31 TYPE 2 DIABETES MELLITUS WITH STAGE 3A CHRONIC KIDNEY DISEASE, WITHOUT LONG-TERM CURRENT USE OF INSULIN (HCC): ICD-10-CM

## 2021-10-08 DIAGNOSIS — E11.22 TYPE 2 DIABETES MELLITUS WITH STAGE 3A CHRONIC KIDNEY DISEASE, WITHOUT LONG-TERM CURRENT USE OF INSULIN (HCC): ICD-10-CM

## 2021-10-08 DIAGNOSIS — E11.9 TYPE 2 DIABETES MELLITUS WITHOUT COMPLICATION, WITHOUT LONG-TERM CURRENT USE OF INSULIN (HCC): ICD-10-CM

## 2021-10-08 RX ORDER — GLIPIZIDE 5 MG/1
TABLET ORAL
Qty: 180 TABLET | Refills: 1 | OUTPATIENT
Start: 2021-10-08

## 2021-10-08 RX ORDER — GLIPIZIDE 5 MG/1
5 TABLET, FILM COATED, EXTENDED RELEASE ORAL DAILY
Qty: 90 TABLET | Refills: 1 | Status: SHIPPED | OUTPATIENT
Start: 2021-10-08 | End: 2021-10-26

## 2021-10-13 DIAGNOSIS — E11.9 TYPE 2 DIABETES MELLITUS WITHOUT COMPLICATION, WITHOUT LONG-TERM CURRENT USE OF INSULIN (HCC): ICD-10-CM

## 2021-10-13 RX ORDER — METFORMIN HYDROCHLORIDE 1000 MG/1
TABLET ORAL
Qty: 180 TABLET | Refills: 3 | Status: SHIPPED | OUTPATIENT
Start: 2021-10-13 | End: 2022-09-25

## 2021-10-14 DIAGNOSIS — E11.9 TYPE 2 DIABETES MELLITUS WITHOUT COMPLICATION, WITHOUT LONG-TERM CURRENT USE OF INSULIN (HCC): ICD-10-CM

## 2021-10-14 RX ORDER — DAPAGLIFLOZIN 10 MG/1
TABLET, FILM COATED ORAL
Qty: 90 TABLET | Refills: 0 | Status: SHIPPED | OUTPATIENT
Start: 2021-10-14 | End: 2021-10-15

## 2021-10-15 ENCOUNTER — PATIENT MESSAGE (OUTPATIENT)
Dept: FAMILY MEDICINE CLINIC | Age: 71
End: 2021-10-15

## 2021-10-25 ENCOUNTER — HOSPITAL ENCOUNTER (OUTPATIENT)
Dept: LAB | Age: 71
Discharge: HOME | End: 2021-10-25
Payer: MEDICARE

## 2021-10-25 DIAGNOSIS — N18.31 TYPE 2 DIABETES MELLITUS WITH STAGE 3A CHRONIC KIDNEY DISEASE, WITHOUT LONG-TERM CURRENT USE OF INSULIN (HCC): ICD-10-CM

## 2021-10-25 DIAGNOSIS — N18.31 STAGE 3A CHRONIC KIDNEY DISEASE (HCC): ICD-10-CM

## 2021-10-25 DIAGNOSIS — E11.22 TYPE 2 DIABETES MELLITUS WITH STAGE 3A CHRONIC KIDNEY DISEASE, WITHOUT LONG-TERM CURRENT USE OF INSULIN (HCC): ICD-10-CM

## 2021-10-25 LAB
ALBUMIN SERPL-MCNC: 3.5 G/DL (ref 3.4–5)
ANION GAP SERPL CALC-SCNC: 7 MMOL/L (ref 3–18)
BUN SERPL-MCNC: 35 MG/DL (ref 7–18)
BUN/CREAT SERPL: 26 (ref 12–20)
CALCIUM SERPL-MCNC: 8.9 MG/DL (ref 8.5–10.1)
CHLORIDE SERPL-SCNC: 104 MMOL/L (ref 100–111)
CO2 SERPL-SCNC: 26 MMOL/L (ref 21–32)
CREAT SERPL-MCNC: 1.37 MG/DL (ref 0.6–1.3)
EST. AVERAGE GLUCOSE BLD GHB EST-MCNC: 143 MG/DL
GLUCOSE SERPL-MCNC: 106 MG/DL (ref 74–99)
HBA1C MFR BLD: 6.6 % (ref 4.2–5.6)
PHOSPHATE SERPL-MCNC: 4.2 MG/DL (ref 2.5–4.9)
POTASSIUM SERPL-SCNC: 4.4 MMOL/L (ref 3.5–5.5)
SODIUM SERPL-SCNC: 137 MMOL/L (ref 136–145)

## 2021-10-25 PROCEDURE — 83036 HEMOGLOBIN GLYCOSYLATED A1C: CPT

## 2021-10-25 PROCEDURE — 80069 RENAL FUNCTION PANEL: CPT

## 2021-10-25 PROCEDURE — 36415 COLL VENOUS BLD VENIPUNCTURE: CPT

## 2021-10-26 ENCOUNTER — TELEPHONE (OUTPATIENT)
Dept: FAMILY MEDICINE CLINIC | Age: 71
End: 2021-10-26

## 2021-10-26 ENCOUNTER — OFFICE VISIT (OUTPATIENT)
Dept: FAMILY MEDICINE CLINIC | Age: 71
End: 2021-10-26
Payer: MEDICARE

## 2021-10-26 VITALS
DIASTOLIC BLOOD PRESSURE: 70 MMHG | HEIGHT: 62 IN | BODY MASS INDEX: 35.15 KG/M2 | HEART RATE: 71 BPM | WEIGHT: 191 LBS | TEMPERATURE: 97.6 F | SYSTOLIC BLOOD PRESSURE: 141 MMHG | OXYGEN SATURATION: 99 %

## 2021-10-26 DIAGNOSIS — I10 PRIMARY HYPERTENSION: ICD-10-CM

## 2021-10-26 DIAGNOSIS — Z00.00 MEDICARE ANNUAL WELLNESS VISIT, SUBSEQUENT: ICD-10-CM

## 2021-10-26 DIAGNOSIS — N18.31 STAGE 3A CHRONIC KIDNEY DISEASE (HCC): ICD-10-CM

## 2021-10-26 DIAGNOSIS — N18.31 TYPE 2 DIABETES MELLITUS WITH STAGE 3A CHRONIC KIDNEY DISEASE, WITHOUT LONG-TERM CURRENT USE OF INSULIN (HCC): Primary | ICD-10-CM

## 2021-10-26 DIAGNOSIS — E11.22 TYPE 2 DIABETES MELLITUS WITH STAGE 3A CHRONIC KIDNEY DISEASE, WITHOUT LONG-TERM CURRENT USE OF INSULIN (HCC): Primary | ICD-10-CM

## 2021-10-26 DIAGNOSIS — E78.5 HYPERLIPIDEMIA, UNSPECIFIED HYPERLIPIDEMIA TYPE: ICD-10-CM

## 2021-10-26 PROCEDURE — G8536 NO DOC ELDER MAL SCRN: HCPCS | Performed by: INTERNAL MEDICINE

## 2021-10-26 PROCEDURE — G8753 SYS BP > OR = 140: HCPCS | Performed by: INTERNAL MEDICINE

## 2021-10-26 PROCEDURE — G8510 SCR DEP NEG, NO PLAN REQD: HCPCS | Performed by: INTERNAL MEDICINE

## 2021-10-26 PROCEDURE — G0439 PPPS, SUBSEQ VISIT: HCPCS | Performed by: INTERNAL MEDICINE

## 2021-10-26 PROCEDURE — 99214 OFFICE O/P EST MOD 30 MIN: CPT | Performed by: INTERNAL MEDICINE

## 2021-10-26 PROCEDURE — 1090F PRES/ABSN URINE INCON ASSESS: CPT | Performed by: INTERNAL MEDICINE

## 2021-10-26 PROCEDURE — G8417 CALC BMI ABV UP PARAM F/U: HCPCS | Performed by: INTERNAL MEDICINE

## 2021-10-26 PROCEDURE — 3017F COLORECTAL CA SCREEN DOC REV: CPT | Performed by: INTERNAL MEDICINE

## 2021-10-26 PROCEDURE — 3044F HG A1C LEVEL LT 7.0%: CPT | Performed by: INTERNAL MEDICINE

## 2021-10-26 PROCEDURE — G8427 DOCREV CUR MEDS BY ELIG CLIN: HCPCS | Performed by: INTERNAL MEDICINE

## 2021-10-26 PROCEDURE — G9899 SCRN MAM PERF RSLTS DOC: HCPCS | Performed by: INTERNAL MEDICINE

## 2021-10-26 PROCEDURE — G8399 PT W/DXA RESULTS DOCUMENT: HCPCS | Performed by: INTERNAL MEDICINE

## 2021-10-26 PROCEDURE — 1101F PT FALLS ASSESS-DOCD LE1/YR: CPT | Performed by: INTERNAL MEDICINE

## 2021-10-26 PROCEDURE — 2022F DILAT RTA XM EVC RTNOPTHY: CPT | Performed by: INTERNAL MEDICINE

## 2021-10-26 PROCEDURE — G8754 DIAS BP LESS 90: HCPCS | Performed by: INTERNAL MEDICINE

## 2021-10-26 RX ORDER — AMLODIPINE BESYLATE 5 MG/1
5 TABLET ORAL
Qty: 90 TABLET | Refills: 0 | Status: SHIPPED | OUTPATIENT
Start: 2021-10-26 | End: 2021-12-06

## 2021-10-26 RX ORDER — GLIPIZIDE 10 MG/1
10 TABLET, FILM COATED, EXTENDED RELEASE ORAL DAILY
Qty: 90 TABLET | Refills: 0 | Status: SHIPPED | OUTPATIENT
Start: 2021-10-26 | End: 2022-01-26 | Stop reason: SINTOL

## 2021-10-26 NOTE — PATIENT INSTRUCTIONS
Medicare Wellness Visit, Female     The best way to live healthy is to have a lifestyle where you eat a well-balanced diet, exercise regularly, limit alcohol use, and quit all forms of tobacco/nicotine, if applicable. Regular preventive services are another way to keep healthy. Preventive services (vaccines, screening tests, monitoring & exams) can help personalize your care plan, which helps you manage your own care. Screening tests can find health problems at the earliest stages, when they are easiest to treat. [facility] follows the current, evidence-based guidelines published by the [facility] ([facility]) when recommending preventive services for our patients. Because we follow these guidelines, sometimes recommendations change over time as research supports it. (For example, mammograms used to be recommended annually. Even though Medicare will still pay for an annual mammogram, the newer guidelines recommend a mammogram every two years for women of average risk). Of course, you and your doctor may decide to screen more often for some diseases, based on your risk and your co-morbidities (chronic disease you are already diagnosed with). Preventive services for you include:  - Medicare offers their members a free annual wellness visit, which is time for you and your primary care provider to discuss and plan for your preventive service needs. Take advantage of this benefit every year!  -All adults over the age of 72 should receive the recommended pneumonia vaccines. Current USPSTF guidelines recommend a series of two vaccines for the best pneumonia protection.   -All adults should have a flu vaccine yearly and a tetanus vaccine every 10 years.   -All adults age 48 and older should receive the shingles vaccines (series of two vaccines).       -All adults age 38-68 who are overweight should have a diabetes screening test once every three years.   -All adults born between [birth year] and 1965 should be screened once for Hepatitis C.  -Other screening tests and preventive services for persons with diabetes include: an eye exam to screen for diabetic retinopathy, a kidney function test, a foot exam, and stricter control over your cholesterol.   -Cardiovascular screening for adults with routine risk involves an electrocardiogram (ECG) at intervals determined by your doctor.   -Colorectal cancer screenings should be done for adults age 54-65 with no increased risk factors for colorectal cancer. There are a number of acceptable methods of screening for this type of cancer. Each test has its own benefits and drawbacks. Discuss with your doctor what is most appropriate for you during your annual wellness visit. The different tests include: colonoscopy (considered the best screening method), a fecal occult blood test, a fecal DNA test, and sigmoidoscopy.    -A bone mass density test is recommended when a woman turns 65 to screen for osteoporosis. This test is only recommended one time, as a screening. Some providers will use this same test as a disease monitoring tool if you already have osteoporosis. -Breast cancer screenings are recommended every other year for women of normal risk, age 54-69.  -Cervical cancer screenings for women over age 72 are only recommended with certain risk factors.      Here is a list of your current Health Maintenance items (your personalized list of preventive services) with a due date:  Health Maintenance Due   Topic Date Due    Shingles Vaccine (1 of 2) Never done    DTaP/Tdap/Td  (2 - Td or Tdap) 04/05/2020    Diabetic Foot Care  10/15/2021

## 2021-10-26 NOTE — PROGRESS NOTES
Chief Complaint   Patient presents with    Hypertension     not taken meds today     Diabetes     bs fasting 88    Other     questions about januvia and aspirin    [facility] Visit     1. Have you been to the ER, urgent care clinic since your last visit? Hospitalized since your last visit? No    2. Have you seen or consulted any other health care providers outside of the [address] [place] [address] since your last visit? Include any pap smears or colon screening.  No     Health Maintenance Due   Topic Date Due    Shingrix Vaccine Age 49> (1 of 2) Never done    DTaP/Tdap/Td series (2 - Td or Tdap) 04/05/2020    Medicare Yearly Exam  07/26/2021    Foot Exam Q1  10/15/2021

## 2021-10-26 NOTE — TELEPHONE ENCOUNTER
Jesus Guzman MD     I can contact the patient to discuss any changes in therapy. Please consider adding rosuvastatin 5 mg daily (or other and titrate as patient able to tolerate) to your patient's regimen for the following reason:   · Patient has been identified as a statin use in persons with diabetes care gap per [place]     ADA Guidelines Age: >/= 36years old:   o History of ASCVD or 10-year ASCVD risk > 20% - high-intensity statin is recommended. Patient's calculated risk score is 25.7%  o Patient's most recent ALT is 21 U/L. Note patient prescribed atorvastatin in the past. Stopped due to nausea. Thank you,  Devendra Melo, PharmD, [facility], [place]  Toll free: [phone], option 2  ================================================  [facility]: STATIN THERAPY REVIEW  Identified statin use in persons with diabetes care gap per [employer] Records dated: 10/11/21.     Last Visit: 10/26/21    ASSESSMENT  STATIN GAP IDENTIFIED    Per chart review, patient was prescribed atorvastatin in the past and stopped due to nausea    Lab Results   Component Value Date/Time    Cholesterol, total 178 01/08/2021 09:02 AM    HDL Cholesterol 82 (H) 01/08/2021 09:02 AM    LDL, calculated 75.2 01/08/2021 09:02 AM    VLDL, calculated 20.8 01/08/2021 09:02 AM    Triglyceride 104 01/08/2021 09:02 AM    CHOL/HDL Ratio 2.2 01/08/2021 09:02 AM     ALT (SGPT)   Date Value Ref Range Status   01/08/2021 21 13 - 56 U/L Final     AST (SGOT)   Date Value Ref Range Status   01/08/2021 11 10 - 38 U/L Final     The 10-year ASCVD risk score (Jabari Champion, et al., 2013) is: 25.7%    Values used to calculate the score:      Age: 78 years      Sex: Female      Is Non- : Yes      Diabetic: Yes      Tobacco smoker: No      Systolic Blood Pressure: 774 mmHg      Is BP treated: Yes      HDL Cholesterol: 82 MG/DL      Total Cholesterol: 178 MG/DL     Hyperlipidemia Goal: Patient is not prescribed high-intensity statin therapy. 2019 ADA Guidelines Age:   [facility]  >/= 36years old:   o No history of ASCVD or 10-year ASCVD risk < 20% - moderate-intensity statin is recommended.   o History of ASCVD or 10-year ASCVD risk > 20% - high-intensity statin is recommended.      PLAN  The following are interventions that have been identified:  - Patient identified as having SUPD gap - note above sent to provider    Future Appointments   Date Time Provider Nohelia Cui   2/8/2022  1:15 PM [address] [facility] 1 [facility] [address], PharmD, [facility], [address] free: [phone], option 2

## 2021-10-26 NOTE — PROGRESS NOTES
History of Present Illness  Sid Garcia is a 78 y.o. female who presents today for management of    Chief Complaint   Patient presents with    Hypertension     not taken meds today     Diabetes     bs fasting 88    Annual Wellness Visit       Diabetes Mellitus:  The patient has diabetes, hypertension and hyperlipidemia. Diabetic ROS - medication compliance: compliant all of the time, diabetic diet compliance: compliant most of the time, home glucose monitoring: values are usually normal, further diabetic ROS: no polyuria or polydipsia, no chest pain, dyspnea or TIA's, no numbness, tingling or pain in extremities. Patient reports that Jardiance cost $430 for 3-month supply. Lab review: labs reviewed, I note that glycosylated hemoglobin normal, lipids LDL result meets goal.   Cardiovascular Review:  Diet and Lifestyle: generally follows a low fat low cholesterol diet, generally follows a low sodium diet, nonsmoker  Pertinent ROS: taking medications as instructed, no medication side effects noted, no TIA's, no chest pain on exertion, no dyspnea on exertion, no swelling of ankles. Problem List  Patient Active Problem List    Diagnosis Date Noted    Stage 3a chronic kidney disease ([facility] 01/15/2021    Severe obesity (BMI 35.0-39.9) 06/05/2018    Hyperlipidemia 06/05/2018    Connective tissue disorder ([facility] 01/12/2018    ACP (advance care planning) 11/20/2017    History of breast cancer 11/20/2017    IBS (irritable bowel syndrome)     Diabetes mellitus[facility] type 2 ([facility]     HTN (hypertension) 04/05/2010       Current Medications  Current Outpatient Medications   Medication Sig    glipiZIDE SR (GLUCOTROL XL) 10 mg CR tablet Take 1 Tablet by mouth daily.  amLODIPine (NORVASC) 5 mg tablet Take 1 Tablet by mouth nightly.     metFORMIN (GLUCOPHAGE) 1,000 mg tablet TAKE 1 TABLET BY MOUTH TWICE A DAY WITH MEALS    carvediloL (COREG) 6.25 mg tablet TAKE 1 TABLET BY MOUTH TWICE A DAY    ramipriL (ALTACE) 10 mg capsule TAKE 1 CAPSULE BY MOUTH EVERY DAY    lancets (One Touch Delica) 33 gauge misc USE ONCE DAILY AS DIRECTED    glucose blood VI test strips (OneTouch Verio test strips) strip USE ONCE DAILY AS DIRECTED    Blood-Glucose Meter (ONETOUCH VERIO FLEX) misc Use once daily    hydroxychloroquine (PLAQUENIL) 200 mg tablet Take 200 mg by mouth daily.  aspirin delayed-release 81 mg tablet Take  by mouth daily.  colesevelam (WELCHOL) 625 mg tablet Take 1,875 mg by mouth two (2) times daily (with meals). No current facility-administered medications for this visit. Allergies/Drug Reactions  Allergies   Allergen Reactions    Latex Hives    Atorvastatin Nausea Only     Patient declined      Metformin Diarrhea    Penicillins Hives        Review of Systems  Review of Systems   Constitutional: Negative. Respiratory: Negative. Cardiovascular: Negative. Gastrointestinal: Negative. Musculoskeletal: Negative. Neurological: Negative. Psychiatric/Behavioral: Negative.          Physical Exam  Vital signs:   Vitals:    10/26/21 0954   BP: (!) 141/70   Pulse: 71   Temp: 97.6 °F (36.4 °C)   TempSrc: Temporal   SpO2: 99%   Weight: 191 lb (86.6 kg)   Height: 5' 2\" (1.575 m)       General: alert, oriented, not in distress  Head: scalp normal, atraumatic  Eyes: pupils are equal and reactive, full and intact EOM's  Neck: supple, no JVD, no lymphadenopathy, non-palpable thyroid  Chest/Lungs: clear breath sounds, no wheezing or crackles  Heart: normal rate, regular rhythm, no murmur  Extremities: no focal deformities, no edema  Skin: no active skin lesions    Laboratory/Tests:  Lab Results   Component Value Date/Time    Hemoglobin A1c 6.6 (H) 10/25/2021 09:03 AM    Hemoglobin A1c 7.5 (H) 07/15/2021 08:32 AM    Hemoglobin A1c 6.9 (H) 01/08/2021 09:02 AM    Glucose 106 (H) 10/25/2021 09:03 AM    Microalbumin/Creat ratio (mg/g creat) 10 01/08/2021 09:02 AM    Microalbumin,urine random 1.09 01/08/2021 09:02 AM    LDL, calculated 75.2 01/08/2021 09:02 AM    Creatinine 1.37 (H) 10/25/2021 09:03 AM      Lab Results   Component Value Date/Time    GFR est non-AA 38 (L) 10/25/2021 09:03 AM    GFR est AA 46 (L) 10/25/2021 09:03 AM    Creatinine 1.37 (H) 10/25/2021 09:03 AM    BUN 35 (H) 10/25/2021 09:03 AM    Sodium 137 10/25/2021 09:03 AM    Potassium 4.4 10/25/2021 09:03 AM    Chloride 104 10/25/2021 09:03 AM    CO2 26 10/25/2021 09:03 AM    Phosphorus 4.2 10/25/2021 09:03 AM        Assessment/Plan:    1. Type 2 diabetes mellitus with stage 3a chronic kidney disease, without long-term current use of insulin (HCC)  - controlled  - d/c Jardiance due to cost  - increase glipiZIDE SR (GLUCOTROL XL) 10 mg CR tablet; Take 1 Tablet by mouth daily. Dispense: 90 Tablet; Refill: 0  - continue metformin    2. Stage 3a chronic kidney disease (HCC)  - stable  - avoid nephrotoxins  - d/c hCTZ    3. Primary hypertension  - controlled  - d/c HCTZ  - start amLODIPine (NORVASC) 5 mg tablet; Take 1 Tablet by mouth nightly. Dispense: 90 Tablet; Refill: 0  - continue ramipril    4. Hyperlipidemia, unspecified hyperlipidemia type  - controlled      Follow-up and Dispositions    · Return in about 3 months (around 1/26/2022), or if symptoms worsen or fail to improve, for ROV. I have discussed the diagnosis with the patient and the intended plan as seen in the above orders. The patient has received an after-visit summary and questions were answered concerning future plans. I have discussed medication side effects and warnings with the patient as well. I have reviewed the plan of care with the patient, accepted their input and they are in agreement with the treatment goals.        Margarita Magdaleno MD  October 26, 2021       This is the Subsequent Medicare Annual Wellness Exam, performed 12 months or more after the Initial AWV or the last Subsequent AWV    I have reviewed the patient's medical history in detail and updated the computerized patient record. Assessment/Plan   Education and counseling provided:  Are appropriate based on today's review and evaluation  Screening Mammography    1. Type 2 diabetes mellitus with stage 3a chronic kidney disease, without long-term current use of insulin (HCC)  -     glipiZIDE SR (GLUCOTROL XL) 10 mg CR tablet; Take 1 Tablet by mouth daily. , Normal, Disp-90 Tablet, R-0  2. Stage 3a chronic kidney disease ([facility]  3. Primary hypertension  -     amLODIPine (NORVASC) 5 mg tablet; Take 1 Tablet by mouth nightly., Normal, Disp-90 Tablet, R-0  4. Hyperlipidemia, unspecified hyperlipidemia type  5. Medicare annual wellness visit, subsequent       Depression Risk Factor Screening     3 most recent PHQ Screens 10/26/2021   Little interest or pleasure in doing things Not at all   Feeling down, depressed, irritable, or hopeless Not at all   Total Score PHQ 2 0       Alcohol Risk Screen    Do you average more than 1 drink per night or more than 7 drinks a week:  No    On any one occasion in the past three months have you have had more than 3 drinks containing alcohol:  No        Functional Ability and Level of Safety    Hearing: Hearing is good. Activities of Daily Living: The home contains: no safety equipment. Patient does total self care      Ambulation: with no difficulty     Fall Risk:  Fall Risk Assessment, last 12 mths 10/26/2021   Able to walk? Yes   Fall in past 12 months? 0   Do you feel unsteady?  0   Are you worried about falling 0      Abuse Screen:  Patient is not abused       Cognitive Screening    Has your family/caregiver stated any concerns about your memory: no     Cognitive Screening: Normal - Verbal Fluency Test    Health Maintenance Due     Health Maintenance Due   Topic Date Due    Shingrix Vaccine Age 50> (1 of 2) Never done    DTaP/Tdap/Td series (2 - Td or Tdap) 04/05/2020    Foot Exam Q1  10/15/2021       Patient Care Team   Patient Care Team:  Alberto Aguilar MD as PCP - General (Internal Medicine)  Yasmin Vill[facility]icencio [facility]  Dian Oliver MD (Internal Medicine)  Tennille Chu MD as Physician (Rheumatology)  Ирина Foreman MD as Physician (Ophthalmology)  Maury Botello MD (Gastroenterology)    History     Patient Active Problem List   Diagnosis Code    HTN (hypertension) I10    Diabetes mellitus, type 2 ([address] E11.9    IBS (irritable bowel syndrome) K58.9    ACP (advance care planning) Z71.89    History of breast cancer Z85.3    Connective tissue disorder ([address] M35.9    Severe obesity (BMI 35.0-39. 9) E66.01    Hyperlipidemia E78.5    Stage 3a chronic kidney disease (HCC) N18.31     Past Medical History:   Diagnosis Date    Angina pectoris     w/ silent ischemia    Breast cancer ([address]     Cancer ([address] 1987    breast    Chronic insomnia     Diabetes ([address]     Diabetes mellitus type II 2003    Exogenous obesity     HTN (hypertension) 4/5/2010    Hypertension     IBS (irritable bowel syndrome)     Menopause     SLE (systemic lupus erythematosus) ([address]       Past Surgical History:   Procedure Laterality Date    EGD  02/20/09    HX BREAST BIOPSY  2003    right breast neg.  HX BREAST RECONSTRUCTION  1988    left breast    HX BREAST RECONSTRUCTION Left     HX BREAST REDUCTION Right 1987    HX CHOLECYSTECTOMY  1990    HX CHOLECYSTECTOMY      HX HEART CATHETERIZATION  11/28/03    left, normal    HX MASTECTOMY  1987    left breast    HX MASTECTOMY Left     NM COLONOSCOPY FLX DX W/COLLJ SPEC WHEN PFRMD  2005 3/09    [facility] --egd capsule colon (NEG)     Current Outpatient Medications   Medication Sig Dispense Refill    glipiZIDE SR (GLUCOTROL XL) 10 mg CR tablet Take 1 Tablet by mouth daily. 90 Tablet 0    amLODIPine (NORVASC) 5 mg tablet Take 1 Tablet by mouth nightly.  90 Tablet 0    metFORMIN (GLUCOPHAGE) 1,000 mg tablet TAKE 1 TABLET BY MOUTH TWICE A DAY WITH MEALS 180 Tablet 3    carvediloL (COREG) 6.25 mg tablet TAKE 1 TABLET BY MOUTH TWICE A  Tablet 3    ramipriL (ALTACE) 10 mg capsule TAKE 1 CAPSULE BY MOUTH EVERY DAY 90 Cap 2    lancets (One Touch Delica) 33 gauge misc USE ONCE DAILY AS DIRECTED 100 Lancet 3    glucose blood VI test strips (OneTouch Verio test strips) strip USE ONCE DAILY AS DIRECTED 100 Strip 5    Blood-Glucose Meter (ONETOUCH VERIO FLEX) misc Use once daily 1 Each 0    hydroxychloroquine (PLAQUENIL) 200 mg tablet Take 200 mg by mouth daily.  aspirin delayed-release 81 mg tablet Take  by mouth daily.  colesevelam (WELCHOL) 625 mg tablet Take 1,875 mg by mouth two (2) times daily (with meals).        Allergies   Allergen Reactions    Latex Hives    Atorvastatin Nausea Only     Patient declined      Metformin Diarrhea    Penicillins Hives       Family History   Problem Relation Age of Onset    Diabetes Mother     Heart Disease Father     Heart Attack Father     Breast Cancer Paternal Aunt     Diabetes Brother     Asthma Brother      Social History     Tobacco Use    Smoking status: Never Smoker    Smokeless tobacco: Never Used   Substance Use Topics    Alcohol use: No         Marianna Santillan MD

## 2021-10-27 RX ORDER — ROSUVASTATIN CALCIUM 5 MG/1
5 TABLET, COATED ORAL DAILY
Qty: 90 TABLET | Refills: 1 | Status: SHIPPED | OUTPATIENT
Start: 2021-10-27 | End: 2022-04-26 | Stop reason: SDUPTHER

## 2021-10-27 NOTE — TELEPHONE ENCOUNTER
Attempt made to contact patient regarding statin medication. Left message to return call.     Ashlie Moreland, PharmD, [facility], [facility] Pharmacist  Department, toll free: [phone], option 2

## 2021-10-27 NOTE — TELEPHONE ENCOUNTER
Sandro Nuno MD - patient agreeable to starting rosuvastatin 5mg daily. States she'll schedule f/u with new PCP for @3 months. Rx pended for your signature/modification.     Thank you,  Rebecca Lama, PharmD, [address] [facility]  Department, toll free: [phone], option 2

## 2021-10-27 NOTE — TELEPHONE ENCOUNTER
Patient returning call. Discussed statin benefit and risk. Patient agreeable to starting rosuvastatin 5mg daily, [facility]. Discussed ADR and likely lab follow-up/monitoring - states she plans to schedule PCP f/u for @3 months.

## 2021-10-28 NOTE — TELEPHONE ENCOUNTER
Noted [facility], thank you! [facility]oke to [facility] - confirm 90-ds rosuvastatin ready for patient to ; billed to Pauline, cost $1.48 to patient. Left message advising patient the rx we spoke about was ordered by her PCP to her pharmacy and is ready to . Left our contact information if any questions.     Osorio Renteria, PharmD, [facility], toll free: [phone], option 2

## 2021-11-01 NOTE — TELEPHONE ENCOUNTER
Per [facility], rosuvastatin was picked up 10/28/21. No further outreach planned at this time.     Ina Eden, PharmD, [facility], [address] [facility] Pharmacist  Department, toll free: [phone], option 2    For Ramos Bravoolas in place: No   Recommendation Provided To: Provider: 1 via Note to Provider  and Patient/Caregiver: 1 via Telephone   Intervention Detail: New Rx: 1, reason: Needs Additional Therapy   Gap Closed?: Yes   Intervention Accepted By: Provider: 1 and Patient/Caregiver: 1   Time Spent (min): 20

## 2021-11-08 ENCOUNTER — TELEPHONE (OUTPATIENT)
Dept: FAMILY MEDICINE CLINIC | Age: 71
End: 2021-11-08

## 2021-11-08 NOTE — TELEPHONE ENCOUNTER
----- Message from Rena Sal sent at 11/5/2021 11:14 AM EDT -----  Regarding: new pt  Would like to see Dr. João Arreola former patient of Dr. Calvin Duff , her doctor told her to call

## 2021-12-06 DIAGNOSIS — E11.9 TYPE 2 DIABETES MELLITUS WITHOUT COMPLICATION, WITHOUT LONG-TERM CURRENT USE OF INSULIN (HCC): ICD-10-CM

## 2021-12-06 DIAGNOSIS — I10 PRIMARY HYPERTENSION: ICD-10-CM

## 2021-12-06 RX ORDER — AMLODIPINE BESYLATE 5 MG/1
TABLET ORAL
Qty: 90 TABLET | Refills: 2 | Status: SHIPPED | OUTPATIENT
Start: 2021-12-06 | End: 2022-10-26

## 2021-12-06 RX ORDER — RAMIPRIL 10 MG/1
CAPSULE ORAL
Qty: 90 CAPSULE | Refills: 2 | Status: SHIPPED | OUTPATIENT
Start: 2021-12-06 | End: 2022-10-18

## 2021-12-06 RX ORDER — BLOOD SUGAR DIAGNOSTIC
STRIP MISCELLANEOUS
Qty: 100 STRIP | Refills: 5 | Status: SHIPPED | OUTPATIENT
Start: 2021-12-06

## 2021-12-06 RX ORDER — LANCETS 33 GAUGE
EACH MISCELLANEOUS
Qty: 100 LANCET | Refills: 3 | Status: SHIPPED | OUTPATIENT
Start: 2021-12-06 | End: 2022-01-26 | Stop reason: SDUPTHER

## 2021-12-06 NOTE — TELEPHONE ENCOUNTER
Patient request a refill on the following prescription. Please advise.     Requested Prescriptions     Pending Prescriptions Disp Refills    lancets (One Touch Delica) 33 gauge misc 639 Lancet 3     Sig: USE ONCE DAILY AS DIRECTED

## 2022-01-26 ENCOUNTER — OFFICE VISIT (OUTPATIENT)
Dept: FAMILY MEDICINE CLINIC | Age: 72
End: 2022-01-26
Payer: MEDICARE

## 2022-01-26 ENCOUNTER — APPOINTMENT (OUTPATIENT)
Dept: FAMILY MEDICINE CLINIC | Age: 72
End: 2022-01-26

## 2022-01-26 ENCOUNTER — HOSPITAL ENCOUNTER (OUTPATIENT)
Dept: LAB | Age: 72
Discharge: HOME | End: 2022-01-26
Payer: MEDICARE

## 2022-01-26 VITALS
DIASTOLIC BLOOD PRESSURE: 70 MMHG | WEIGHT: 196 LBS | HEART RATE: 89 BPM | OXYGEN SATURATION: 98 % | SYSTOLIC BLOOD PRESSURE: 130 MMHG | RESPIRATION RATE: 15 BRPM | TEMPERATURE: 97.4 F | BODY MASS INDEX: 36.07 KG/M2 | HEIGHT: 62 IN

## 2022-01-26 DIAGNOSIS — M35.9 CONNECTIVE TISSUE DISORDER (HCC): ICD-10-CM

## 2022-01-26 DIAGNOSIS — E11.22 TYPE 2 DIABETES MELLITUS WITH STAGE 3A CHRONIC KIDNEY DISEASE, WITHOUT LONG-TERM CURRENT USE OF INSULIN (HCC): ICD-10-CM

## 2022-01-26 DIAGNOSIS — E11.9 TYPE 2 DIABETES MELLITUS WITHOUT COMPLICATION, WITHOUT LONG-TERM CURRENT USE OF INSULIN (HCC): ICD-10-CM

## 2022-01-26 DIAGNOSIS — E66.01 SEVERE OBESITY (BMI 35.0-35.9 WITH COMORBIDITY) (HCC): ICD-10-CM

## 2022-01-26 DIAGNOSIS — E11.9 TYPE 2 DIABETES MELLITUS WITHOUT COMPLICATION, WITHOUT LONG-TERM CURRENT USE OF INSULIN (HCC): Primary | ICD-10-CM

## 2022-01-26 DIAGNOSIS — Z76.89 ESTABLISHING CARE WITH NEW DOCTOR, ENCOUNTER FOR: ICD-10-CM

## 2022-01-26 DIAGNOSIS — N18.31 TYPE 2 DIABETES MELLITUS WITH STAGE 3A CHRONIC KIDNEY DISEASE, WITHOUT LONG-TERM CURRENT USE OF INSULIN (HCC): ICD-10-CM

## 2022-01-26 DIAGNOSIS — N18.31 STAGE 3A CHRONIC KIDNEY DISEASE (HCC): ICD-10-CM

## 2022-01-26 DIAGNOSIS — Z63.6 CAREGIVER BURDEN: ICD-10-CM

## 2022-01-26 LAB
25(OH)D3 SERPL-MCNC: 9.3 NG/ML (ref 30–100)
ALBUMIN SERPL-MCNC: 3.7 G/DL (ref 3.4–5)
ALBUMIN/GLOB SERPL: 1 {RATIO} (ref 0.8–1.7)
ALP SERPL-CCNC: 62 U/L (ref 45–117)
ALT SERPL-CCNC: 19 U/L (ref 13–56)
ANION GAP SERPL CALC-SCNC: 5 MMOL/L (ref 3–18)
AST SERPL-CCNC: 5 U/L (ref 10–38)
BASOPHILS # BLD: 0.1 K/UL (ref 0–0.1)
BASOPHILS NFR BLD: 1 % (ref 0–2)
BILIRUB SERPL-MCNC: 0.5 MG/DL (ref 0.2–1)
BUN SERPL-MCNC: 21 MG/DL (ref 7–18)
BUN/CREAT SERPL: 18 (ref 12–20)
CALCIUM SERPL-MCNC: 9.1 MG/DL (ref 8.5–10.1)
CHLORIDE SERPL-SCNC: 107 MMOL/L (ref 100–111)
CHOLEST SERPL-MCNC: 119 MG/DL
CO2 SERPL-SCNC: 28 MMOL/L (ref 21–32)
CREAT SERPL-MCNC: 1.17 MG/DL (ref 0.6–1.3)
CREAT UR-MCNC: 60 MG/DL (ref 30–125)
DIFFERENTIAL METHOD BLD: ABNORMAL
EOSINOPHIL # BLD: 0.3 K/UL (ref 0–0.4)
EOSINOPHIL NFR BLD: 4 % (ref 0–5)
ERYTHROCYTE [DISTWIDTH] IN BLOOD BY AUTOMATED COUNT: 14.9 % (ref 11.6–14.5)
EST. AVERAGE GLUCOSE BLD GHB EST-MCNC: 146 MG/DL
GLOBULIN SER CALC-MCNC: 3.7 G/DL (ref 2–4)
GLUCOSE SERPL-MCNC: 127 MG/DL (ref 74–99)
HBA1C MFR BLD: 6.7 % (ref 4.2–5.6)
HCT VFR BLD AUTO: 37.9 % (ref 35–45)
HDLC SERPL-MCNC: 75 MG/DL (ref 40–60)
HDLC SERPL: 1.6 {RATIO} (ref 0–5)
HGB BLD-MCNC: 11.1 G/DL (ref 12–16)
IMM GRANULOCYTES # BLD AUTO: 0 K/UL (ref 0–0.04)
IMM GRANULOCYTES NFR BLD AUTO: 0 % (ref 0–0.5)
LDLC SERPL CALC-MCNC: 26.2 MG/DL (ref 0–100)
LIPID PROFILE,FLP: ABNORMAL
LYMPHOCYTES # BLD: 1.6 K/UL (ref 0.9–3.6)
LYMPHOCYTES NFR BLD: 24 % (ref 21–52)
MCH RBC QN AUTO: 23.9 PG (ref 24–34)
MCHC RBC AUTO-ENTMCNC: 29.3 G/DL (ref 31–37)
MCV RBC AUTO: 81.7 FL (ref 78–100)
MICROALBUMIN UR-MCNC: 1.21 MG/DL (ref 0–3)
MICROALBUMIN/CREAT UR-RTO: 20 MG/G (ref 0–30)
MONOCYTES # BLD: 0.7 K/UL (ref 0.05–1.2)
MONOCYTES NFR BLD: 10 % (ref 3–10)
NEUTS SEG # BLD: 4.1 K/UL (ref 1.8–8)
NEUTS SEG NFR BLD: 61 % (ref 40–73)
NRBC # BLD: 0 K/UL (ref 0–0.01)
NRBC BLD-RTO: 0 PER 100 WBC
PLATELET # BLD AUTO: 340 K/UL (ref 135–420)
PMV BLD AUTO: 9.9 FL (ref 9.2–11.8)
POTASSIUM SERPL-SCNC: 4.7 MMOL/L (ref 3.5–5.5)
PROT SERPL-MCNC: 7.4 G/DL (ref 6.4–8.2)
RBC # BLD AUTO: 4.64 M/UL (ref 4.2–5.3)
SODIUM SERPL-SCNC: 140 MMOL/L (ref 136–145)
T4 FREE SERPL-MCNC: 1.3 NG/DL (ref 0.7–1.5)
TRIGL SERPL-MCNC: 89 MG/DL (ref ?–150)
TSH SERPL DL<=0.05 MIU/L-ACNC: 0.99 UIU/ML (ref 0.36–3.74)
VLDLC SERPL CALC-MCNC: 17.8 MG/DL
WBC # BLD AUTO: 6.7 K/UL (ref 4.6–13.2)

## 2022-01-26 PROCEDURE — 83036 HEMOGLOBIN GLYCOSYLATED A1C: CPT

## 2022-01-26 PROCEDURE — 1101F PT FALLS ASSESS-DOCD LE1/YR: CPT | Performed by: STUDENT IN AN ORGANIZED HEALTH CARE EDUCATION/TRAINING PROGRAM

## 2022-01-26 PROCEDURE — G8752 SYS BP LESS 140: HCPCS | Performed by: STUDENT IN AN ORGANIZED HEALTH CARE EDUCATION/TRAINING PROGRAM

## 2022-01-26 PROCEDURE — 84443 ASSAY THYROID STIM HORMONE: CPT

## 2022-01-26 PROCEDURE — 80053 COMPREHEN METABOLIC PANEL: CPT

## 2022-01-26 PROCEDURE — 1090F PRES/ABSN URINE INCON ASSESS: CPT | Performed by: STUDENT IN AN ORGANIZED HEALTH CARE EDUCATION/TRAINING PROGRAM

## 2022-01-26 PROCEDURE — 99214 OFFICE O/P EST MOD 30 MIN: CPT | Performed by: STUDENT IN AN ORGANIZED HEALTH CARE EDUCATION/TRAINING PROGRAM

## 2022-01-26 PROCEDURE — G8417 CALC BMI ABV UP PARAM F/U: HCPCS | Performed by: STUDENT IN AN ORGANIZED HEALTH CARE EDUCATION/TRAINING PROGRAM

## 2022-01-26 PROCEDURE — 82043 UR ALBUMIN QUANTITATIVE: CPT

## 2022-01-26 PROCEDURE — G8754 DIAS BP LESS 90: HCPCS | Performed by: STUDENT IN AN ORGANIZED HEALTH CARE EDUCATION/TRAINING PROGRAM

## 2022-01-26 PROCEDURE — 3044F HG A1C LEVEL LT 7.0%: CPT | Performed by: STUDENT IN AN ORGANIZED HEALTH CARE EDUCATION/TRAINING PROGRAM

## 2022-01-26 PROCEDURE — 84439 ASSAY OF FREE THYROXINE: CPT

## 2022-01-26 PROCEDURE — 36415 COLL VENOUS BLD VENIPUNCTURE: CPT

## 2022-01-26 PROCEDURE — 80061 LIPID PANEL: CPT

## 2022-01-26 PROCEDURE — 85025 COMPLETE CBC W/AUTO DIFF WBC: CPT

## 2022-01-26 PROCEDURE — 3017F COLORECTAL CA SCREEN DOC REV: CPT | Performed by: STUDENT IN AN ORGANIZED HEALTH CARE EDUCATION/TRAINING PROGRAM

## 2022-01-26 PROCEDURE — 2022F DILAT RTA XM EVC RTNOPTHY: CPT | Performed by: STUDENT IN AN ORGANIZED HEALTH CARE EDUCATION/TRAINING PROGRAM

## 2022-01-26 PROCEDURE — G8427 DOCREV CUR MEDS BY ELIG CLIN: HCPCS | Performed by: STUDENT IN AN ORGANIZED HEALTH CARE EDUCATION/TRAINING PROGRAM

## 2022-01-26 PROCEDURE — G8510 SCR DEP NEG, NO PLAN REQD: HCPCS | Performed by: STUDENT IN AN ORGANIZED HEALTH CARE EDUCATION/TRAINING PROGRAM

## 2022-01-26 PROCEDURE — 82306 VITAMIN D 25 HYDROXY: CPT

## 2022-01-26 PROCEDURE — G8399 PT W/DXA RESULTS DOCUMENT: HCPCS | Performed by: STUDENT IN AN ORGANIZED HEALTH CARE EDUCATION/TRAINING PROGRAM

## 2022-01-26 PROCEDURE — G8536 NO DOC ELDER MAL SCRN: HCPCS | Performed by: STUDENT IN AN ORGANIZED HEALTH CARE EDUCATION/TRAINING PROGRAM

## 2022-01-26 PROCEDURE — G9899 SCRN MAM PERF RSLTS DOC: HCPCS | Performed by: STUDENT IN AN ORGANIZED HEALTH CARE EDUCATION/TRAINING PROGRAM

## 2022-01-26 RX ORDER — LANCETS 33 GAUGE
EACH MISCELLANEOUS
Qty: 100 LANCET | Refills: 11 | Status: SHIPPED | OUTPATIENT
Start: 2022-01-26

## 2022-01-26 SDOH — SOCIAL STABILITY - SOCIAL INSECURITY: DEPENDENT RELATIVE NEEDING CARE AT HOME: Z63.6

## 2022-01-26 NOTE — PROGRESS NOTES
Nakul Whitney is a 73 y.o. female (: 1950) presenting to address:    Chief Complaint   Patient presents with    Transfer Of Care       Vitals:    22 0706   BP: 130/70   Pulse: 89   Resp: 15   Temp: 97.4 °F (36.3 °C)   TempSrc: Temporal   SpO2: 98%   Weight: 196 lb (88.9 kg)   Height: 5' 2\" (1.575 m)   PainSc:   0 - No pain       Hearing/Vision:   No exam data present    Learning Assessment:     Learning Assessment 2019   PRIMARY LEARNER Patient   HIGHEST LEVEL OF EDUCATION - PRIMARY LEARNER  GRADUATED HIGH SCHOOL OR GED   BARRIERS PRIMARY LEARNER NONE   CO-LEARNER CAREGIVER No   PRIMARY LANGUAGE ENGLISH   LEARNER PREFERENCE PRIMARY LISTENING   ANSWERED BY patient   RELATIONSHIP SELF     Depression Screening:     3 most recent PHQ Screens 2022   Little interest or pleasure in doing things Not at all   Feeling down, depressed, irritable, or hopeless Not at all   Total Score PHQ 2 0     Fall Risk Assessment:     Fall Risk Assessment, last 12 mths 2022   Able to walk? Yes   Fall in past 12 months? 0   Do you feel unsteady? 0   Are you worried about falling 0     Abuse Screening:     Abuse Screening Questionnaire 2019   Do you ever feel afraid of your partner? N   Are you in a relationship with someone who physically or mentally threatens you? N   Is it safe for you to go home? Y     ADL Assessment:   No flowsheet data found. Coordination of Care Questionaire:   1. \"Have you been to the ER, urgent care clinic since your last visit? Hospitalized since your last visit? \" No    2. \"Have you seen or consulted any other health care providers outside of the [address] [place] [address] since your last visit? \" No     3. For patients aged 39-70: Has the patient had a colonoscopy? Yes - no Care Gap present     If the patient is female:    4. For patients aged 41-77: Has the patient had a mammogram within the past 2 years? Yes - no Care Gap present    5.  For patients aged 21-65: Has the patient had a pap smear? No    Advanced Directive:   1. Do you have an Advanced Directive? YES    2. Would you like information on Advanced Directives?  NO

## 2022-01-26 NOTE — PATIENT INSTRUCTIONS
 Can purchase Aspercreme or Volteran gel 1% (also known as Diclofenac) at the pharmacy without a prescription. It is the same kind of medication as Ibuprofen (NSAID) which works well for inflammation without the same side effects that come with oral version of the medication. It comes in a tube, usually 100g and can be used as needed for muscular or joint pain.  First choice of oral medication: You can also use Tylenol (Acetaminophen) 500-650mg every 6 hours as needed for pain.  Back up choice of oral medication:  The safest over the counter NSAID to use would be Naproxen/Aleve 500mg twice daily as needed for pain and is best tolerated when taken with food. Caution with long term regular use of NSAID (Ex. Advil/Iburprofen, Aleve/Naproxen). This medication can be upsetting to the stomach as well as contribute to increased blood pressure.  Encouraged to utilize nonpharmacologic treatments such as rest, heat/cold, stretching, and massage to affected area as well    The Big Picture: Checking Your Blood Sugar    Blood sugar (blood glucose) monitoring is the primary tool you have to find out if your blood glucose levels are within your target range. This tells you your blood glucose level at any one time. Its important for blood sugar levels to stay in a healthy range. If glucose levels get too low, we can lose the ability to think and function normally. If they get too high and stay high, it can cause damage or complications to the body over the course of many years. The logging of your results is vital. When you bring your log to your healthcare provider, youll  have a good picture of your body's response to your diabetes care plan. To help keep track of your levels, we have a printable blood glucose log. We also have a blood glucose log available for purchase that is smaller so you can carry it with you. Who should check? Talk to your doctor about whether you should be checking your blood glucose. People who may benefit from checking blood glucose regularly include those:   taking insulin.  who are pregnant.  having a hard time controlling blood sugar levels.  having low blood sugar levels.  having low blood sugar levels without the usual warning signs.  have ketones from high blood sugar levels. Other tips for checking:     With some meters, you can also use your forearm, thigh, or fleshy part of your hand.  There are spring-loaded lancing devices that make sticking yourself less painful.  If you use your fingertip, stick the side of your fingertip by your fingernail to avoid having sore spots on the frequently used part of your finger. What are the target ranges? A1C targets differ based on age and health. Also, more or less stringent glycemic goals may be appropriate for each individual. Blood glucose targets are individualized based on:   duration of diabetes   age/life expectancy   conditions a person may have   cardiovascular disease or diabetes complications   hypoglycemia unawareness   individual patient considerations    The American Diabetes Association suggests the following targets for most nonpregnant adults with diabetes:     A1C: Less than 7%.  Before a meal (preprandial plasma glucose):  mg/dL     1-2 hours after beginning of the meal (postprandial plasma glucose)*: Less than 180 mg/dL    What do my results mean? When you finish the blood sugar check, write down your results and note what factors may have affected them, such as food, activity, and stress. Take a close look at your blood glucose record to see if your level is too high or too low several days in a row at about the same time. If the same thing keeps happening, it might be time to change your diabetes care plan. Work with your doctor or diabetes educator to learn what your results mean for you. It can take time to make adjustments and get things just right.  And do ask your doctor if you should report results out of a certain range right away by phone. Keep in mind that blood glucose results often trigger strong feelings. Blood sugar numbers can leave you upset, confused, frustrated, angry, or down. It's easy to use the numbers to  yourself.  Remind yourself that tracking your blood sugar level is simply a way to know how well your diabetes care plan is working, and whether that plan may need to change. : )

## 2022-01-26 NOTE — PROGRESS NOTES
Note to patient:  The purpose of this note is to communicate optimally with the other physicians / APCs involved in your care. It is written using standard medical terminology. If you have questions regarding the details of the note, please contact my office to schedule an appointment to address your questions. Tennille Infante  Primary Care Office Visit - Problem-Oriented    : 1950   Christoph Srinivasan is a 73 y.o. female presenting for  Chief Complaint   Patient presents with    Transfer Of Care          Assessment/Plan:       ICD-10-CM ICD-9-CM   1. Type 2 diabetes mellitus without complication, without long-term current use of insulin (HCC)  E11.9 250.00   2. Type 2 diabetes mellitus with stage 3a chronic kidney disease, without long-term current use of insulin ([facility])  E11.22 250.40    N18.31 585.3   3. Connective tissue disorder ([facility])  M35.9 710.9   4. Stage 3a chronic kidney disease ([facility])  N18.31 585.3   5. Establishing care with new doctor, encounter for  Z76.89 V65.8   6. Caregiver burden  Z63.6 V61.49   7. Severe obesity (BMI 35.0-35.9 with comorbidity) ([facility])  E66.01 278.01    Z68.35 V85.35     #Caregiver stress  Involved in Yazidism and reading for self care    #HLD - unclear control, will check levels and calculate ascvd    #HTN  Has BP cuff at home    BP Readings from Last 3 Encounters:   22 130/70   10/26/21 (!) 141/70   01/15/21 111/69     Key CAD CHF Meds             ramipriL (ALTACE) 10 mg capsule (Taking) TAKE 1 CAPSULE BY MOUTH EVERY DAY    amLODIPine (NORVASC) 5 mg tablet (Taking) TAKE 1 TABLET BY MOUTH EVERY DAY AT NIGHT    rosuvastatin (CRESTOR) 5 mg tablet (Taking) Take 1 Tablet by mouth daily. carvediloL (COREG) 6.25 mg tablet (Taking) TAKE 1 TABLET BY MOUTH TWICE A DAY    aspirin delayed-release 81 mg tablet (Taking) Take  by mouth daily. [facility]) 625 mg tablet (Taking) Take 1,875 mg by mouth two (2) times daily (with meals). #NIDDM2  Occasional hypoglycemia 60, 79, 97 asymptomatic. Reviewed need for close monitoring and adjustment to regimen if persistent hypoglycemia. Provided handout on hypoglycemic symptoms and glucose goals. Tolerating medications without notable AE    Lab Results   Component Value Date/Time    Hemoglobin A1c 6.7 (H) 01/26/2022 07:39 AM    Hemoglobin A1c 6.6 (H) 10/25/2021 09:03 AM    Hemoglobin A1c 7.5 (H) 07/15/2021 08:32 AM     Key Antihyperglycemic Medications             empagliflozin (JARDIANCE) 10 mg tablet (Taking) Initial: Take 1 tablet (10 mg) once daily; may increase dose after 4 to 12 weeks if needed to achieve glycemic goals    metFORMIN (GLUCOPHAGE) 1,000 mg tablet (Taking) TAKE 1 TABLET BY MOUTH TWICE A DAY WITH MEALS        #CKD - unclear control, will check levels  Avoid nephrotoxic agents where able. #SLE - stable  On Hydroxychloroquine   Follows with Dr. Melissa Grewal     #Body mass index is 35.85 kg/m². Counseled on diet and exercise methods. Positive reinforcement provided. #  Reviewed current vaccination recommendations and would offer additional information if interested.      Orders Placed This Encounter    HEMOGLOBIN A1C WITH EAG     Standing Status:   Future     Number of Occurrences:   1     Standing Expiration Date:   1/26/2023    CBC WITH AUTOMATED DIFF     Standing Status:   Future     Number of Occurrences:   1     Standing Expiration Date:   6/44/9372    METABOLIC PANEL, COMPREHENSIVE     Standing Status:   Future     Number of Occurrences:   1     Standing Expiration Date:   1/26/2023    T4, FREE     Standing Status:   Future     Number of Occurrences:   1     Standing Expiration Date:   1/26/2023    LIPID PANEL     Standing Status:   Future     Number of Occurrences:   1     Standing Expiration Date:   1/26/2023    TSH 3RD GENERATION     Standing Status:   Future     Number of Occurrences:   1     Standing Expiration Date:   1/26/2023    VITAMIN D, 25 HYDROXY Standing Status:   Future     Number of Occurrences:   1     Standing Expiration Date:   1/26/2023    MICROALBUMIN, UR, RAND W/ MICROALB/CREAT RATIO     Standing Status:   Future     Number of Occurrences:   1     Standing Expiration Date:   1/27/2023    lancets (One Touch Delica) 33 gauge misc     Sig: USE ONCE DAILY AS DIRECTED     Dispense:  100 Lancet     Refill:  11    empagliflozin (JARDIANCE) 10 mg tablet     Sig: Initial: Take 1 tablet (10 mg) once daily; may increase dose after 4 to 12 weeks if needed to achieve glycemic goals     Dispense:  30 Tablet     Refill:  1     May increase next Rx to 25mg once daily       Follow-up and Dispositions    · Return in about 3 months (around 4/26/2022) for to reevaluate and review labs in detail . Reviewed management plan & instructions with patient, who voiced understanding. Subjective   History:   Glenis Tanner is a 73 y.o. female presenting to address:  Chief Complaint   Patient presents with   [employer] Transfer Of Care     Extensive review of medical history and medications completed to facilitate transfer of care. Review of Systems:     A comprehensive review of systems was negative except for that written in the HPI and A/P         Objective     Physical Assessment:     Visit Vitals  /70 (BP 1 Location: Left upper arm, BP Patient Position: Sitting, BP Cuff Size: Large adult)   Pulse 89   Temp 97.4 °F (36.3 °C) (Temporal)   Resp 15   Ht 5' 2\" (1.575 m)   Wt 196 lb (88.9 kg)   SpO2 98%   BMI 35.85 kg/m²       Physical Exam  Vitals and nursing note reviewed. Constitutional:       General: She is not in acute distress. Cardiovascular:      Rate and Rhythm: Normal rate and regular rhythm. Pulses: Normal pulses. Pulmonary:      Effort: Pulmonary effort is normal. No respiratory distress. Musculoskeletal:      Right lower leg: No edema. Left lower leg: No edema.    Neurological:      Mental Status: She is alert and oriented to person, place, and time. Gait: Gait normal.   Psychiatric:         Mood and Affect: Mood normal.         Behavior: Behavior normal.         Thought Content: Thought content normal.         Judgment: Judgment normal.                 This document may have been created with the aid of dictation software. Text may contain errors, particularly phonetic errors.       Dimitry Henning DO  Family Medicine  01/26/2022

## 2022-02-15 ENCOUNTER — HOSPITAL ENCOUNTER (OUTPATIENT)
Dept: WOMENS IMAGING | Age: 72
Discharge: HOME | End: 2022-02-15
Attending: STUDENT IN AN ORGANIZED HEALTH CARE EDUCATION/TRAINING PROGRAM
Payer: MEDICARE

## 2022-02-15 DIAGNOSIS — Z12.31 ENCOUNTER FOR SCREENING MAMMOGRAM FOR BREAST CANCER: ICD-10-CM

## 2022-02-15 PROCEDURE — 77063 BREAST TOMOSYNTHESIS BI: CPT

## 2022-03-18 PROBLEM — Z85.3 HISTORY OF BREAST CANCER: Status: ACTIVE | Noted: 2017-11-20

## 2022-03-19 PROBLEM — E78.5 HYPERLIPIDEMIA: Status: ACTIVE | Noted: 2018-06-05

## 2022-03-19 PROBLEM — E11.22 TYPE 2 DIABETES MELLITUS WITH CHRONIC KIDNEY DISEASE (HCC): Status: ACTIVE | Noted: 2022-01-26

## 2022-03-19 PROBLEM — Z71.89 ACP (ADVANCE CARE PLANNING): Status: ACTIVE | Noted: 2017-11-20

## 2022-03-19 PROBLEM — M35.9 CONNECTIVE TISSUE DISORDER (HCC): Status: ACTIVE | Noted: 2018-01-12

## 2022-03-20 PROBLEM — N18.31 STAGE 3A CHRONIC KIDNEY DISEASE (HCC): Status: ACTIVE | Noted: 2021-01-15

## 2022-03-28 ENCOUNTER — TELEPHONE (OUTPATIENT)
Dept: FAMILY MEDICINE CLINIC | Age: 72
End: 2022-03-28

## 2022-03-28 NOTE — TELEPHONE ENCOUNTER
Patient states her sugar levels have been running between 130-190 for past two months since starting the Jardiance 10 mg. She is due to  another refill and just didn't know if you wanted her to continue with this medication or go back to the glipizide. She states her sugars were lower on the glipizide. After reviewing notes I do see where you stated: If you would like to increase dose please send in new rx.

## 2022-03-28 NOTE — TELEPHONE ENCOUNTER
Patient called in regards to her medication . She has been taking a new medication that was prescribed (Jardiance) and since she has start taking it her blood sugars are high . She wants to know if she should still take it and pick it up today ?  Or should she take the glipizide that she was on at first

## 2022-04-26 ENCOUNTER — OFFICE VISIT (OUTPATIENT)
Dept: FAMILY MEDICINE CLINIC | Age: 72
End: 2022-04-26
Payer: MEDICARE

## 2022-04-26 VITALS
RESPIRATION RATE: 16 BRPM | HEART RATE: 81 BPM | OXYGEN SATURATION: 100 % | TEMPERATURE: 97.5 F | WEIGHT: 185.8 LBS | BODY MASS INDEX: 34.19 KG/M2 | SYSTOLIC BLOOD PRESSURE: 117 MMHG | HEIGHT: 62 IN | DIASTOLIC BLOOD PRESSURE: 73 MMHG

## 2022-04-26 DIAGNOSIS — E11.22 TYPE 2 DIABETES MELLITUS WITH STAGE 3A CHRONIC KIDNEY DISEASE, WITHOUT LONG-TERM CURRENT USE OF INSULIN (HCC): ICD-10-CM

## 2022-04-26 DIAGNOSIS — M35.9 CONNECTIVE TISSUE DISORDER (HCC): ICD-10-CM

## 2022-04-26 DIAGNOSIS — E78.5 HYPERLIPIDEMIA, UNSPECIFIED HYPERLIPIDEMIA TYPE: Primary | ICD-10-CM

## 2022-04-26 DIAGNOSIS — N18.31 TYPE 2 DIABETES MELLITUS WITH STAGE 3A CHRONIC KIDNEY DISEASE, WITHOUT LONG-TERM CURRENT USE OF INSULIN (HCC): ICD-10-CM

## 2022-04-26 DIAGNOSIS — Z63.6 CAREGIVER BURDEN: ICD-10-CM

## 2022-04-26 DIAGNOSIS — E11.9 TYPE 2 DIABETES MELLITUS WITHOUT COMPLICATION, WITHOUT LONG-TERM CURRENT USE OF INSULIN (HCC): ICD-10-CM

## 2022-04-26 LAB — HBA1C MFR BLD HPLC: 7.4 %

## 2022-04-26 PROCEDURE — 83036 HEMOGLOBIN GLYCOSYLATED A1C: CPT | Performed by: STUDENT IN AN ORGANIZED HEALTH CARE EDUCATION/TRAINING PROGRAM

## 2022-04-26 PROCEDURE — G8536 NO DOC ELDER MAL SCRN: HCPCS | Performed by: STUDENT IN AN ORGANIZED HEALTH CARE EDUCATION/TRAINING PROGRAM

## 2022-04-26 PROCEDURE — 2022F DILAT RTA XM EVC RTNOPTHY: CPT | Performed by: STUDENT IN AN ORGANIZED HEALTH CARE EDUCATION/TRAINING PROGRAM

## 2022-04-26 PROCEDURE — 3051F HG A1C>EQUAL 7.0%<8.0%: CPT | Performed by: STUDENT IN AN ORGANIZED HEALTH CARE EDUCATION/TRAINING PROGRAM

## 2022-04-26 PROCEDURE — G8752 SYS BP LESS 140: HCPCS | Performed by: STUDENT IN AN ORGANIZED HEALTH CARE EDUCATION/TRAINING PROGRAM

## 2022-04-26 PROCEDURE — 3017F COLORECTAL CA SCREEN DOC REV: CPT | Performed by: STUDENT IN AN ORGANIZED HEALTH CARE EDUCATION/TRAINING PROGRAM

## 2022-04-26 PROCEDURE — G8510 SCR DEP NEG, NO PLAN REQD: HCPCS | Performed by: STUDENT IN AN ORGANIZED HEALTH CARE EDUCATION/TRAINING PROGRAM

## 2022-04-26 PROCEDURE — G8399 PT W/DXA RESULTS DOCUMENT: HCPCS | Performed by: STUDENT IN AN ORGANIZED HEALTH CARE EDUCATION/TRAINING PROGRAM

## 2022-04-26 PROCEDURE — G8427 DOCREV CUR MEDS BY ELIG CLIN: HCPCS | Performed by: STUDENT IN AN ORGANIZED HEALTH CARE EDUCATION/TRAINING PROGRAM

## 2022-04-26 PROCEDURE — G8754 DIAS BP LESS 90: HCPCS | Performed by: STUDENT IN AN ORGANIZED HEALTH CARE EDUCATION/TRAINING PROGRAM

## 2022-04-26 PROCEDURE — 1101F PT FALLS ASSESS-DOCD LE1/YR: CPT | Performed by: STUDENT IN AN ORGANIZED HEALTH CARE EDUCATION/TRAINING PROGRAM

## 2022-04-26 PROCEDURE — G9899 SCRN MAM PERF RSLTS DOC: HCPCS | Performed by: STUDENT IN AN ORGANIZED HEALTH CARE EDUCATION/TRAINING PROGRAM

## 2022-04-26 PROCEDURE — 1090F PRES/ABSN URINE INCON ASSESS: CPT | Performed by: STUDENT IN AN ORGANIZED HEALTH CARE EDUCATION/TRAINING PROGRAM

## 2022-04-26 PROCEDURE — 99214 OFFICE O/P EST MOD 30 MIN: CPT | Performed by: STUDENT IN AN ORGANIZED HEALTH CARE EDUCATION/TRAINING PROGRAM

## 2022-04-26 PROCEDURE — G8417 CALC BMI ABV UP PARAM F/U: HCPCS | Performed by: STUDENT IN AN ORGANIZED HEALTH CARE EDUCATION/TRAINING PROGRAM

## 2022-04-26 RX ORDER — ROSUVASTATIN CALCIUM 5 MG/1
5 TABLET, COATED ORAL DAILY
Qty: 90 TABLET | Refills: 1 | Status: SHIPPED | OUTPATIENT
Start: 2022-04-26 | End: 2022-09-20

## 2022-04-26 SDOH — SOCIAL STABILITY - SOCIAL INSECURITY: DEPENDENT RELATIVE NEEDING CARE AT HOME: Z63.6

## 2022-04-26 NOTE — PATIENT INSTRUCTIONS
The Big Picture: Checking Your Blood Sugar    Blood sugar (blood glucose) monitoring is the primary tool you have to find out if your blood glucose levels are within your target range. This tells you your blood glucose level at any one time. Its important for blood sugar levels to stay in a healthy range. If glucose levels get too low, we can lose the ability to think and function normally. If they get too high and stay high, it can cause damage or complications to the body over the course of many years. The logging of your results is vital. When you bring your log to your healthcare provider, youll  have a good picture of your body's response to your diabetes care plan. To help keep track of your levels, we have a printable blood glucose log. We also have a blood glucose log available for purchase that is smaller so you can carry it with you. Who should check? Talk to your doctor about whether you should be checking your blood glucose. People who may benefit from checking blood glucose regularly include those:   taking insulin.  who are pregnant.  having a hard time controlling blood sugar levels.  having low blood sugar levels.  having low blood sugar levels without the usual warning signs.  have ketones from high blood sugar levels. Other tips for checking:     With some meters, you can also use your forearm, thigh, or fleshy part of your hand.  There are spring-loaded lancing devices that make sticking yourself less painful.  If you use your fingertip, stick the side of your fingertip by your fingernail to avoid having sore spots on the frequently used part of your finger. What are the target ranges? A1C targets differ based on age and health.  Also, more or less stringent glycemic goals may be appropriate for each individual. Blood glucose targets are individualized based on:   duration of diabetes   age/life expectancy   conditions a person may have   cardiovascular disease or diabetes complications   hypoglycemia unawareness   individual patient considerations    The American Diabetes Association suggests the following targets for most nonpregnant adults with diabetes:     A1C: Less than 7%.  Before a meal (preprandial plasma glucose):  mg/dL     1-2 hours after beginning of the meal (postprandial plasma glucose)*: Less than 180 mg/dL    What do my results mean? When you finish the blood sugar check, write down your results and note what factors may have affected them, such as food, activity, and stress. Take a close look at your blood glucose record to see if your level is too high or too low several days in a row at about the same time. If the same thing keeps happening, it might be time to change your diabetes care plan. Work with your doctor or diabetes educator to learn what your results mean for you. It can take time to make adjustments and get things just right. And do ask your doctor if you should report results out of a certain range right away by phone. Keep in mind that blood glucose results often trigger strong feelings. Blood sugar numbers can leave you upset, confused, frustrated, angry, or down. It's easy to use the numbers to  yourself.  Remind yourself that tracking your blood sugar level is simply a way to know how well your diabetes care plan is working, and whether that plan may need to change. : )

## 2022-04-26 NOTE — PROGRESS NOTES
Ibeth Solorzano is a 73 y.o. female (: 1950) presenting to address:    Chief Complaint   Patient presents with    Medication Evaluation     3 month f/u    High Blood Sugar     Shoes and socks removed for foot exam.     Vitals:    22 0706   BP: 117/73   Pulse: 81   Resp: 16   Temp: 97.5 °F (36.4 °C)   TempSrc: Temporal   SpO2: 100%   Weight: 185 lb 12.8 oz (84.3 kg)   Height: 5' 2\" (1.575 m)   PainSc:   0 - No pain       Hearing/Vision:   No exam data present    Learning Assessment:     Learning Assessment 2019   PRIMARY LEARNER Patient   HIGHEST LEVEL OF EDUCATION - PRIMARY LEARNER  GRADUATED HIGH SCHOOL OR GED   BARRIERS PRIMARY LEARNER NONE   CO-LEARNER CAREGIVER No   PRIMARY LANGUAGE ENGLISH   LEARNER PREFERENCE PRIMARY LISTENING   ANSWERED BY patient   RELATIONSHIP SELF     Depression Screening:     3 most recent PHQ Screens 2022   Little interest or pleasure in doing things Not at all   Feeling down, depressed, irritable, or hopeless Not at all   Total Score PHQ 2 0     Fall Risk Assessment:     Fall Risk Assessment, last 12 mths 2022   Able to walk? Yes   Fall in past 12 months? 0   Do you feel unsteady? 0   Are you worried about falling 0     Abuse Screening:     Abuse Screening Questionnaire 2022   Do you ever feel afraid of your partner? N   Are you in a relationship with someone who physically or mentally threatens you? N   Is it safe for you to go home? Y     ADL Assessment:   No flowsheet data found. Coordination of Care Questionaire:   1. \"Have you been to the ER, urgent care clinic since your last visit? Hospitalized since your last visit? \" No    2. \"Have you seen or consulted any other health care providers outside of the [address] [place] [address] since your last visit? \" Yes Dr. Joan Riojas Rheumatology     3. For patients aged 39-70: Has the patient had a colonoscopy / FIT/ Cologuard? Yes - no Care Gap present      If the patient is female:    4.  For patients aged 40-74: Has the patient had a mammogram within the past 2 years? Yes - no Care Gap present  See top three    5. For patients aged 21-65: Has the patient had a pap smear? NA - based on age or sex    Advanced Directive:   1. Do you have an Advanced Directive? YES    2. Would you like information on Advanced Directives?  NO

## 2022-04-26 NOTE — PROGRESS NOTES
Ibeth Solorzano (: 1950) is a 73 y.o. female, established patient, here for:    ASSESSMENT/PLAN:    ICD-10-CM ICD-9-CM   1. Hyperlipidemia, unspecified hyperlipidemia type  E78.5 272.4   2. Type 2 diabetes mellitus without complication, without long-term current use of insulin (HCC)  E11.9 250.00   3. Type 2 diabetes mellitus with stage 3a chronic kidney disease, without long-term current use of insulin (HCC)  E11.22 250.40    N18.31 585.3   4. Connective tissue disorder ([facility])  M35.9 710.9   5. Caregiver burden  Z63.6 V61.49     #Caregiver stress taking care of spouse and mother  Involved in Alevism and reading for self care     #HLD - at goal!  Tolerating medications without notable AE, will continue regimen unchanged     Lab Results   Component Value Date/Time    LDL, calculated 26.2 2022 07:39 AM     #HTN - improved! Has BP cuff at home  Tolerating medications without notable AE, will continue regimen unchanged     BP Readings from Last 3 Encounters:   22 117/73   22 130/70   10/26/21 (!) 141/70           Key CAD CHF Meds                 ramipriL (ALTACE) 10 mg capsule (Taking) TAKE 1 CAPSULE BY MOUTH EVERY DAY     amLODIPine (NORVASC) 5 mg tablet (Taking) TAKE 1 TABLET BY MOUTH EVERY DAY AT NIGHT     rosuvastatin (CRESTOR) 5 mg tablet (Taking) Take 1 Tablet by mouth daily.     carvediloL (COREG) 6.25 mg tablet (Taking) TAKE 1 TABLET BY MOUTH TWICE A DAY     aspirin delayed-release 81 mg tablet (Taking) Take  by mouth daily.     colesevelam (WELCHOL) 625 mg tablet (Taking) Take 1,875 mg by mouth two (2) times daily (with meals).        #NIDDM2 - POC A1C 7.4 today  Denies any additional hypoglycemia with adjustment to regimen. Provided handout on hypoglycemic symptoms and glucose goals. Patient provided morning fasting sugar levels, slight increase in average but no continuing low values.  Provided handout on glycemic goals  Tolerating medications without notable AE, will continue regimen unchanged     Lab Results   Component Value Date/Time    Hemoglobin A1c 6.7 (H) 01/26/2022 07:39 AM    Hemoglobin A1c 6.6 (H) 10/25/2021 09:03 AM    Hemoglobin A1c 7.5 (H) 07/15/2021 08:32 AM     Key Antihyperglycemic Medications             empagliflozin (Jardiance) 25 mg tablet (Taking) Take 1 Tablet by mouth daily. Indications: type 2 diabetes mellitus    metFORMIN (GLUCOPHAGE) 1,000 mg tablet (Taking) TAKE 1 TABLET BY MOUTH TWICE A DAY WITH MEALS        #CKD 3a - stable  Avoid nephrotoxic agents where able. Lab Results   Component Value Date/Time    GFR est AA 56 (L) 01/26/2022 07:39 AM    GFR est non-AA 46 (L) 01/26/2022 07:39 AM    Creatinine 1.17 01/26/2022 07:39 AM    BUN 21 (H) 01/26/2022 07:39 AM    Sodium 140 01/26/2022 07:39 AM    Potassium 4.7 01/26/2022 07:39 AM    Chloride 107 01/26/2022 07:39 AM    CO2 28 01/26/2022 07:39 AM     #SLE - stable  On Hydroxychloroquine   Follows with Dr. Praveena Elkins      #Body mass index is down to 33! Commended on success with weight loss! Counseled on diet and exercise methods. Positive reinforcement provided. Wt Readings from Last 3 Encounters:   04/26/22 185 lb 12.8 oz (84.3 kg)   01/26/22 196 lb (88.9 kg)   10/26/21 191 lb (86.6 kg)     #  Reviewed current vaccination recommendations and would offer additional information if interested. Mammo 2/4/22 - Birad 1, density B    Orders Placed This Encounter    METABOLIC PANEL, BASIC     Standing Status:   Future     Standing Expiration Date:   4/27/2023    HEMOGLOBIN A1C WITH EAG     Standing Status:   Future     Standing Expiration Date:   4/26/2023    AMB POC HEMOGLOBIN A1C     DIABETES FOOT EXAM    rosuvastatin (CRESTOR) 5 mg tablet     Sig: Take 1 Tablet by mouth daily. Indications: high cholesterol     Dispense:  90 Tablet     Refill:  1       Return in about 6 months (around 10/26/2022) for A1C check, 30 min follow up, labs 1 week prior.     SUBJECTIVE/OBJECTIVE:  Last PCP visit: 1/26/2022    Since last visit:   Any changes in health, procedures, hospital visits, or specialist visits? Denies  Tolerating medications without adverse reactions? Reports good compliance with Rx without notable adverse effects. Current Outpatient Medications   Medication Sig    rosuvastatin (CRESTOR) 5 mg tablet Take 1 Tablet by mouth daily. Indications: high cholesterol    empagliflozin (Jardiance) 25 mg tablet Take 1 Tablet by mouth daily. Indications: type 2 diabetes mellitus    lancets (One Touch Delica) 33 gauge misc USE ONCE DAILY AS DIRECTED    ramipriL (ALTACE) 10 mg capsule TAKE 1 CAPSULE BY MOUTH EVERY DAY    amLODIPine (NORVASC) 5 mg tablet TAKE 1 TABLET BY MOUTH EVERY DAY AT NIGHT    OneTouch Verio test strips strip USE ONCE DAILY AS DIRECTED    metFORMIN (GLUCOPHAGE) 1,000 mg tablet TAKE 1 TABLET BY MOUTH TWICE A DAY WITH MEALS    carvediloL (COREG) 6.25 mg tablet TAKE 1 TABLET BY MOUTH TWICE A DAY    Blood-Glucose Meter (ONETOUCH VERIO FLEX) misc Use once daily    hydroxychloroquine (PLAQUENIL) 200 mg tablet Take 200 mg by mouth daily.  aspirin delayed-release 81 mg tablet Take  by mouth daily.  colesevelam (WELCHOL) 625 mg tablet Take 1,250 mg by mouth two (2) times daily (with meals). No current facility-administered medications for this visit. Visit Vitals  /73 (BP 1 Location: Right arm, BP Patient Position: Sitting, BP Cuff Size: Large adult)   Pulse 81   Temp 97.5 °F (36.4 °C) (Temporal)   Resp 16   Ht 5' 2\" (1.575 m)   Wt 185 lb 12.8 oz (84.3 kg)   SpO2 100%   BMI 33.98 kg/m²     Physical Exam  Vitals and nursing note reviewed. Constitutional:       General: She is not in acute distress. Cardiovascular:      Rate and Rhythm: Normal rate and regular rhythm. Pulses: Normal pulses. Pulmonary:      Effort: Pulmonary effort is normal. No respiratory distress. Musculoskeletal:      Right lower leg: No edema. Left lower leg: No edema.    Neurological:      Mental Status: She is alert and oriented to person, place, and time. Gait: Gait normal.   Psychiatric:         Mood and Affect: Mood normal.         Behavior: Behavior normal.         Thought Content:  Thought content normal.         Judgment: Judgment normal.             Radha Higuera DO  Family Medicine  04/26/2022

## 2022-10-16 NOTE — PROGRESS NOTES
Nigel Rodríguez (: 1950) is a 73 y.o. female, ESTABLISHED patient, here for FOLLOW UP:    ICD-10-CM ICD-9-CM   1. Type 2 diabetes mellitus with stage 3a chronic kidney disease, without long-term current use of insulin ([facility])  E11.22 250.40    N18.31 585.3   2. Primary hypertension  I10 401.9   3. Essential hypertension  I10 401.9   4. Hyperlipidemia, unspecified hyperlipidemia type  E78.5 272.4   5. Stage 3a chronic kidney disease (HCC)  N18.31 585.3   6. Severe obesity (BMI 35.0-35.9 with comorbidity) ([facility])  E66.01 278.01    Z68.35 V85.35   7. Needs flu shot  Z23 V04.81   8. Grief  F43.21 309.0     Assessment   Plan     #Flu shot needed  Flu shot administered in office without adverse reaction    #Caregiver stress taking care of spouse   #Grief - Mother passed away in hospice in 2022  Involved in Yazdanism and reading for self care     #HLD - Goal LDL <70  Tolerating medications without notable AE, will continue regimen unchanged           Lab Results   Component Value Date/Time     LDL, calculated 26.2 2022 07:39 AM      #HTN - Goal <140/90  Has BP cuff at home  Tolerating medications without notable AE. Due to continued success with weight loss and in an effort to reduce pill burden, agreeable to DC amlodipine and monitor      BP Readings from Last 3 Encounters:   10/26/22 128/79   22 117/73   22 130/70     Key CAD CHF Meds               ramipriL (ALTACE) 10 mg capsule (Taking) Take 1 Capsule by mouth daily. Indications: high blood pressure, kidney protection    carvediloL (COREG) 6.25 mg tablet (Taking) Take 1 Tablet by mouth two (2) times a day. Indications: high blood pressure    rosuvastatin (CRESTOR) 5 mg tablet (Taking) TAKE 1 TABLET BY MOUTH DAILY. INDICATIONS: HIGH CHOLESTEROL    aspirin delayed-release 81 mg tablet (Taking) Take  by mouth daily. colesevelam (WELCHOL) 625 mg tablet (Taking) Take 1,250 mg by mouth two (2) times daily (with meals).           #NIDDM2 Goal A1C <7  Denies any additional hypoglycemia with adjustment to regimen. Provided handout on hypoglycemic symptoms and glucose goals. Patient provided morning fasting sugar levels, slight increase in average but no continuing low values. Provided handout on glycemic goals  Tolerating medications without notable AE. Interested in starting [address]. If tolerating medicine well, will likely lower or DC Metformin after reaching higher dosing. Last Point of Care HGB A1C  Hemoglobin A1c (POC)   Date Value Ref Range Status   10/26/2022 7.1 % Final       Key Antihyperglycemic Medications               semaglutide (RYBELSUS) 3 mg tablet (Taking) Take 1 Tablet by mouth Daily (before breakfast). With small sip of water and wait 30 min before taking other meds. Dose should be increased after 30 days. Indications: type 2 diabetes mellitus    metFORMIN (GLUCOPHAGE) 1,000 mg tablet (Taking) Take 1 Tablet by mouth two (2) times daily (with meals). Indications: type 2 diabetes mellitus    Jardiance 25 mg tablet (Taking) TAKE 1 TABLET BY MOUTH DAILY. INDICATIONS: TYPE 2 DIABETES MELLITUS          #CKD 3a - stable  Avoid nephrotoxic agents where able. Lab Results   Component Value Date/Time     GFR est AA 56 (L) 01/26/2022 07:39 AM     GFR est non-AA 46 (L) 01/26/2022 07:39 AM     Creatinine 1.17 01/26/2022 07:39 AM     BUN 21 (H) 01/26/2022 07:39 AM     Sodium 140 01/26/2022 07:39 AM     Potassium 4.7 01/26/2022 07:39 AM     Chloride 107 01/26/2022 07:39 AM     CO2 28 01/26/2022 07:39 AM      #SLE - stable  On Hydroxychloroquine   Follows with Dr. Cynthia Early      #Body mass index is down to 32! Commended on success with weight loss! Counseled on diet and exercise methods. Positive reinforcement provided.    Wt Readings from Last 3 Encounters:   10/26/22 179 lb (81.2 kg)   04/26/22 185 lb 12.8 oz (84.3 kg)   01/26/22 196 lb (88.9 kg)     #HM  Reviewed current vaccination recommendations and would offer additional information if interested. Mammo 2/4/22 - Birad 1, density B          Orders Placed This Encounter    Influenza Vaccine, QUAD, 65 Yrs +  IM  (Fluad 16816 )    AMB POC HEMOGLOBIN A1C    semaglutide (RYBELSUS) 3 mg tablet     Sig: Take 1 Tablet by mouth Daily (before breakfast). With small sip of water and wait 30 min before taking other meds. Dose should be increased after 30 days. Indications: type 2 diabetes mellitus     Dispense:  30 Tablet     Refill:  0     Return in about 6 months (around 4/26/2023) for Medicare Wellness Visit, 30 min follow up, labs 1 week prior. Subjective   Last PCP visit: 4/26/2022  Since last visit:   Any changes in health, procedures, hospital visits, or specialist visits? Denies  Tolerating medications without adverse reactions? Reports good compliance with Rx without notable adverse effects. Current Outpatient Medications   Medication Instructions    aspirin delayed-release 81 mg tablet Oral, DAILY    Blood-Glucose Meter (ONETOUCH VERIO FLEX) misc Use once daily    carvediloL (COREG) 6.25 mg, Oral, 2 TIMES DAILY    colesevelam (WELCHOL) 1,250 mg, Oral, 2 TIMES DAILY WITH MEALS    hydrOXYchloroQUINE (PLAQUENIL) 200 mg, Oral, DAILY    Jardiance 25 mg tablet TAKE 1 TABLET BY MOUTH DAILY. INDICATIONS: TYPE 2 DIABETES MELLITUS    lancets (One Touch Delica) 33 gauge misc USE ONCE DAILY AS DIRECTED    metFORMIN (GLUCOPHAGE) 1,000 mg, Oral, 2 TIMES DAILY WITH MEALS    OneTouch Verio test strips strip USE ONCE DAILY AS DIRECTED    ramipriL (ALTACE) 10 mg, Oral, DAILY    rosuvastatin (CRESTOR) 5 mg, Oral, DAILY    semaglutide (RYBELSUS) 3 mg, Oral, DAILY BEFORE BREAKFAST, With small sip of water and wait 30 min before taking other meds. Dose should be increased after 30 days.       Objective   Visit Vitals  /79 (BP 1 Location: Right arm, BP Patient Position: Sitting, BP Cuff Size: Adult)   Pulse 84   Temp 97.9 °F (36.6 °C) (Temporal)   Resp 16   Ht 5' 2\" (1.575 m)   Wt 179 lb (81.2 kg)   SpO2 100%   BMI 32.74 kg/m²     Physical Exam  Vitals and nursing note reviewed. Constitutional:       General: She is not in acute distress. Cardiovascular:      Rate and Rhythm: Normal rate and regular rhythm. Pulses: Normal pulses. Pulmonary:      Effort: Pulmonary effort is normal. No respiratory distress. Musculoskeletal:      Right lower leg: No edema. Left lower leg: No edema. Neurological:      Mental Status: She is alert and oriented to person, place, and time. Gait: Gait normal.   Psychiatric:         Mood and Affect: Mood normal.         Behavior: Behavior normal.         Thought Content:  Thought content normal.         Judgment: Judgment normal.          Erlin Mccann DO  Family Medicine  10/26/2022

## 2022-10-19 ENCOUNTER — APPOINTMENT (OUTPATIENT)
Dept: FAMILY MEDICINE CLINIC | Age: 72
End: 2022-10-19

## 2022-10-19 ENCOUNTER — HOSPITAL ENCOUNTER (OUTPATIENT)
Dept: LAB | Age: 72
Discharge: HOME | End: 2022-10-19
Payer: MEDICARE

## 2022-10-19 DIAGNOSIS — E11.22 TYPE 2 DIABETES MELLITUS WITH STAGE 3A CHRONIC KIDNEY DISEASE, WITHOUT LONG-TERM CURRENT USE OF INSULIN (HCC): ICD-10-CM

## 2022-10-19 DIAGNOSIS — E11.9 TYPE 2 DIABETES MELLITUS WITHOUT COMPLICATION, WITHOUT LONG-TERM CURRENT USE OF INSULIN (HCC): ICD-10-CM

## 2022-10-19 DIAGNOSIS — E78.5 HYPERLIPIDEMIA, UNSPECIFIED HYPERLIPIDEMIA TYPE: ICD-10-CM

## 2022-10-19 DIAGNOSIS — N18.31 TYPE 2 DIABETES MELLITUS WITH STAGE 3A CHRONIC KIDNEY DISEASE, WITHOUT LONG-TERM CURRENT USE OF INSULIN (HCC): ICD-10-CM

## 2022-10-19 LAB
ANION GAP SERPL CALC-SCNC: 7 MMOL/L (ref 3–18)
BUN SERPL-MCNC: 16 MG/DL (ref 7–18)
BUN/CREAT SERPL: 13 (ref 12–20)
CALCIUM SERPL-MCNC: 9.3 MG/DL (ref 8.5–10.1)
CHLORIDE SERPL-SCNC: 106 MMOL/L (ref 100–111)
CO2 SERPL-SCNC: 26 MMOL/L (ref 21–32)
CREAT SERPL-MCNC: 1.25 MG/DL (ref 0.6–1.3)
EST. AVERAGE GLUCOSE BLD GHB EST-MCNC: 157 MG/DL
GLUCOSE SERPL-MCNC: 150 MG/DL (ref 74–99)
HBA1C MFR BLD: 7.1 % (ref 4.2–5.6)
POTASSIUM SERPL-SCNC: 4.4 MMOL/L (ref 3.5–5.5)
SODIUM SERPL-SCNC: 139 MMOL/L (ref 136–145)

## 2022-10-19 PROCEDURE — 83036 HEMOGLOBIN GLYCOSYLATED A1C: CPT

## 2022-10-19 PROCEDURE — 36415 COLL VENOUS BLD VENIPUNCTURE: CPT

## 2022-10-19 PROCEDURE — 80048 BASIC METABOLIC PNL TOTAL CA: CPT

## 2022-10-19 NOTE — PROGRESS NOTES
Will review in detail at follow up:     Future Appointments  10/26/2022 7:00 AM    Karla Burdick,       BSMA                BS AMB    CKD3 stable   A1c near goal

## 2022-10-26 ENCOUNTER — OFFICE VISIT (OUTPATIENT)
Dept: FAMILY MEDICINE CLINIC | Age: 72
End: 2022-10-26
Payer: MEDICARE

## 2022-10-26 VITALS
HEART RATE: 84 BPM | RESPIRATION RATE: 16 BRPM | SYSTOLIC BLOOD PRESSURE: 128 MMHG | BODY MASS INDEX: 32.94 KG/M2 | HEIGHT: 62 IN | WEIGHT: 179 LBS | OXYGEN SATURATION: 100 % | DIASTOLIC BLOOD PRESSURE: 79 MMHG | TEMPERATURE: 97.9 F

## 2022-10-26 DIAGNOSIS — Z23 NEEDS FLU SHOT: ICD-10-CM

## 2022-10-26 DIAGNOSIS — E66.01 SEVERE OBESITY (BMI 35.0-35.9 WITH COMORBIDITY) (HCC): ICD-10-CM

## 2022-10-26 DIAGNOSIS — I10 ESSENTIAL HYPERTENSION: ICD-10-CM

## 2022-10-26 DIAGNOSIS — N18.31 STAGE 3A CHRONIC KIDNEY DISEASE (HCC): ICD-10-CM

## 2022-10-26 DIAGNOSIS — E11.22 TYPE 2 DIABETES MELLITUS WITH STAGE 3A CHRONIC KIDNEY DISEASE, WITHOUT LONG-TERM CURRENT USE OF INSULIN (HCC): Primary | ICD-10-CM

## 2022-10-26 DIAGNOSIS — I10 PRIMARY HYPERTENSION: ICD-10-CM

## 2022-10-26 DIAGNOSIS — F43.21 GRIEF: ICD-10-CM

## 2022-10-26 DIAGNOSIS — E78.5 HYPERLIPIDEMIA, UNSPECIFIED HYPERLIPIDEMIA TYPE: ICD-10-CM

## 2022-10-26 DIAGNOSIS — N18.31 TYPE 2 DIABETES MELLITUS WITH STAGE 3A CHRONIC KIDNEY DISEASE, WITHOUT LONG-TERM CURRENT USE OF INSULIN (HCC): Primary | ICD-10-CM

## 2022-10-26 LAB — HBA1C MFR BLD HPLC: 7.1 %

## 2022-10-26 PROCEDURE — G8399 PT W/DXA RESULTS DOCUMENT: HCPCS | Performed by: STUDENT IN AN ORGANIZED HEALTH CARE EDUCATION/TRAINING PROGRAM

## 2022-10-26 PROCEDURE — G8417 CALC BMI ABV UP PARAM F/U: HCPCS | Performed by: STUDENT IN AN ORGANIZED HEALTH CARE EDUCATION/TRAINING PROGRAM

## 2022-10-26 PROCEDURE — 3074F SYST BP LT 130 MM HG: CPT | Performed by: STUDENT IN AN ORGANIZED HEALTH CARE EDUCATION/TRAINING PROGRAM

## 2022-10-26 PROCEDURE — 1123F ACP DISCUSS/DSCN MKR DOCD: CPT | Performed by: STUDENT IN AN ORGANIZED HEALTH CARE EDUCATION/TRAINING PROGRAM

## 2022-10-26 PROCEDURE — G8536 NO DOC ELDER MAL SCRN: HCPCS | Performed by: STUDENT IN AN ORGANIZED HEALTH CARE EDUCATION/TRAINING PROGRAM

## 2022-10-26 PROCEDURE — 2022F DILAT RTA XM EVC RTNOPTHY: CPT | Performed by: STUDENT IN AN ORGANIZED HEALTH CARE EDUCATION/TRAINING PROGRAM

## 2022-10-26 PROCEDURE — G8754 DIAS BP LESS 90: HCPCS | Performed by: STUDENT IN AN ORGANIZED HEALTH CARE EDUCATION/TRAINING PROGRAM

## 2022-10-26 PROCEDURE — 3078F DIAST BP <80 MM HG: CPT | Performed by: STUDENT IN AN ORGANIZED HEALTH CARE EDUCATION/TRAINING PROGRAM

## 2022-10-26 PROCEDURE — G0008 ADMIN INFLUENZA VIRUS VAC: HCPCS | Performed by: STUDENT IN AN ORGANIZED HEALTH CARE EDUCATION/TRAINING PROGRAM

## 2022-10-26 PROCEDURE — 99214 OFFICE O/P EST MOD 30 MIN: CPT | Performed by: STUDENT IN AN ORGANIZED HEALTH CARE EDUCATION/TRAINING PROGRAM

## 2022-10-26 PROCEDURE — G9899 SCRN MAM PERF RSLTS DOC: HCPCS | Performed by: STUDENT IN AN ORGANIZED HEALTH CARE EDUCATION/TRAINING PROGRAM

## 2022-10-26 PROCEDURE — G8752 SYS BP LESS 140: HCPCS | Performed by: STUDENT IN AN ORGANIZED HEALTH CARE EDUCATION/TRAINING PROGRAM

## 2022-10-26 PROCEDURE — 1090F PRES/ABSN URINE INCON ASSESS: CPT | Performed by: STUDENT IN AN ORGANIZED HEALTH CARE EDUCATION/TRAINING PROGRAM

## 2022-10-26 PROCEDURE — 3017F COLORECTAL CA SCREEN DOC REV: CPT | Performed by: STUDENT IN AN ORGANIZED HEALTH CARE EDUCATION/TRAINING PROGRAM

## 2022-10-26 PROCEDURE — 1101F PT FALLS ASSESS-DOCD LE1/YR: CPT | Performed by: STUDENT IN AN ORGANIZED HEALTH CARE EDUCATION/TRAINING PROGRAM

## 2022-10-26 PROCEDURE — 3051F HG A1C>EQUAL 7.0%<8.0%: CPT | Performed by: STUDENT IN AN ORGANIZED HEALTH CARE EDUCATION/TRAINING PROGRAM

## 2022-10-26 PROCEDURE — G8427 DOCREV CUR MEDS BY ELIG CLIN: HCPCS | Performed by: STUDENT IN AN ORGANIZED HEALTH CARE EDUCATION/TRAINING PROGRAM

## 2022-10-26 PROCEDURE — G8510 SCR DEP NEG, NO PLAN REQD: HCPCS | Performed by: STUDENT IN AN ORGANIZED HEALTH CARE EDUCATION/TRAINING PROGRAM

## 2022-10-26 PROCEDURE — 83036 HEMOGLOBIN GLYCOSYLATED A1C: CPT | Performed by: STUDENT IN AN ORGANIZED HEALTH CARE EDUCATION/TRAINING PROGRAM

## 2022-10-26 PROCEDURE — 90694 VACC AIIV4 NO PRSRV 0.5ML IM: CPT | Performed by: STUDENT IN AN ORGANIZED HEALTH CARE EDUCATION/TRAINING PROGRAM

## 2022-10-26 RX ORDER — AMLODIPINE BESYLATE 5 MG/1
5 TABLET ORAL
Qty: 90 TABLET | Refills: 2 | Status: CANCELLED | OUTPATIENT
Start: 2022-10-26

## 2022-10-26 NOTE — PATIENT INSTRUCTIONS
If can tolerate new medicine well, will likely DC Jardiance and or Metformin after reaching higher dosing. I think this is an opportunity to consider adding on a treatment for diabetes called GLP 1 agonist. Some medications in this group have been shown to have good effect on A1C but more importantly have also shown cardiovascular, weight loss, and renal protection benefits. Every insurance is different and they may request we use a different medicine from this class but I can send a script for you to the pharmacy if you'd like to start. All but one (Rybelsus) of this group of medications are injectables, however most are only once weekly. You can alternate the injection sites (see image below.) If you have any questions or concerns we can schedule a visit to review. Choosing your injection site  Be sure to use a different spot in that area every time        If you experience nausea, you may find the following tips helpful:    Eat smaller meals  Try splitting your 3 daily meals into 4 or more smaller ones  Stop eating when you feel full  Avoid fat or fatty foods  Try eating bland foods like toast, crackers, or rice    Diabetes drugs in the GLP-1 agonists class include:  Dulaglutide (Trulicity), taken by injection weekly  Exenatide extended release (Bydureon), taken by injection weekly  Exenatide (Byetta), taken by injection twice daily  Semaglutide (Ozempic), taken by injection weekly  Semaglutide (Rybelsus), taken by mouth once daily  Liraglutide (Victoza), taken by injection daily  Lixisenatide (Adlyxin), taken by injection daily    Vaccine Information Statement    Influenza (Flu) Vaccine (Inactivated or Recombinant): What You Need to Know    Many vaccine information statements are available in Togolese and other languages. See www.immunize.org/vis. Hojas de información sobre vacunas están disponibles en español y en muchos otros idiomas. Visite www.immunize.org/vis.     1. Why get vaccinated? Influenza vaccine can prevent influenza (flu). Flu is a contagious disease that spreads around the [place] every year, usually between October and May. Anyone can get the flu, but it is more dangerous for some people. Infants and young children, people 72 years and older, pregnant people, and people with certain health conditions or a weakened immune system are at greatest risk of flu complications. Pneumonia, bronchitis, sinus infections, and ear infections are examples of flu-related complications. If you have a medical condition, such as heart disease, cancer, or diabetes, flu can make it worse. Flu can cause fever and chills, sore throat, muscle aches, fatigue, cough, headache, and runny or stuffy nose. Some people may have vomiting and diarrhea, though this is more common in children than adults. In an average year, thousands of people in the [facility] die from flu, and many more are hospitalized. Flu vaccine prevents millions of illnesses and flu-related visits to the doctor each year. 2. Influenza vaccines     CDC recommends everyone 6 months and older get vaccinated every flu season. Children 6 months through 6years of age may need 2 doses during a single flu season. Everyone else needs only 1 dose each flu season. It takes about 2 weeks for protection to develop after vaccination. There are many flu viruses, and they are always changing. Each year a new flu vaccine is made to protect against the influenza viruses believed to be likely to cause disease in the upcoming flu season. Even when the vaccine doesnt exactly match these viruses, it may still provide some protection. Influenza vaccine does not cause flu. Influenza vaccine may be given at the same time as other vaccines.     3. Talk with your health care provider    Tell your vaccination provider if the person getting the vaccine:  Has had an allergic reaction after a previous dose of influenza vaccine, or has any severe, life-threatening allergies   Has ever had Guillain-Barré Syndrome (also called GBS)    In some cases, your health care provider may decide to postpone influenza vaccination until a future visit. Influenza vaccine can be administered at any time during pregnancy. People who are or will be pregnant during influenza season should receive inactivated influenza vaccine. People with minor illnesses, such as a cold, may be vaccinated. People who are moderately or severely ill should usually wait until they recover before getting influenza vaccine. Your health care provider can give you more information. 4. Risks of a vaccine reaction    Soreness, redness, and swelling where the shot is given, fever, muscle aches, and headache can happen after influenza vaccination. There may be a very small increased risk of Guillain-Barré Syndrome (GBS) after inactivated influenza vaccine (the flu shot). [facility] children who get the flu shot along with pneumococcal vaccine (PCV13) and/or DTaP vaccine at the same time might be slightly more likely to have a seizure caused by fever. Tell your health care provider if a child who is getting flu vaccine has ever had a seizure. People sometimes faint after medical procedures, including vaccination. Tell your provider if you feel dizzy or have vision changes or ringing in the ears. As with any medicine, there is a very remote chance of a vaccine causing a severe allergic reaction, other serious injury, or death. 5. What if there is a serious problem? An allergic reaction could occur after the vaccinated person leaves the clinic. If you see signs of a severe allergic reaction (hives, swelling of the face and throat, difficulty breathing, a fast heartbeat, dizziness, or weakness), call 9-1-1 and get the person to the nearest hospital.    For other signs that concern you, call your health care provider.     Adverse reactions should be reported to the Vaccine Adverse Event Reporting System (VAERS). Your health care provider will usually file this report, or you can do it yourself. Visit the VAERS website at www.vaers. [facility].gov or call 1-995.275.1832. VAERS is only for reporting reactions, and VAERS staff members do not give medical advice. 6. The National Vaccine Injury Compensation Program    The [facility] Vaccine Injury Compensation Program (VICP) is a federal program that was created to compensate people who may have been injured by certain vaccines. Claims regarding alleged injury or death due to vaccination have a time limit for filing, which may be as short as two years. Visit the VICP website at www.[facility].gov/vaccinecompensation or call 0-147.561.5026 to learn about the program and about filing a claim. 7. How can I learn more? Ask your health care provider. Call your local or state health department. Visit the website of the Food and Drug Administration (FDA) for vaccine package inserts and additional information at www.fda.gov/vaccines-blood-biologics/vaccines. Contact the Centers for Disease Control and Prevention (CDC): Call 1-699.645.8302 (9-372-CWD-INFO) or  Visit CDCs influenza website at www.cdc.gov/flu. Vaccine Information Statement   Inactivated Influenza Vaccine   8/6/2021  42 FUAD Ortiz 988HA-13   Department of Health and Human Services  Centers for Disease Control and Prevention    Office Use Only

## 2022-10-26 NOTE — PROGRESS NOTES
Radhames Atkinson is a 73 y.o. female (: 1950) presenting to address:    Chief Complaint   Patient presents with    Diabetes     A1c check       Flu Immunization/s administered 10/26/2022 by Moreno Gonzalez with consent. Patient tolerated procedure well. No reactions noted. Vitals:    10/26/22 0704   BP: 128/79   Pulse: 84   Resp: 16   Temp: 97.9 °F (36.6 °C)   TempSrc: Temporal   SpO2: 100%   Weight: 179 lb (81.2 kg)   Height: 5' 2\" (1.575 m)   PainSc:   0 - No pain       Hearing/Vision:   No results found. Learning Assessment:     Learning Assessment 2019   PRIMARY LEARNER Patient   HIGHEST LEVEL OF EDUCATION - PRIMARY LEARNER  GRADUATED HIGH SCHOOL OR GED   BARRIERS PRIMARY LEARNER NONE   CO-LEARNER CAREGIVER No   PRIMARY LANGUAGE ENGLISH   LEARNER PREFERENCE PRIMARY LISTENING   ANSWERED BY patient   RELATIONSHIP SELF     Depression Screening:     3 most recent PHQ Screens 10/26/2022   Little interest or pleasure in doing things Not at all   Feeling down, depressed, irritable, or hopeless Not at all   Total Score PHQ 2 0     Fall Risk Assessment:     Fall Risk Assessment, last 12 mths 2022   Able to walk? Yes   Fall in past 12 months? 0   Do you feel unsteady? 0   Are you worried about falling 0     Abuse Screening:     Abuse Screening Questionnaire 2022   Do you ever feel afraid of your partner? N   Are you in a relationship with someone who physically or mentally threatens you? N   Is it safe for you to go home? Y     ADL Assessment:   No flowsheet data found. Coordination of Care Questionaire:   1. \"Have you been to the ER, urgent care clinic since your last visit? Hospitalized since your last visit? \" No    2. \"Have you seen or consulted any other health care providers outside of the [address] [place] [address] since your last visit? \" No     3. For patients aged 39-70: Has the patient had a colonoscopy / FIT/ Cologuard?  Yes - no Care Gap present      If the patient is female:    4. For patients aged 41-77: Has the patient had a mammogram within the past 2 years? Yes - no Care Gap present  See top three    5. For patients aged 21-65: Has the patient had a pap smear? NA - based on age or sex    Advanced Directive:   1. Do you have an Advanced Directive? NO    2. Would you like information on Advanced Directives?  YES

## 2022-11-03 ENCOUNTER — TELEPHONE (OUTPATIENT)
Dept: FAMILY MEDICINE CLINIC | Age: 72
End: 2022-11-03

## 2022-11-03 DIAGNOSIS — R30.0 BURNING WITH URINATION: ICD-10-CM

## 2022-11-03 DIAGNOSIS — N89.8 VAGINAL ITCHING: Primary | ICD-10-CM

## 2022-11-03 RX ORDER — FLUCONAZOLE 150 MG/1
150 TABLET ORAL DAILY
Qty: 1 TABLET | Refills: 0 | Status: SHIPPED | OUTPATIENT
Start: 2022-11-03 | End: 2022-11-04

## 2022-11-03 NOTE — TELEPHONE ENCOUNTER
Pt stated that she has a UTI from her Jardiance and she would like to know if Dr Lokesh Morales would prescribe her Diflucan. Pt stated that she has had the same thing before.  I'm not sure if she meant a yeast because the Diflucan treats yeast. Please advise

## 2022-11-03 NOTE — TELEPHONE ENCOUNTER
Spoke to the pt and states that she is having itching and burning while urinating. Pt denies any abdominal pain, discharge, odor, and blood in urine. Pt would like to know if she can get a rx for diflucan. Informed the pt that the request will be sent to Dr. You Tang. Pt verbalized understanding.

## 2022-11-03 NOTE — TELEPHONE ENCOUNTER
Unfortunately I cannot rule out a urine infection with those symptoms which could be dangerous if untreated at her age. I can send Diflucan but can you see if she can come in tomorrow for lab apt to have nuswab, urinalysis, and urine culture sent to confirm we are treating the right infection?

## 2022-11-04 ENCOUNTER — HOSPITAL ENCOUNTER (OUTPATIENT)
Dept: LAB | Age: 72
Discharge: HOME | End: 2022-11-04
Payer: MEDICARE

## 2022-11-04 ENCOUNTER — APPOINTMENT (OUTPATIENT)
Dept: FAMILY MEDICINE CLINIC | Age: 72
End: 2022-11-04
Payer: MEDICARE

## 2022-11-04 DIAGNOSIS — R30.0 BURNING WITH URINATION: ICD-10-CM

## 2022-11-04 DIAGNOSIS — I10 PRIMARY HYPERTENSION: ICD-10-CM

## 2022-11-04 DIAGNOSIS — E78.5 HYPERLIPIDEMIA, UNSPECIFIED HYPERLIPIDEMIA TYPE: ICD-10-CM

## 2022-11-04 DIAGNOSIS — N89.8 VAGINAL ITCHING: ICD-10-CM

## 2022-11-04 DIAGNOSIS — I10 ESSENTIAL HYPERTENSION: ICD-10-CM

## 2022-11-04 DIAGNOSIS — E66.01 SEVERE OBESITY (BMI 35.0-35.9 WITH COMORBIDITY) (HCC): ICD-10-CM

## 2022-11-04 DIAGNOSIS — N18.31 TYPE 2 DIABETES MELLITUS WITH STAGE 3A CHRONIC KIDNEY DISEASE, WITHOUT LONG-TERM CURRENT USE OF INSULIN (HCC): ICD-10-CM

## 2022-11-04 DIAGNOSIS — N18.31 STAGE 3A CHRONIC KIDNEY DISEASE (HCC): ICD-10-CM

## 2022-11-04 DIAGNOSIS — E11.22 TYPE 2 DIABETES MELLITUS WITH STAGE 3A CHRONIC KIDNEY DISEASE, WITHOUT LONG-TERM CURRENT USE OF INSULIN (HCC): ICD-10-CM

## 2022-11-04 LAB
ALBUMIN SERPL-MCNC: 3.5 G/DL (ref 3.4–5)
ALBUMIN/GLOB SERPL: 0.9 {RATIO} (ref 0.8–1.7)
ALP SERPL-CCNC: 57 U/L (ref 45–117)
ALT SERPL-CCNC: 22 U/L (ref 13–56)
ANION GAP SERPL CALC-SCNC: 6 MMOL/L (ref 3–18)
AST SERPL-CCNC: 12 U/L (ref 10–38)
BASOPHILS # BLD: 0.1 K/UL (ref 0–0.1)
BASOPHILS NFR BLD: 1 % (ref 0–2)
BILIRUB SERPL-MCNC: 0.4 MG/DL (ref 0.2–1)
BILIRUB UR QL STRIP: NEGATIVE
BUN SERPL-MCNC: 21 MG/DL (ref 7–18)
BUN/CREAT SERPL: 17 (ref 12–20)
CALCIUM SERPL-MCNC: 9.1 MG/DL (ref 8.5–10.1)
CHLORIDE SERPL-SCNC: 103 MMOL/L (ref 100–111)
CHOLEST SERPL-MCNC: 145 MG/DL
CO2 SERPL-SCNC: 27 MMOL/L (ref 21–32)
CREAT SERPL-MCNC: 1.21 MG/DL (ref 0.6–1.3)
DIFFERENTIAL METHOD BLD: ABNORMAL
EOSINOPHIL # BLD: 0.2 K/UL (ref 0–0.4)
EOSINOPHIL NFR BLD: 3 % (ref 0–5)
ERYTHROCYTE [DISTWIDTH] IN BLOOD BY AUTOMATED COUNT: 15.7 % (ref 11.6–14.5)
EST. AVERAGE GLUCOSE BLD GHB EST-MCNC: 166 MG/DL
GLOBULIN SER CALC-MCNC: 3.9 G/DL (ref 2–4)
GLUCOSE SERPL-MCNC: 157 MG/DL (ref 74–99)
GLUCOSE UR-MCNC: NORMAL MG/DL
HBA1C MFR BLD: 7.4 % (ref 4.2–5.6)
HCT VFR BLD AUTO: 38.1 % (ref 35–45)
HDLC SERPL-MCNC: 76 MG/DL (ref 40–60)
HDLC SERPL: 1.9 {RATIO} (ref 0–5)
HGB BLD-MCNC: 11.7 G/DL (ref 12–16)
IMM GRANULOCYTES # BLD AUTO: 0 K/UL (ref 0–0.04)
IMM GRANULOCYTES NFR BLD AUTO: 0 % (ref 0–0.5)
KETONES P FAST UR STRIP-MCNC: NEGATIVE MG/DL
LDLC SERPL CALC-MCNC: 51.8 MG/DL (ref 0–100)
LIPID PROFILE,FLP: ABNORMAL
LYMPHOCYTES # BLD: 1.8 K/UL (ref 0.9–3.6)
LYMPHOCYTES NFR BLD: 26 % (ref 21–52)
MCH RBC QN AUTO: 25.5 PG (ref 24–34)
MCHC RBC AUTO-ENTMCNC: 30.7 G/DL (ref 31–37)
MCV RBC AUTO: 83 FL (ref 78–100)
MONOCYTES # BLD: 0.5 K/UL (ref 0.05–1.2)
MONOCYTES NFR BLD: 8 % (ref 3–10)
NEUTS SEG # BLD: 4.2 K/UL (ref 1.8–8)
NEUTS SEG NFR BLD: 62 % (ref 40–73)
NRBC # BLD: 0 K/UL (ref 0–0.01)
NRBC BLD-RTO: 0 PER 100 WBC
PH UR STRIP: 5.5 [PH] (ref 4.6–8)
PLATELET # BLD AUTO: 388 K/UL (ref 135–420)
PMV BLD AUTO: 9.9 FL (ref 9.2–11.8)
POTASSIUM SERPL-SCNC: 5.2 MMOL/L (ref 3.5–5.5)
PROT SERPL-MCNC: 7.4 G/DL (ref 6.4–8.2)
PROT UR QL STRIP: NEGATIVE
RBC # BLD AUTO: 4.59 M/UL (ref 4.2–5.3)
SODIUM SERPL-SCNC: 136 MMOL/L (ref 136–145)
SP GR UR STRIP: 1.01 (ref 1–1.03)
T4 FREE SERPL-MCNC: 1.2 NG/DL (ref 0.7–1.5)
TRIGL SERPL-MCNC: 86 MG/DL (ref ?–150)
TSH SERPL DL<=0.05 MIU/L-ACNC: 1.13 UIU/ML (ref 0.36–3.74)
UA UROBILINOGEN AMB POC: NORMAL (ref 0.2–1)
URINALYSIS CLARITY POC: CLEAR
URINALYSIS COLOR POC: NORMAL
URINE BLOOD POC: NEGATIVE
URINE LEUKOCYTES POC: NEGATIVE
URINE NITRITES POC: NEGATIVE
VLDLC SERPL CALC-MCNC: 17.2 MG/DL
WBC # BLD AUTO: 6.8 K/UL (ref 4.6–13.2)

## 2022-11-04 PROCEDURE — 85025 COMPLETE CBC W/AUTO DIFF WBC: CPT

## 2022-11-04 PROCEDURE — 87801 DETECT AGNT MULT DNA AMPLI: CPT

## 2022-11-04 PROCEDURE — 84439 ASSAY OF FREE THYROXINE: CPT

## 2022-11-04 PROCEDURE — 80061 LIPID PANEL: CPT

## 2022-11-04 PROCEDURE — 83036 HEMOGLOBIN GLYCOSYLATED A1C: CPT

## 2022-11-04 PROCEDURE — 80053 COMPREHEN METABOLIC PANEL: CPT

## 2022-11-04 PROCEDURE — 87086 URINE CULTURE/COLONY COUNT: CPT

## 2022-11-04 PROCEDURE — 82306 VITAMIN D 25 HYDROXY: CPT

## 2022-11-04 PROCEDURE — 36415 COLL VENOUS BLD VENIPUNCTURE: CPT

## 2022-11-04 PROCEDURE — 81003 URINALYSIS AUTO W/O SCOPE: CPT | Performed by: STUDENT IN AN ORGANIZED HEALTH CARE EDUCATION/TRAINING PROGRAM

## 2022-11-04 PROCEDURE — 84443 ASSAY THYROID STIM HORMONE: CPT

## 2022-11-04 NOTE — TELEPHONE ENCOUNTER
Pt has been scheduled to do Nuswab, POC UDS and UC.     Future Appointments   Date Time Provider Nohelia Cui   11/4/2022 10:00 AM LAB_BSMA BSMA BS AMB

## 2022-11-06 LAB
BACTERIA SPEC CULT: NORMAL
SERVICE CMNT-IMP: NORMAL

## 2022-11-07 LAB — 25(OH)D3 SERPL-MCNC: 8.8 NG/ML (ref 30–100)

## 2022-11-08 LAB
C GLABRATA DNA VAG QL NAA+PROBE: POSITIVE
CANDIDA DNA VAG QL NAA+PROBE: POSITIVE
SPECIMEN SOURCE: ABNORMAL
T VAGINALIS RRNA SPEC QL NAA+PROBE: NEGATIVE

## 2022-11-09 DIAGNOSIS — E55.9 VITAMIN D DEFICIENCY: Primary | ICD-10-CM

## 2022-11-09 RX ORDER — ERGOCALCIFEROL 1.25 MG/1
50000 CAPSULE ORAL
Qty: 12 CAPSULE | Refills: 3 | Status: SHIPPED | OUTPATIENT
Start: 2022-11-09 | End: 2023-01-26

## 2022-11-09 NOTE — PROGRESS NOTES
No recent log in to [employer], can you call with results? Your  Liver and Thyroid function are normal. Your bad cholesterol (LDL) is well controlled. Your A1C (sugar average for 3 months) has increased slightly. Your kidney function remains stable. Your Vitamin D level was found to be low and I would recommend supplementation with Vitamin D 10238 units weekly. I sent Rx to pharmacy.

## 2022-12-16 DIAGNOSIS — E11.22 TYPE 2 DIABETES MELLITUS WITH STAGE 3A CHRONIC KIDNEY DISEASE, WITHOUT LONG-TERM CURRENT USE OF INSULIN (HCC): ICD-10-CM

## 2022-12-16 DIAGNOSIS — E78.5 HYPERLIPIDEMIA, UNSPECIFIED HYPERLIPIDEMIA TYPE: ICD-10-CM

## 2022-12-16 DIAGNOSIS — N18.31 TYPE 2 DIABETES MELLITUS WITH STAGE 3A CHRONIC KIDNEY DISEASE, WITHOUT LONG-TERM CURRENT USE OF INSULIN (HCC): ICD-10-CM

## 2022-12-16 DIAGNOSIS — N18.31 STAGE 3A CHRONIC KIDNEY DISEASE (HCC): ICD-10-CM

## 2022-12-16 DIAGNOSIS — E66.01 SEVERE OBESITY (BMI 35.0-35.9 WITH COMORBIDITY) (HCC): ICD-10-CM

## 2022-12-16 DIAGNOSIS — I10 ESSENTIAL HYPERTENSION: ICD-10-CM

## 2023-02-16 ENCOUNTER — HOSPITAL ENCOUNTER (OUTPATIENT)
Dept: WOMENS IMAGING | Facility: HOSPITAL | Age: 73
Discharge: HOME | End: 2023-02-16
Payer: MEDICARE

## 2023-02-16 DIAGNOSIS — Z12.31 ENCOUNTER FOR SCREENING MAMMOGRAM FOR BREAST CANCER: ICD-10-CM

## 2023-02-16 PROCEDURE — 77063 BREAST TOMOSYNTHESIS BI: CPT

## 2023-02-17 NOTE — RESULT ENCOUNTER NOTE
Not active [employer] user, can you call with result and encourage to set up [employer]? To set up [address], you can call: [phone] or visit: [email or web address]    I reviewed your recent mammogram results and I'm happy to report they are normal and you can continue with routine annual screening.

## 2023-02-23 DIAGNOSIS — Z12.31 VISIT FOR SCREENING MAMMOGRAM: Primary | ICD-10-CM

## 2023-03-09 LAB — CREATININE, EXTERNAL: 1.16

## 2023-04-25 SDOH — ECONOMIC STABILITY: INCOME INSECURITY: HOW HARD IS IT FOR YOU TO PAY FOR THE VERY BASICS LIKE FOOD, HOUSING, MEDICAL CARE, AND HEATING?: NOT HARD AT ALL

## 2023-04-25 SDOH — HEALTH STABILITY: PHYSICAL HEALTH: ON AVERAGE, HOW MANY DAYS PER WEEK DO YOU ENGAGE IN MODERATE TO STRENUOUS EXERCISE (LIKE A BRISK WALK)?: 2 DAYS

## 2023-04-25 SDOH — ECONOMIC STABILITY: TRANSPORTATION INSECURITY
IN THE PAST 12 MONTHS, HAS LACK OF TRANSPORTATION KEPT YOU FROM MEETINGS, WORK, OR FROM GETTING THINGS NEEDED FOR DAILY LIVING?: NO

## 2023-04-25 SDOH — ECONOMIC STABILITY: HOUSING INSECURITY
IN THE LAST 12 MONTHS, WAS THERE A TIME WHEN YOU DID NOT HAVE A STEADY PLACE TO SLEEP OR SLEPT IN A SHELTER (INCLUDING NOW)?: NO

## 2023-04-25 SDOH — HEALTH STABILITY: PHYSICAL HEALTH: ON AVERAGE, HOW MANY MINUTES DO YOU ENGAGE IN EXERCISE AT THIS LEVEL?: 20 MIN

## 2023-04-25 SDOH — ECONOMIC STABILITY: FOOD INSECURITY: WITHIN THE PAST 12 MONTHS, THE FOOD YOU BOUGHT JUST DIDN'T LAST AND YOU DIDN'T HAVE MONEY TO GET MORE.: NEVER TRUE

## 2023-04-25 SDOH — ECONOMIC STABILITY: FOOD INSECURITY: WITHIN THE PAST 12 MONTHS, YOU WORRIED THAT YOUR FOOD WOULD RUN OUT BEFORE YOU GOT MONEY TO BUY MORE.: NEVER TRUE

## 2023-04-25 ASSESSMENT — PATIENT HEALTH QUESTIONNAIRE - PHQ9
SUM OF ALL RESPONSES TO PHQ QUESTIONS 1-9: 2
2. FEELING DOWN, DEPRESSED OR HOPELESS: 1
SUM OF ALL RESPONSES TO PHQ QUESTIONS 1-9: 2
SUM OF ALL RESPONSES TO PHQ9 QUESTIONS 1 & 2: 2
1. LITTLE INTEREST OR PLEASURE IN DOING THINGS: 1
SUM OF ALL RESPONSES TO PHQ QUESTIONS 1-9: 2
SUM OF ALL RESPONSES TO PHQ QUESTIONS 1-9: 2

## 2023-04-25 ASSESSMENT — LIFESTYLE VARIABLES
HOW OFTEN DO YOU HAVE A DRINK CONTAINING ALCOHOL: 1
HOW OFTEN DO YOU HAVE SIX OR MORE DRINKS ON ONE OCCASION: 1
HOW MANY STANDARD DRINKS CONTAINING ALCOHOL DO YOU HAVE ON A TYPICAL DAY: PATIENT DOES NOT DRINK
HOW MANY STANDARD DRINKS CONTAINING ALCOHOL DO YOU HAVE ON A TYPICAL DAY: 0
HOW OFTEN DO YOU HAVE A DRINK CONTAINING ALCOHOL: NEVER

## 2023-04-26 ENCOUNTER — TELEPHONE (OUTPATIENT)
Dept: FAMILY MEDICINE CLINIC | Facility: CLINIC | Age: 73
End: 2023-04-26

## 2023-04-26 ENCOUNTER — HOSPITAL ENCOUNTER (OUTPATIENT)
Facility: HOSPITAL | Age: 73
Setting detail: SPECIMEN
Discharge: HOME | End: 2023-04-29
Payer: MEDICARE

## 2023-04-26 ENCOUNTER — OFFICE VISIT (OUTPATIENT)
Dept: FAMILY MEDICINE CLINIC | Facility: CLINIC | Age: 73
End: 2023-04-26

## 2023-04-26 VITALS
HEART RATE: 82 BPM | WEIGHT: 160.8 LBS | RESPIRATION RATE: 15 BRPM | HEIGHT: 62 IN | TEMPERATURE: 97.7 F | OXYGEN SATURATION: 100 % | SYSTOLIC BLOOD PRESSURE: 125 MMHG | DIASTOLIC BLOOD PRESSURE: 75 MMHG | BODY MASS INDEX: 29.59 KG/M2

## 2023-04-26 DIAGNOSIS — N18.31 CHRONIC KIDNEY DISEASE, STAGE 3A (HCC): ICD-10-CM

## 2023-04-26 DIAGNOSIS — M35.9 SYSTEMIC INVOLVEMENT OF CONNECTIVE TISSUE, UNSPECIFIED (HCC): ICD-10-CM

## 2023-04-26 DIAGNOSIS — E11.22 TYPE 2 DIABETES MELLITUS WITH DIABETIC CHRONIC KIDNEY DISEASE, UNSPECIFIED CKD STAGE, UNSPECIFIED WHETHER LONG TERM INSULIN USE (HCC): Primary | ICD-10-CM

## 2023-04-26 DIAGNOSIS — Z00.00 MEDICARE ANNUAL WELLNESS VISIT, SUBSEQUENT: ICD-10-CM

## 2023-04-26 DIAGNOSIS — E11.22 TYPE 2 DIABETES MELLITUS WITH DIABETIC CHRONIC KIDNEY DISEASE, UNSPECIFIED CKD STAGE, UNSPECIFIED WHETHER LONG TERM INSULIN USE (HCC): ICD-10-CM

## 2023-04-26 DIAGNOSIS — H66.3X1 CHRONIC SUPPURATIVE OTITIS MEDIA OF RIGHT EAR, UNSPECIFIED OTITIS MEDIA LOCATION: ICD-10-CM

## 2023-04-26 LAB
CREAT UR-MCNC: 78 MG/DL (ref 30–125)
HBA1C MFR BLD: 5.9 %
MICROALBUMIN UR-MCNC: <0.5 MG/DL (ref 0–3)
MICROALBUMIN/CREAT UR-RTO: NORMAL MG/G (ref 0–30)

## 2023-04-26 PROCEDURE — 82570 ASSAY OF URINE CREATININE: CPT

## 2023-04-26 PROCEDURE — 82043 UR ALBUMIN QUANTITATIVE: CPT

## 2023-04-26 RX ORDER — AMOXICILLIN AND CLAVULANATE POTASSIUM 875; 125 MG/1; MG/1
1 TABLET, FILM COATED ORAL 2 TIMES DAILY
Qty: 14 TABLET | Refills: 0 | Status: SHIPPED | OUTPATIENT
Start: 2023-04-26 | End: 2023-05-03

## 2023-04-26 SDOH — ECONOMIC STABILITY: FOOD INSECURITY: WITHIN THE PAST 12 MONTHS, YOU WORRIED THAT YOUR FOOD WOULD RUN OUT BEFORE YOU GOT MONEY TO BUY MORE.: NEVER TRUE

## 2023-04-26 SDOH — ECONOMIC STABILITY: FOOD INSECURITY: WITHIN THE PAST 12 MONTHS, THE FOOD YOU BOUGHT JUST DIDN'T LAST AND YOU DIDN'T HAVE MONEY TO GET MORE.: NEVER TRUE

## 2023-04-26 SDOH — ECONOMIC STABILITY: INCOME INSECURITY: HOW HARD IS IT FOR YOU TO PAY FOR THE VERY BASICS LIKE FOOD, HOUSING, MEDICAL CARE, AND HEATING?: NOT HARD AT ALL

## 2023-04-28 ENCOUNTER — TELEPHONE (OUTPATIENT)
Dept: FAMILY MEDICINE CLINIC | Facility: CLINIC | Age: 73
End: 2023-04-28

## 2023-04-28 DIAGNOSIS — E11.22 TYPE 2 DIABETES MELLITUS WITH CHRONIC KIDNEY DISEASE, WITHOUT LONG-TERM CURRENT USE OF INSULIN, UNSPECIFIED CKD STAGE (HCC): Primary | ICD-10-CM

## 2023-05-05 ENCOUNTER — TELEPHONE (OUTPATIENT)
Dept: FAMILY MEDICINE CLINIC | Facility: CLINIC | Age: 73
End: 2023-05-05

## 2023-05-09 NOTE — TELEPHONE ENCOUNTER
Pt called to check on status of getting test strips. Pt has not been able to test blood sugar. Please advise.   Future Appointments   Date Time Provider Nicholas Terrell   10/26/2023  7:00 AM DO MIREYA StearnsMA BS AMB   2/19/2024 11:30 AM [address] [facility] [address] [facility] RM [facility]

## 2023-05-11 RX ORDER — DOXYCYCLINE HYCLATE 100 MG
100 TABLET ORAL 2 TIMES DAILY
Qty: 10 TABLET | Refills: 0 | Status: SHIPPED | OUTPATIENT
Start: 2023-05-11 | End: 2023-05-16

## 2023-05-20 DIAGNOSIS — E78.5 HYPERLIPIDEMIA, UNSPECIFIED: ICD-10-CM

## 2023-05-22 RX ORDER — ROSUVASTATIN CALCIUM 5 MG/1
5 TABLET, COATED ORAL DAILY
Qty: 90 TABLET | Refills: 3 | Status: SHIPPED | OUTPATIENT
Start: 2023-05-22

## 2023-06-02 DIAGNOSIS — E78.5 HYPERLIPIDEMIA, UNSPECIFIED HYPERLIPIDEMIA TYPE: ICD-10-CM

## 2023-06-02 DIAGNOSIS — N18.31 STAGE 3A CHRONIC KIDNEY DISEASE (HCC): Primary | ICD-10-CM

## 2023-06-02 DIAGNOSIS — E11.22 TYPE 2 DIABETES MELLITUS WITH DIABETIC CHRONIC KIDNEY DISEASE (HCC): ICD-10-CM

## 2023-06-02 DIAGNOSIS — I10 ESSENTIAL (PRIMARY) HYPERTENSION: ICD-10-CM

## 2023-06-02 RX ORDER — ORAL SEMAGLUTIDE 7 MG/1
TABLET ORAL
Qty: 90 TABLET | Refills: 5 | Status: CANCELLED | OUTPATIENT
Start: 2023-06-02

## 2023-07-01 DIAGNOSIS — E11.22 TYPE 2 DIABETES MELLITUS WITH DIABETIC CHRONIC KIDNEY DISEASE, UNSPECIFIED CKD STAGE, UNSPECIFIED WHETHER LONG TERM INSULIN USE (HCC): Primary | ICD-10-CM

## 2023-07-03 NOTE — TELEPHONE ENCOUNTER
Last seen: 04/26/23    Last filled: 09/20/22    Jardiance 25 mg tablet 90 Tablet 2       Future Appointments   Date Time Provider [address]   10/26/2023  7:00 AM DO MIREYA StearnsMA BS AMB   2/19/2024 11:30  [address] [facility] TG BX RM 1 [address]

## 2023-08-07 DIAGNOSIS — E55.9 VITAMIN D DEFICIENCY, UNSPECIFIED: ICD-10-CM

## 2023-08-07 DIAGNOSIS — I10 ESSENTIAL (PRIMARY) HYPERTENSION: ICD-10-CM

## 2023-08-07 RX ORDER — CARVEDILOL 6.25 MG/1
TABLET ORAL
Qty: 180 TABLET | Refills: 3 | Status: SHIPPED | OUTPATIENT
Start: 2023-08-07

## 2023-08-07 RX ORDER — LANCETS 33 GAUGE
1 EACH MISCELLANEOUS 3 TIMES DAILY PRN
Qty: 100 EACH | Refills: 11 | Status: SHIPPED | OUTPATIENT
Start: 2023-08-07

## 2023-08-07 RX ORDER — ERGOCALCIFEROL 1.25 MG/1
CAPSULE ORAL
Qty: 12 CAPSULE | Refills: 3 | Status: SHIPPED | OUTPATIENT
Start: 2023-08-07

## 2023-08-07 RX ORDER — RAMIPRIL 10 MG/1
CAPSULE ORAL
Qty: 90 CAPSULE | Refills: 3 | Status: SHIPPED | OUTPATIENT
Start: 2023-08-07

## 2023-08-07 NOTE — TELEPHONE ENCOUNTER
Last visit: 23  Next visit:   Future Appointments   Date Time Provider [address]   10/26/2023  7:00 AM Emely Segundo DO BSMA BS AMB   2024 11:30  [address] [facility] BX RM 1 [address]     Last filled:    Lancets: not listed  Core22  Ramipril; 10/18/22  Vitamin D: 22

## 2023-08-24 ENCOUNTER — TELEPHONE (OUTPATIENT)
Dept: FAMILY MEDICINE CLINIC | Facility: CLINIC | Age: 73
End: 2023-08-24

## 2023-08-24 NOTE — TELEPHONE ENCOUNTER
----- Message from [address] [facility] [address] sent at 8/24/2023 11:27 AM EDT -----  Subject: Message to Provider    QUESTIONS  Information for Provider? Cristobal Mccurdy was wondering what the status is of the   new drs coming to the practice. She had an appt with Dr. Farheen Schroeder 10/26/2023   and wondered if she will be seeing a new dr. Please call and advise.   ---------------------------------------------------------------------------  --------------  Tobias [place] Anna Marie  [phone]; OK to leave message on voicemail  ---------------------------------------------------------------------------  --------------  SCRIPT ANSWERS  Relationship to Patient?  Self

## 2023-10-19 DIAGNOSIS — E11.22 TYPE 2 DIABETES MELLITUS WITH DIABETIC CHRONIC KIDNEY DISEASE, UNSPECIFIED CKD STAGE, UNSPECIFIED WHETHER LONG TERM INSULIN USE (HCC): ICD-10-CM

## 2023-10-19 NOTE — TELEPHONE ENCOUNTER
Received fax from [address] [place] [address] for refill of:    Metformin 1000 mg tab; qty 90 w/1 refill

## 2023-10-20 ENCOUNTER — TELEPHONE (OUTPATIENT)
Dept: FAMILY MEDICINE CLINIC | Facility: CLINIC | Age: 73
End: 2023-10-20

## 2023-10-20 NOTE — TELEPHONE ENCOUNTER
lvm informing pt that appt will be cancelled due to provider no longer working for the practice hoa will be cancelled.  @ 9:15 on 10/20/23

## 2023-11-08 ENCOUNTER — TELEPHONE (OUTPATIENT)
Dept: FAMILY MEDICINE CLINIC | Facility: CLINIC | Age: 73
End: 2023-11-08

## 2023-11-08 NOTE — TELEPHONE ENCOUNTER
Pt called requesting a refill of metformin and vitamin D after stating [facility] did not have her meds on file. Called over to [facility] and confirmed with patient that she has refills for both RXs.

## 2024-02-05 SDOH — HEALTH STABILITY: PHYSICAL HEALTH: ON AVERAGE, HOW MANY DAYS PER WEEK DO YOU ENGAGE IN MODERATE TO STRENUOUS EXERCISE (LIKE A BRISK WALK)?: 2 DAYS

## 2024-02-05 SDOH — HEALTH STABILITY: PHYSICAL HEALTH: ON AVERAGE, HOW MANY MINUTES DO YOU ENGAGE IN EXERCISE AT THIS LEVEL?: 30 MIN

## 2024-02-05 ASSESSMENT — PATIENT HEALTH QUESTIONNAIRE - PHQ9
SUM OF ALL RESPONSES TO PHQ QUESTIONS 1-9: 2
SUM OF ALL RESPONSES TO PHQ QUESTIONS 1-9: 2
SUM OF ALL RESPONSES TO PHQ9 QUESTIONS 1 & 2: 2
1. LITTLE INTEREST OR PLEASURE IN DOING THINGS: 1
SUM OF ALL RESPONSES TO PHQ QUESTIONS 1-9: 2
SUM OF ALL RESPONSES TO PHQ QUESTIONS 1-9: 2
2. FEELING DOWN, DEPRESSED OR HOPELESS: 1

## 2024-02-05 ASSESSMENT — LIFESTYLE VARIABLES
HOW MANY STANDARD DRINKS CONTAINING ALCOHOL DO YOU HAVE ON A TYPICAL DAY: PATIENT DOES NOT DRINK
HOW MANY STANDARD DRINKS CONTAINING ALCOHOL DO YOU HAVE ON A TYPICAL DAY: 0
HOW OFTEN DO YOU HAVE A DRINK CONTAINING ALCOHOL: NEVER
HOW OFTEN DO YOU HAVE SIX OR MORE DRINKS ON ONE OCCASION: 1
HOW OFTEN DO YOU HAVE A DRINK CONTAINING ALCOHOL: 1

## 2024-02-07 ENCOUNTER — HOSPITAL ENCOUNTER (OUTPATIENT)
Facility: HOSPITAL | Age: 74
Setting detail: SPECIMEN
Discharge: HOME | End: 2024-02-10
Payer: MEDICARE

## 2024-02-07 ENCOUNTER — OFFICE VISIT (OUTPATIENT)
Dept: FAMILY MEDICINE CLINIC | Facility: CLINIC | Age: 74
End: 2024-02-07
Payer: MEDICARE

## 2024-02-07 VITALS
HEART RATE: 86 BPM | RESPIRATION RATE: 14 BRPM | TEMPERATURE: 97.7 F | OXYGEN SATURATION: 100 % | HEIGHT: 62 IN | DIASTOLIC BLOOD PRESSURE: 77 MMHG | SYSTOLIC BLOOD PRESSURE: 121 MMHG | WEIGHT: 152 LBS | BODY MASS INDEX: 27.97 KG/M2

## 2024-02-07 DIAGNOSIS — E11.22 TYPE 2 DIABETES MELLITUS WITH STAGE 2 CHRONIC KIDNEY DISEASE, WITHOUT LONG-TERM CURRENT USE OF INSULIN (HCC): ICD-10-CM

## 2024-02-07 DIAGNOSIS — N18.2 TYPE 2 DIABETES MELLITUS WITH STAGE 2 CHRONIC KIDNEY DISEASE, WITHOUT LONG-TERM CURRENT USE OF INSULIN (HCC): ICD-10-CM

## 2024-02-07 DIAGNOSIS — Z00.00 MEDICARE ANNUAL WELLNESS VISIT, SUBSEQUENT: ICD-10-CM

## 2024-02-07 DIAGNOSIS — I10 PRIMARY HYPERTENSION: ICD-10-CM

## 2024-02-07 DIAGNOSIS — Z00.00 MEDICARE ANNUAL WELLNESS VISIT, SUBSEQUENT: Primary | ICD-10-CM

## 2024-02-07 LAB
ANION GAP SERPL CALC-SCNC: 2 MMOL/L (ref 3–18)
BUN SERPL-MCNC: 23 MG/DL (ref 7–18)
BUN/CREAT SERPL: 18 (ref 12–20)
CALCIUM SERPL-MCNC: 9.9 MG/DL (ref 8.5–10.1)
CHLORIDE SERPL-SCNC: 109 MMOL/L (ref 100–111)
CHOLEST SERPL-MCNC: 139 MG/DL
CO2 SERPL-SCNC: 29 MMOL/L (ref 21–32)
CREAT SERPL-MCNC: 1.25 MG/DL (ref 0.6–1.3)
CREAT UR-MCNC: 75 MG/DL (ref 30–125)
ERYTHROCYTE [DISTWIDTH] IN BLOOD BY AUTOMATED COUNT: 14.4 % (ref 11.6–14.5)
EST. AVERAGE GLUCOSE BLD GHB EST-MCNC: 120 MG/DL
GLUCOSE SERPL-MCNC: 101 MG/DL (ref 74–99)
HBA1C MFR BLD: 5.8 % (ref 4.2–5.6)
HCT VFR BLD AUTO: 39.2 % (ref 35–45)
HDLC SERPL-MCNC: 69 MG/DL (ref 40–60)
HDLC SERPL: 2 (ref 0–5)
HGB BLD-MCNC: 12.3 G/DL (ref 12–16)
LDLC SERPL CALC-MCNC: 56 MG/DL (ref 0–100)
LIPID PANEL: ABNORMAL
MCH RBC QN AUTO: 25.5 PG (ref 24–34)
MCHC RBC AUTO-ENTMCNC: 31.4 G/DL (ref 31–37)
MCV RBC AUTO: 81.3 FL (ref 78–100)
MICROALBUMIN UR-MCNC: <0.5 MG/DL (ref 0–3)
MICROALBUMIN/CREAT UR-RTO: NORMAL MG/G (ref 0–30)
NRBC # BLD: 0 K/UL (ref 0–0.01)
NRBC BLD-RTO: 0 PER 100 WBC
PLATELET # BLD AUTO: 292 K/UL (ref 135–420)
PMV BLD AUTO: 9.7 FL (ref 9.2–11.8)
POTASSIUM SERPL-SCNC: 4.5 MMOL/L (ref 3.5–5.5)
RBC # BLD AUTO: 4.82 M/UL (ref 4.2–5.3)
SODIUM SERPL-SCNC: 140 MMOL/L (ref 136–145)
T4 FREE SERPL-MCNC: 1.2 NG/DL (ref 0.7–1.5)
TRIGL SERPL-MCNC: 70 MG/DL
TSH SERPL DL<=0.05 MIU/L-ACNC: 0.65 UIU/ML (ref 0.36–3.74)
VLDLC SERPL CALC-MCNC: 14 MG/DL
WBC # BLD AUTO: 6 K/UL (ref 4.6–13.2)

## 2024-02-07 PROCEDURE — 90677 PCV20 VACCINE IM: CPT | Performed by: STUDENT IN AN ORGANIZED HEALTH CARE EDUCATION/TRAINING PROGRAM

## 2024-02-07 PROCEDURE — 85027 COMPLETE CBC AUTOMATED: CPT

## 2024-02-07 PROCEDURE — 82043 UR ALBUMIN QUANTITATIVE: CPT

## 2024-02-07 PROCEDURE — 80061 LIPID PANEL: CPT

## 2024-02-07 PROCEDURE — 82570 ASSAY OF URINE CREATININE: CPT

## 2024-02-07 PROCEDURE — G0439 PPPS, SUBSEQ VISIT: HCPCS | Performed by: STUDENT IN AN ORGANIZED HEALTH CARE EDUCATION/TRAINING PROGRAM

## 2024-02-07 PROCEDURE — 84439 ASSAY OF FREE THYROXINE: CPT

## 2024-02-07 PROCEDURE — 1123F ACP DISCUSS/DSCN MKR DOCD: CPT | Performed by: STUDENT IN AN ORGANIZED HEALTH CARE EDUCATION/TRAINING PROGRAM

## 2024-02-07 PROCEDURE — 3078F DIAST BP <80 MM HG: CPT | Performed by: STUDENT IN AN ORGANIZED HEALTH CARE EDUCATION/TRAINING PROGRAM

## 2024-02-07 PROCEDURE — 80048 BASIC METABOLIC PNL TOTAL CA: CPT

## 2024-02-07 PROCEDURE — G0009 ADMIN PNEUMOCOCCAL VACCINE: HCPCS | Performed by: STUDENT IN AN ORGANIZED HEALTH CARE EDUCATION/TRAINING PROGRAM

## 2024-02-07 PROCEDURE — 83036 HEMOGLOBIN GLYCOSYLATED A1C: CPT

## 2024-02-07 PROCEDURE — 36415 COLL VENOUS BLD VENIPUNCTURE: CPT

## 2024-02-07 PROCEDURE — 84443 ASSAY THYROID STIM HORMONE: CPT

## 2024-02-07 PROCEDURE — 3074F SYST BP LT 130 MM HG: CPT | Performed by: STUDENT IN AN ORGANIZED HEALTH CARE EDUCATION/TRAINING PROGRAM

## 2024-02-07 NOTE — PROGRESS NOTES
Vanessa Galicia is a 73 y.o. female (: 1950) presenting to address:    Chief Complaint   Patient presents with    Establish Care       Vitals:    24 0827   BP: 121/77   Pulse: 86   Resp: 14   Temp: 97.7 °F (36.5 °C)   SpO2: 100%       \"Have you been to the ER, urgent care clinic since your last visit?  Hospitalized since your last visit?\"    NO    “Have you seen or consulted any other health care providers outside of [facility] since your last visit?”    NO       Immunizations Administered       Name Date Dose Route    Pneumococcal, PCV20, PREVNAR 20, (age 6w+), IM, 0.5mL 2024 0.5 mL Intramuscular    Site: Deltoid- Right    Lot: [id number]    NDC: 4272-4893-73                  
MG tablet TAKE 1 TABLET BY MOUTH TWO (2) TIMES A DAY. INDICATIONS: HIGH BLOOD PRESSURE Yes Segundo, Emely H, DO   empagliflozin (JARDIANCE) 25 MG tablet Take 1 tablet by mouth daily Indications: Diabetes Yes Segundo, Emely H, DO   Semaglutide 14 MG TABS Take 14 mg by mouth daily Indications: Diabetes Administer 30 minutes before the first food, beverage, or other medications of the day. Yes Segundo, Emely H, DO   rosuvastatin (CRESTOR) 5 MG tablet Take 1 tablet by mouth daily Indications: cholesterol Yes Segundo, Emely H, DO   blood glucose test strips (ASCENSIA AUTODISC VI;ONE TOUCH ULTRA TEST VI) strip 1 each by In Vitro route daily As needed. Yes Segundo, Emely H, DO   aspirin 81 MG EC tablet Take by mouth daily Yes Automatic Reconciliation, Ar   colesevelam (WELCHOL) 625 MG tablet Take 2 tablets by mouth 2 times daily (with meals) Yes Automatic Reconciliation, Ar   hydroxychloroquine (PLAQUENIL) 200 MG tablet Take 1 tablet by mouth daily Yes Automatic Reconciliation, Ar       CareTeam (Including outside providers/suppliers regularly involved in providing care):   Patient Care Team:  Sosa Melendez DO as PCP - General (Family Medicine)  Travis Simon MD as Physician     Reviewed and updated this visit:  Tobacco  Allergies  Meds  Problems  Med Hx  Surg Hx  Soc Hx  Fam Hx

## 2024-02-07 NOTE — PATIENT INSTRUCTIONS

## 2024-02-19 ENCOUNTER — HOSPITAL ENCOUNTER (OUTPATIENT)
Dept: WOMENS IMAGING | Facility: HOSPITAL | Age: 74
Discharge: HOME | End: 2024-02-22
Payer: MEDICARE

## 2024-02-19 DIAGNOSIS — Z12.31 SCREENING MAMMOGRAM FOR BREAST CANCER: ICD-10-CM

## 2024-02-19 PROCEDURE — 77063 BREAST TOMOSYNTHESIS BI: CPT

## 2024-04-03 DIAGNOSIS — E11.22 TYPE 2 DIABETES MELLITUS WITH DIABETIC CHRONIC KIDNEY DISEASE, UNSPECIFIED CKD STAGE, UNSPECIFIED WHETHER LONG TERM INSULIN USE (HCC): ICD-10-CM

## 2024-04-04 NOTE — TELEPHONE ENCOUNTER
Last visit: 2/7/24  Next visit:   Future Appointments   Date Time Provider Department Center   6/7/2024  8:00 AM Sosa Melendez DO BSMA BS AMB   2/24/2025 11:30 AM [facility] BX RM 1 [facility]     Last filled: 10/19/23; metformin 1000 mg tab; qty 90 w/1 refill

## 2024-05-09 DIAGNOSIS — I10 ESSENTIAL (PRIMARY) HYPERTENSION: ICD-10-CM

## 2024-05-09 DIAGNOSIS — N18.31 STAGE 3A CHRONIC KIDNEY DISEASE (HCC): ICD-10-CM

## 2024-05-09 DIAGNOSIS — E11.22 TYPE 2 DIABETES MELLITUS WITH DIABETIC CHRONIC KIDNEY DISEASE (HCC): ICD-10-CM

## 2024-05-09 DIAGNOSIS — E78.5 HYPERLIPIDEMIA, UNSPECIFIED HYPERLIPIDEMIA TYPE: ICD-10-CM

## 2024-05-09 NOTE — TELEPHONE ENCOUNTER
Received a fax from [facility] in [place], [place] for her jardiance, called too clarify that's where she needed refill sent to pt stated she got it filled when she was down there 1 month ago, pt states she has a supply already told pt I will disregard to refill request that came through via fax, and told pt to call back when she get down to her last 2 week supply so we can get a refill when needed.

## 2024-05-09 NOTE — TELEPHONE ENCOUNTER
Med Refill Request    Medication: Semaglutide 14 MG TABS   Last filled: 06/05/2023  Qty: 90  RF:3  Sig:  Take 14 mg by mouth daily Indications: Diabetes Administer 30 minutes before the first food, beverage, or other medications of the day.     Future Appointments   Date Time Provider Department Center   6/7/2024  8:00 AM Sosa Melendez DO [facility] AMB   2/24/2025 11:30 AM [facility] RM 1 [facility]

## 2024-05-20 DIAGNOSIS — E11.22 TYPE 2 DIABETES MELLITUS WITH DIABETIC CHRONIC KIDNEY DISEASE, UNSPECIFIED CKD STAGE, UNSPECIFIED WHETHER LONG TERM INSULIN USE (HCC): ICD-10-CM

## 2024-05-20 NOTE — TELEPHONE ENCOUNTER
Vanessa Galicia called for their medication refill.    Last Office visit:  2/7/2024    Last Filled: 7/3/2023; Qty 90 w/ 3 refills     Follow up visit:    Future Appointments   Date Time Provider Department Center   6/7/2024  8:00 AM Sosa Melendez DO [facility] AMB   2/24/2025 11:30 AM [facility] RM 1 [facility]

## 2024-05-21 DIAGNOSIS — E11.22 TYPE 2 DIABETES MELLITUS WITH CHRONIC KIDNEY DISEASE, WITHOUT LONG-TERM CURRENT USE OF INSULIN, UNSPECIFIED CKD STAGE (HCC): ICD-10-CM

## 2024-05-21 NOTE — TELEPHONE ENCOUNTER
Med Refill Request    Medication: blood glucose test strips (ASCENSIA AUTODISC VI;ONE TOUCH ULTRA TEST VI) strip   Last filled: 2023  Qty: 100  RF:11  Si each by In Vitro route daily As needed.     Future Appointments   Date Time Provider Department Center   2024  8:00 AM Sosa Melendez DO [facility] AMB   2025 11:30 AM [facility]

## 2024-06-03 ENCOUNTER — TELEPHONE (OUTPATIENT)
Dept: FAMILY MEDICINE CLINIC | Facility: CLINIC | Age: 74
End: 2024-06-03

## 2024-06-03 DIAGNOSIS — E11.22 TYPE 2 DIABETES MELLITUS WITH CHRONIC KIDNEY DISEASE, WITHOUT LONG-TERM CURRENT USE OF INSULIN, UNSPECIFIED CKD STAGE (HCC): ICD-10-CM

## 2024-06-03 DIAGNOSIS — E78.5 HYPERLIPIDEMIA, UNSPECIFIED: ICD-10-CM

## 2024-06-03 RX ORDER — ROSUVASTATIN CALCIUM 5 MG/1
5 TABLET, COATED ORAL DAILY
Qty: 90 TABLET | Refills: 3 | Status: SHIPPED | OUTPATIENT
Start: 2024-06-03

## 2024-06-03 NOTE — TELEPHONE ENCOUNTER
Pt called stating that she needs to have the One Touch Verio test strips sent to her pharmacy. Please advise. Unable to attach do not see medication in pt's chart

## 2024-06-03 NOTE — TELEPHONE ENCOUNTER
Requested Prescriptions     Pending Prescriptions Disp Refills    rosuvastatin (CRESTOR) 5 MG tablet 90 tablet 3     Sig: Take 1 tablet by mouth daily Indications: cholesterol      Last seen: 2/7/24  Next seen: 6/7/24    Last filled: 5/22/23 Qty 90 w/3 refills

## 2024-06-05 RX ORDER — GLUCOSAMINE HCL/CHONDROITIN SU 500-400 MG
CAPSULE ORAL
Qty: 100 STRIP | Refills: 3 | Status: SHIPPED | OUTPATIENT
Start: 2024-06-05

## 2024-06-06 SDOH — ECONOMIC STABILITY: FOOD INSECURITY: WITHIN THE PAST 12 MONTHS, YOU WORRIED THAT YOUR FOOD WOULD RUN OUT BEFORE YOU GOT MONEY TO BUY MORE.: NEVER TRUE

## 2024-06-06 SDOH — ECONOMIC STABILITY: INCOME INSECURITY: HOW HARD IS IT FOR YOU TO PAY FOR THE VERY BASICS LIKE FOOD, HOUSING, MEDICAL CARE, AND HEATING?: NOT VERY HARD

## 2024-06-06 SDOH — ECONOMIC STABILITY: FOOD INSECURITY: WITHIN THE PAST 12 MONTHS, THE FOOD YOU BOUGHT JUST DIDN'T LAST AND YOU DIDN'T HAVE MONEY TO GET MORE.: NEVER TRUE

## 2024-06-07 ENCOUNTER — OFFICE VISIT (OUTPATIENT)
Dept: FAMILY MEDICINE CLINIC | Facility: CLINIC | Age: 74
End: 2024-06-07
Payer: MEDICARE

## 2024-06-07 VITALS
SYSTOLIC BLOOD PRESSURE: 113 MMHG | HEART RATE: 78 BPM | OXYGEN SATURATION: 98 % | HEIGHT: 62 IN | BODY MASS INDEX: 28.49 KG/M2 | WEIGHT: 154.8 LBS | RESPIRATION RATE: 18 BRPM | DIASTOLIC BLOOD PRESSURE: 73 MMHG | TEMPERATURE: 97.8 F

## 2024-06-07 DIAGNOSIS — Z79.4 TYPE 2 DIABETES MELLITUS WITH STAGE 3B CHRONIC KIDNEY DISEASE, WITH LONG-TERM CURRENT USE OF INSULIN (HCC): Primary | ICD-10-CM

## 2024-06-07 DIAGNOSIS — N18.32 TYPE 2 DIABETES MELLITUS WITH STAGE 3B CHRONIC KIDNEY DISEASE, WITH LONG-TERM CURRENT USE OF INSULIN (HCC): Primary | ICD-10-CM

## 2024-06-07 DIAGNOSIS — E11.22 TYPE 2 DIABETES MELLITUS WITH STAGE 3B CHRONIC KIDNEY DISEASE, WITH LONG-TERM CURRENT USE OF INSULIN (HCC): Primary | ICD-10-CM

## 2024-06-07 DIAGNOSIS — E78.2 MIXED HYPERLIPIDEMIA: ICD-10-CM

## 2024-06-07 DIAGNOSIS — M35.9 SYSTEMIC INVOLVEMENT OF CONNECTIVE TISSUE, UNSPECIFIED (HCC): ICD-10-CM

## 2024-06-07 DIAGNOSIS — N18.31 CHRONIC KIDNEY DISEASE, STAGE 3A (HCC): ICD-10-CM

## 2024-06-07 LAB
GLUCOSE, POC: 111 MG/DL
HBA1C MFR BLD: 5.9 %

## 2024-06-07 PROCEDURE — 3044F HG A1C LEVEL LT 7.0%: CPT | Performed by: STUDENT IN AN ORGANIZED HEALTH CARE EDUCATION/TRAINING PROGRAM

## 2024-06-07 PROCEDURE — 3078F DIAST BP <80 MM HG: CPT | Performed by: STUDENT IN AN ORGANIZED HEALTH CARE EDUCATION/TRAINING PROGRAM

## 2024-06-07 PROCEDURE — 82962 GLUCOSE BLOOD TEST: CPT | Performed by: STUDENT IN AN ORGANIZED HEALTH CARE EDUCATION/TRAINING PROGRAM

## 2024-06-07 PROCEDURE — 1123F ACP DISCUSS/DSCN MKR DOCD: CPT | Performed by: STUDENT IN AN ORGANIZED HEALTH CARE EDUCATION/TRAINING PROGRAM

## 2024-06-07 PROCEDURE — 99214 OFFICE O/P EST MOD 30 MIN: CPT | Performed by: STUDENT IN AN ORGANIZED HEALTH CARE EDUCATION/TRAINING PROGRAM

## 2024-06-07 PROCEDURE — 83036 HEMOGLOBIN GLYCOSYLATED A1C: CPT | Performed by: STUDENT IN AN ORGANIZED HEALTH CARE EDUCATION/TRAINING PROGRAM

## 2024-06-07 PROCEDURE — 3074F SYST BP LT 130 MM HG: CPT | Performed by: STUDENT IN AN ORGANIZED HEALTH CARE EDUCATION/TRAINING PROGRAM

## 2024-06-07 ASSESSMENT — ENCOUNTER SYMPTOMS
SHORTNESS OF BREATH: 0
CHEST TIGHTNESS: 0
SINUS PAIN: 0

## 2024-06-07 NOTE — PROGRESS NOTES
Vanessa Galicia is a 73 y.o. female (: 1950) presenting to address:    Chief Complaint   Patient presents with    Medication Check       Vitals:    24 0805   BP: 113/73   Pulse: 78   Resp: 18   Temp: 97.8 °F (36.6 °C)   SpO2: 98%       \"Have you been to the ER, urgent care clinic since your last visit?  Hospitalized since your last visit?\"    NO    “Have you seen or consulted any other health care providers outside of [facility] since your last visit?”    NO             
tablet, Take 2 tablets by mouth 2 times daily (with meals), Disp: , Rfl:     hydroxychloroquine (PLAQUENIL) 200 MG tablet, Take 1 tablet by mouth daily, Disp: , Rfl:       Review of Systems   Constitutional:  Negative for activity change.   HENT:  Negative for congestion and sinus pain.    Respiratory:  Negative for chest tightness and shortness of breath.    Cardiovascular:  Negative for chest pain.   Neurological:  Negative for dizziness and seizures.   Psychiatric/Behavioral:  Negative for agitation and behavioral problems.        Physical Exam  Constitutional:       Appearance: Normal appearance.   HENT:      Head: Normocephalic and atraumatic.   Eyes:      Extraocular Movements: Extraocular movements intact.      Pupils: Pupils are equal, round, and reactive to light.   Pulmonary:      Effort: Pulmonary effort is normal. No respiratory distress.      Breath sounds: No wheezing.   Musculoskeletal:         General: No swelling.   Skin:     General: Skin is warm and dry.   Neurological:      General: No focal deficit present.      Mental Status: She is alert and oriented to person, place, and time.   Psychiatric:         Mood and Affect: Mood normal.         Behavior: Behavior normal.          ASSESSMENT and PLAN    Vanessa was seen today for medication check.    Diagnoses and all orders for this visit:    Type 2 diabetes mellitus with stage 3b chronic kidney disease, with long-term current use of insulin (HCC)  Comments:  continue current regimen, fu in 6 mo  Orders:  -     AMB POC HEMOGLOBIN A1C  -     AMB POC GLUCOSE BLOOD, BY GLUCOSE MONITORING DEVICE    Systemic involvement of connective tissue, unspecified (HCC)    Chronic kidney disease, stage 3a (HCC)  Comments:  continue ramipril    Mixed hyperlipidemia  Comments:  continue current regimen                 Anitha Leggett D.O.      PLEASE NOTE:   This document has been produced using voice recognition software. Unrecognized errors in transcription may

## 2024-06-10 ENCOUNTER — TELEPHONE (OUTPATIENT)
Dept: FAMILY MEDICINE CLINIC | Facility: CLINIC | Age: 74
End: 2024-06-10

## 2024-06-10 RX ORDER — GLUCOSAMINE HCL/CHONDROITIN SU 500-400 MG
CAPSULE ORAL
Qty: 100 STRIP | Refills: 3 | Status: SHIPPED | OUTPATIENT
Start: 2024-06-10

## 2024-06-10 NOTE — TELEPHONE ENCOUNTER
Pt called stating that she is still waiting for the Verio Test Strips. Spoke with provider who stated that she would re send medication

## 2024-08-15 RX ORDER — LANCETS 33 GAUGE
1 EACH MISCELLANEOUS 3 TIMES DAILY PRN
Qty: 100 EACH | Refills: 11 | Status: SHIPPED | OUTPATIENT
Start: 2024-08-15

## 2024-08-15 NOTE — TELEPHONE ENCOUNTER
Requested Prescriptions     Pending Prescriptions Disp Refills    OneTouch Delica Lancets 33G MISC 100 each 11     Si each by Other route 3 times daily as needed (sugar monitoring)     Last seen: 24  Next seen: 24    Last filled: 23 Qty 100 w/11 refills

## 2024-09-20 RX ORDER — RAMIPRIL 10 MG/1
10 CAPSULE ORAL DAILY
Qty: 90 CAPSULE | Refills: 1 | Status: SHIPPED | OUTPATIENT
Start: 2024-09-20

## 2024-10-04 DIAGNOSIS — E11.22 TYPE 2 DIABETES MELLITUS WITH DIABETIC CHRONIC KIDNEY DISEASE, UNSPECIFIED CKD STAGE, UNSPECIFIED WHETHER LONG TERM INSULIN USE (HCC): ICD-10-CM

## 2024-10-04 DIAGNOSIS — I10 ESSENTIAL (PRIMARY) HYPERTENSION: ICD-10-CM

## 2024-10-04 RX ORDER — CARVEDILOL 6.25 MG/1
6.25 TABLET ORAL 2 TIMES DAILY
Qty: 180 TABLET | Refills: 1 | Status: SHIPPED | OUTPATIENT
Start: 2024-10-04

## 2024-10-04 NOTE — TELEPHONE ENCOUNTER
Requested Prescriptions     Pending Prescriptions Disp Refills    carvedilol (COREG) 6.25 MG tablet 180 tablet 3    metFORMIN (GLUCOPHAGE) 1000 MG tablet 90 tablet 0     Sig: Take 1 tablet by mouth every evening Indications: Diabetes     Last seen: 6/7/24  Next seen: 12/9/24    Last filled: 8/7/23 Qty 180 w/3 refills  4/5/24 Qty 90 w/0 refills

## 2024-10-16 DIAGNOSIS — E55.9 VITAMIN D DEFICIENCY, UNSPECIFIED: ICD-10-CM

## 2024-10-21 RX ORDER — ERGOCALCIFEROL 1.25 MG/1
50000 CAPSULE, LIQUID FILLED ORAL WEEKLY
Qty: 12 CAPSULE | Refills: 3 | Status: SHIPPED | OUTPATIENT
Start: 2024-10-21

## 2024-12-09 ENCOUNTER — OFFICE VISIT (OUTPATIENT)
Dept: FAMILY MEDICINE CLINIC | Facility: CLINIC | Age: 74
End: 2024-12-09
Payer: MEDICARE

## 2024-12-09 VITALS
SYSTOLIC BLOOD PRESSURE: 120 MMHG | BODY MASS INDEX: 29.26 KG/M2 | HEART RATE: 79 BPM | OXYGEN SATURATION: 100 % | TEMPERATURE: 97.7 F | WEIGHT: 159 LBS | HEIGHT: 62 IN | DIASTOLIC BLOOD PRESSURE: 77 MMHG | RESPIRATION RATE: 16 BRPM

## 2024-12-09 DIAGNOSIS — I10 PRIMARY HYPERTENSION: ICD-10-CM

## 2024-12-09 DIAGNOSIS — Z13.220 SCREENING FOR LIPID DISORDERS: ICD-10-CM

## 2024-12-09 DIAGNOSIS — E55.9 VITAMIN D DEFICIENCY, UNSPECIFIED: ICD-10-CM

## 2024-12-09 DIAGNOSIS — E11.22 TYPE 2 DIABETES MELLITUS WITH STAGE 3A CHRONIC KIDNEY DISEASE, WITHOUT LONG-TERM CURRENT USE OF INSULIN (HCC): Primary | ICD-10-CM

## 2024-12-09 DIAGNOSIS — N18.31 TYPE 2 DIABETES MELLITUS WITH STAGE 3A CHRONIC KIDNEY DISEASE, WITHOUT LONG-TERM CURRENT USE OF INSULIN (HCC): Primary | ICD-10-CM

## 2024-12-09 LAB — HBA1C MFR BLD: 6.2 %

## 2024-12-09 PROCEDURE — 3044F HG A1C LEVEL LT 7.0%: CPT | Performed by: STUDENT IN AN ORGANIZED HEALTH CARE EDUCATION/TRAINING PROGRAM

## 2024-12-09 PROCEDURE — 83036 HEMOGLOBIN GLYCOSYLATED A1C: CPT | Performed by: STUDENT IN AN ORGANIZED HEALTH CARE EDUCATION/TRAINING PROGRAM

## 2024-12-09 PROCEDURE — 1123F ACP DISCUSS/DSCN MKR DOCD: CPT | Performed by: STUDENT IN AN ORGANIZED HEALTH CARE EDUCATION/TRAINING PROGRAM

## 2024-12-09 PROCEDURE — 1159F MED LIST DOCD IN RCRD: CPT | Performed by: STUDENT IN AN ORGANIZED HEALTH CARE EDUCATION/TRAINING PROGRAM

## 2024-12-09 PROCEDURE — 1125F AMNT PAIN NOTED PAIN PRSNT: CPT | Performed by: STUDENT IN AN ORGANIZED HEALTH CARE EDUCATION/TRAINING PROGRAM

## 2024-12-09 PROCEDURE — 3074F SYST BP LT 130 MM HG: CPT | Performed by: STUDENT IN AN ORGANIZED HEALTH CARE EDUCATION/TRAINING PROGRAM

## 2024-12-09 PROCEDURE — 99214 OFFICE O/P EST MOD 30 MIN: CPT | Performed by: STUDENT IN AN ORGANIZED HEALTH CARE EDUCATION/TRAINING PROGRAM

## 2024-12-09 PROCEDURE — 3078F DIAST BP <80 MM HG: CPT | Performed by: STUDENT IN AN ORGANIZED HEALTH CARE EDUCATION/TRAINING PROGRAM

## 2024-12-09 PROCEDURE — 1160F RVW MEDS BY RX/DR IN RCRD: CPT | Performed by: STUDENT IN AN ORGANIZED HEALTH CARE EDUCATION/TRAINING PROGRAM

## 2024-12-09 ASSESSMENT — ENCOUNTER SYMPTOMS
SHORTNESS OF BREATH: 0
SINUS PAIN: 0
CHEST TIGHTNESS: 0

## 2024-12-09 NOTE — PROGRESS NOTES
Vanessa Galicia is a 74 y.o. female (: 1950) presenting to address:    Chief Complaint   Patient presents with    Follow-up       Vitals:    24 0827   BP: 120/77   Pulse: 79   Resp: 16   Temp: 97.7 °F (36.5 °C)   SpO2: 100%       \"Have you been to the ER, urgent care clinic since your last visit?  Hospitalized since your last visit?\"    NO    “Have you seen or consulted any other health care providers outside of [facility] since your last visit?”    NO

## 2024-12-09 NOTE — PROGRESS NOTES
[facility]    HISTORY OF PRESENT ILLNESS  Vanessa Galicia  is a 74 y.o. y.o. female here for FU.    TYPE 2 DIABETES MELLITUS  Non-Insulin Dependent  Reports Compliance with Medication  metFORMIN - 1000 MG   Jardiance 25mg, rybelsus 14  Hemoglobin A1C   Date Value Ref Range Status   02/07/2024 5.8 (H) 4.2 - 5.6 % Final     Comment:     (NOTE)  HbA1C Interpretive Ranges  <5.7              Normal  5.7 - 6.4         Consider Prediabetes  >6.5              Consider Diabetes       Hemoglobin A1C, POC   Date Value Ref Range Status   12/09/2024 6.2 % Final       HYPERTENSION, Essential  Reports Compliance with meds ramipril 10, carvedilol 6.25  Denies Chest pain, shortness of breath, lower extremity edema, vision changes, change in urination.    Lab Results   Component Value Date    MALBCR  02/07/2024     Cannot calculate ratio due to microalbumin result outside reportable range.     02/07/2024    K 4.5 02/07/2024     02/07/2024    CO2 29 02/07/2024    GLUCOSE 101 (H) 02/07/2024    BUN 23 (H) 02/07/2024    CREATININE 1.25 02/07/2024    CALCIUM 9.9 02/07/2024    ALT 22 11/04/2022    AST 12 11/04/2022    ALKPHOS 57 11/04/2022       HLD  -crestor 5    Vitamin D deficiency  -taking vitamin D   -extremely low 2022    Lupus  -seeing Rheumatology Dr. Gonzáles  -taking plaquenil, colesevelam         Mr#: [id number]      Past Medical History:   Diagnosis Date    Angina pectoris (HCC)     w/ silent ischemia    Breast cancer (HCC)     Cancer (HCC) 1987    breast    Chronic insomnia     Diabetes (HCC)     Exogenous obesity     HTN (hypertension) 4/5/2010    Hypertension     IBS (irritable bowel syndrome)     Menopause     SLE (systemic lupus erythematosus) (HCC)        Past Surgical History:   Procedure Laterality Date    BREAST BIOPSY  2003    right breast neg.    BREAST RECONSTRUCTION Left     BREAST RECONSTRUCTION  1988    left breast    BREAST REDUCTION SURGERY Right 1987    CARDIAC CATHETERIZATION

## 2025-01-15 DIAGNOSIS — E11.22 TYPE 2 DIABETES MELLITUS WITH DIABETIC CHRONIC KIDNEY DISEASE, UNSPECIFIED CKD STAGE, UNSPECIFIED WHETHER LONG TERM INSULIN USE (HCC): ICD-10-CM

## 2025-01-15 DIAGNOSIS — I10 ESSENTIAL (PRIMARY) HYPERTENSION: ICD-10-CM

## 2025-01-15 RX ORDER — CARVEDILOL 6.25 MG/1
6.25 TABLET ORAL 2 TIMES DAILY
Qty: 180 TABLET | Refills: 1 | Status: SHIPPED | OUTPATIENT
Start: 2025-01-15

## 2025-01-15 NOTE — TELEPHONE ENCOUNTER
Requested Prescriptions     Pending Prescriptions Disp Refills    carvedilol (COREG) 6.25 MG tablet 180 tablet 3     Sig: Take 1 tablet by mouth 2 times daily    metFORMIN (GLUCOPHAGE) 1000 MG tablet 90 tablet 3     Sig: Take 1 tablet by mouth every evening Indications: Diabetes     Last seen: 12/9/24  Next seen: 3/10/25    Last filled: 10/4/24 Qty 90 w/1 refill

## 2025-02-10 RX ORDER — RAMIPRIL 10 MG/1
10 CAPSULE ORAL DAILY
Qty: 90 CAPSULE | Refills: 1 | Status: SHIPPED | OUTPATIENT
Start: 2025-02-10

## 2025-02-10 NOTE — TELEPHONE ENCOUNTER
Requested Prescriptions     Pending Prescriptions Disp Refills    ramipril (ALTACE) 10 MG capsule 90 capsule 1     Sig: Take 1 capsule by mouth daily FOR HIGH BLOOD PRESSURE AND KIDNEY PROTECTION     Last seen: 12/9/24  Next seen: 3/10/25    Last filled: 9/20/24 Qty 90 w/1 refill

## 2025-02-24 ENCOUNTER — HOSPITAL ENCOUNTER (OUTPATIENT)
Dept: WOMENS IMAGING | Facility: HOSPITAL | Age: 75
Discharge: HOME | End: 2025-02-27
Payer: MEDICARE

## 2025-02-24 DIAGNOSIS — Z12.31 ENCOUNTER FOR SCREENING MAMMOGRAM FOR MALIGNANT NEOPLASM OF BREAST: ICD-10-CM

## 2025-02-24 PROCEDURE — 77063 BREAST TOMOSYNTHESIS BI: CPT

## 2025-03-03 ENCOUNTER — HOSPITAL ENCOUNTER (OUTPATIENT)
Facility: HOSPITAL | Age: 75
Setting detail: SPECIMEN
Discharge: HOME | End: 2025-03-06
Payer: MEDICARE

## 2025-03-03 DIAGNOSIS — N18.31 TYPE 2 DIABETES MELLITUS WITH STAGE 3A CHRONIC KIDNEY DISEASE, WITHOUT LONG-TERM CURRENT USE OF INSULIN (HCC): ICD-10-CM

## 2025-03-03 DIAGNOSIS — E55.9 VITAMIN D DEFICIENCY, UNSPECIFIED: ICD-10-CM

## 2025-03-03 DIAGNOSIS — E11.22 TYPE 2 DIABETES MELLITUS WITH STAGE 3A CHRONIC KIDNEY DISEASE, WITHOUT LONG-TERM CURRENT USE OF INSULIN (HCC): ICD-10-CM

## 2025-03-03 DIAGNOSIS — Z13.220 SCREENING FOR LIPID DISORDERS: ICD-10-CM

## 2025-03-03 DIAGNOSIS — I10 PRIMARY HYPERTENSION: ICD-10-CM

## 2025-03-03 LAB
25(OH)D3 SERPL-MCNC: 86.5 NG/ML (ref 30–100)
ANION GAP SERPL CALC-SCNC: 8 MMOL/L (ref 3–18)
BUN SERPL-MCNC: 24 MG/DL (ref 7–18)
BUN/CREAT SERPL: 18 (ref 12–20)
CALCIUM SERPL-MCNC: 9.2 MG/DL (ref 8.5–10.1)
CHLORIDE SERPL-SCNC: 107 MMOL/L (ref 100–111)
CHOLEST SERPL-MCNC: 112 MG/DL
CO2 SERPL-SCNC: 23 MMOL/L (ref 21–32)
CREAT SERPL-MCNC: 1.33 MG/DL (ref 0.6–1.3)
CREAT UR-MCNC: 107 MG/DL (ref 30–125)
ERYTHROCYTE [DISTWIDTH] IN BLOOD BY AUTOMATED COUNT: 14.5 % (ref 11.6–14.5)
EST. AVERAGE GLUCOSE BLD GHB EST-MCNC: 128 MG/DL
GLUCOSE SERPL-MCNC: 112 MG/DL (ref 74–99)
HBA1C MFR BLD: 6.1 % (ref 4.2–5.6)
HCT VFR BLD AUTO: 38 % (ref 35–45)
HDLC SERPL-MCNC: 71 MG/DL (ref 40–60)
HDLC SERPL: 1.6 (ref 0–5)
HGB BLD-MCNC: 11.6 G/DL (ref 12–16)
LDLC SERPL CALC-MCNC: 27.8 MG/DL (ref 0–100)
LIPID PANEL: ABNORMAL
MCH RBC QN AUTO: 25.4 PG (ref 24–34)
MCHC RBC AUTO-ENTMCNC: 30.5 G/DL (ref 31–37)
MCV RBC AUTO: 83.2 FL (ref 78–100)
MICROALBUMIN UR-MCNC: 1.41 MG/DL (ref 0–3)
MICROALBUMIN/CREAT UR-RTO: 13 MG/G (ref 0–30)
NRBC # BLD: 0 K/UL (ref 0–0.01)
NRBC BLD-RTO: 0 PER 100 WBC
PLATELET # BLD AUTO: 285 K/UL (ref 135–420)
PMV BLD AUTO: 9.8 FL (ref 9.2–11.8)
POTASSIUM SERPL-SCNC: 4.7 MMOL/L (ref 3.5–5.5)
RBC # BLD AUTO: 4.57 M/UL (ref 4.2–5.3)
SODIUM SERPL-SCNC: 138 MMOL/L (ref 136–145)
TRIGL SERPL-MCNC: 66 MG/DL
VLDLC SERPL CALC-MCNC: 13.2 MG/DL
WBC # BLD AUTO: 6 K/UL (ref 4.6–13.2)

## 2025-03-03 PROCEDURE — 82570 ASSAY OF URINE CREATININE: CPT

## 2025-03-03 PROCEDURE — 80061 LIPID PANEL: CPT

## 2025-03-03 PROCEDURE — 80048 BASIC METABOLIC PNL TOTAL CA: CPT

## 2025-03-03 PROCEDURE — 36415 COLL VENOUS BLD VENIPUNCTURE: CPT

## 2025-03-03 PROCEDURE — 83036 HEMOGLOBIN GLYCOSYLATED A1C: CPT

## 2025-03-03 PROCEDURE — 85027 COMPLETE CBC AUTOMATED: CPT

## 2025-03-03 PROCEDURE — 82306 VITAMIN D 25 HYDROXY: CPT

## 2025-03-03 PROCEDURE — 82043 UR ALBUMIN QUANTITATIVE: CPT

## 2025-03-09 SDOH — ECONOMIC STABILITY: INCOME INSECURITY: IN THE LAST 12 MONTHS, WAS THERE A TIME WHEN YOU WERE NOT ABLE TO PAY THE MORTGAGE OR RENT ON TIME?: NO

## 2025-03-09 SDOH — ECONOMIC STABILITY: FOOD INSECURITY: WITHIN THE PAST 12 MONTHS, YOU WORRIED THAT YOUR FOOD WOULD RUN OUT BEFORE YOU GOT MONEY TO BUY MORE.: NEVER TRUE

## 2025-03-09 SDOH — ECONOMIC STABILITY: FOOD INSECURITY: WITHIN THE PAST 12 MONTHS, THE FOOD YOU BOUGHT JUST DIDN'T LAST AND YOU DIDN'T HAVE MONEY TO GET MORE.: NEVER TRUE

## 2025-03-09 SDOH — HEALTH STABILITY: PHYSICAL HEALTH: ON AVERAGE, HOW MANY MINUTES DO YOU ENGAGE IN EXERCISE AT THIS LEVEL?: 10 MIN

## 2025-03-09 SDOH — ECONOMIC STABILITY: TRANSPORTATION INSECURITY
IN THE PAST 12 MONTHS, HAS THE LACK OF TRANSPORTATION KEPT YOU FROM MEDICAL APPOINTMENTS OR FROM GETTING MEDICATIONS?: NO

## 2025-03-09 SDOH — HEALTH STABILITY: PHYSICAL HEALTH: ON AVERAGE, HOW MANY DAYS PER WEEK DO YOU ENGAGE IN MODERATE TO STRENUOUS EXERCISE (LIKE A BRISK WALK)?: 2 DAYS

## 2025-03-09 ASSESSMENT — PATIENT HEALTH QUESTIONNAIRE - PHQ9
1. LITTLE INTEREST OR PLEASURE IN DOING THINGS: NOT AT ALL
SUM OF ALL RESPONSES TO PHQ QUESTIONS 1-9: 0
2. FEELING DOWN, DEPRESSED OR HOPELESS: NOT AT ALL
SUM OF ALL RESPONSES TO PHQ QUESTIONS 1-9: 0

## 2025-03-09 ASSESSMENT — LIFESTYLE VARIABLES
HOW MANY STANDARD DRINKS CONTAINING ALCOHOL DO YOU HAVE ON A TYPICAL DAY: 0
HOW OFTEN DO YOU HAVE A DRINK CONTAINING ALCOHOL: NEVER
HOW MANY STANDARD DRINKS CONTAINING ALCOHOL DO YOU HAVE ON A TYPICAL DAY: PATIENT DOES NOT DRINK
HOW OFTEN DO YOU HAVE A DRINK CONTAINING ALCOHOL: 1
HOW OFTEN DO YOU HAVE SIX OR MORE DRINKS ON ONE OCCASION: 1

## 2025-03-10 ENCOUNTER — OFFICE VISIT (OUTPATIENT)
Dept: FAMILY MEDICINE CLINIC | Facility: CLINIC | Age: 75
End: 2025-03-10

## 2025-03-10 VITALS
WEIGHT: 157.8 LBS | OXYGEN SATURATION: 100 % | RESPIRATION RATE: 12 BRPM | TEMPERATURE: 97.4 F | DIASTOLIC BLOOD PRESSURE: 71 MMHG | BODY MASS INDEX: 29.04 KG/M2 | HEART RATE: 84 BPM | HEIGHT: 62 IN | SYSTOLIC BLOOD PRESSURE: 117 MMHG

## 2025-03-10 DIAGNOSIS — Z23 NEED FOR PROPHYLACTIC VACCINATION AGAINST DIPHTHERIA-TETANUS-PERTUSSIS (DTP): ICD-10-CM

## 2025-03-10 DIAGNOSIS — E11.22 TYPE 2 DIABETES MELLITUS WITH STAGE 3B CHRONIC KIDNEY DISEASE, WITH LONG-TERM CURRENT USE OF INSULIN (HCC): ICD-10-CM

## 2025-03-10 DIAGNOSIS — Z79.4 TYPE 2 DIABETES MELLITUS WITH STAGE 3B CHRONIC KIDNEY DISEASE, WITH LONG-TERM CURRENT USE OF INSULIN (HCC): ICD-10-CM

## 2025-03-10 DIAGNOSIS — Z00.00 MEDICARE ANNUAL WELLNESS VISIT, SUBSEQUENT: Primary | ICD-10-CM

## 2025-03-10 DIAGNOSIS — Z23 NEED FOR PROPHYLACTIC VACCINATION AND INOCULATION AGAINST VARICELLA: ICD-10-CM

## 2025-03-10 DIAGNOSIS — I10 PRIMARY HYPERTENSION: ICD-10-CM

## 2025-03-10 DIAGNOSIS — N18.32 TYPE 2 DIABETES MELLITUS WITH STAGE 3B CHRONIC KIDNEY DISEASE, WITH LONG-TERM CURRENT USE OF INSULIN (HCC): ICD-10-CM

## 2025-03-10 PROBLEM — N18.31 STAGE 3A CHRONIC KIDNEY DISEASE (HCC): Status: RESOLVED | Noted: 2021-01-15 | Resolved: 2025-03-10

## 2025-03-10 LAB — HBA1C MFR BLD: 6.1 %

## 2025-03-10 ASSESSMENT — ENCOUNTER SYMPTOMS
SINUS PAIN: 0
SHORTNESS OF BREATH: 0
CHEST TIGHTNESS: 0

## 2025-03-10 NOTE — PATIENT INSTRUCTIONS
make half your plate fruits and vegetables, one-fourth whole grains, and one-fourth lean proteins. Try including dairy with your meals.   Eat more fruits and vegetables. Try to have them with most meals and snacks.   Foods for healthy eating        Fruits   These can be fresh, frozen, canned, or dried.  Try to choose whole fruit rather than fruit juice.  Eat a variety of colors.        Vegetables   These can be fresh, frozen, canned, or dried.  Beans, peas, and lentils count too.        Whole grains   Choose whole-grain breads, cereals, and noodles.  Try brown rice.        Lean proteins   These can include lean meat, poultry, fish, and eggs.  You can also have tofu, beans, peas, lentils, nuts, and seeds.        Dairy   Try milk, yogurt, and cheese.  Choose low-fat or fat-free when you can.  If you need to, use lactose-free milk or fortified plant-based milk products, such as soy milk.        Water   Drink water when you're thirsty.  Limit sugar-sweetened drinks, including soda, fruit drinks, and sports drinks.  Where can you learn more?  Go to https://www.Presentain.net/patientEd and enter T756 to learn more about \"Eating Healthy Foods: Care Instructions.\"  Current as of: September 20, 2023  Content Version: 14.3  © 2024 [employer].   Care instructions adapted under license by [employer]. If you have questions about a medical condition or this instruction, always ask your healthcare professional. [employer] disclaims any warranty or liability for your use of this information.         A Healthy Heart: Care Instructions  Overview     Coronary artery disease, also called heart disease, occurs when a substance called plaque builds up in the vessels that supply oxygen-rich blood to your heart muscle. This can narrow the blood vessels and reduce blood flow. A heart attack happens when blood flow is completely blocked. A high-fat diet, smoking, and other factors increase the risk of heart

## 2025-03-10 NOTE — PROGRESS NOTES
Vanessa Galicia is a 74 y.o. female (: 1950) presenting to address:    Chief Complaint   Patient presents with    Medicare AWV       Vitals:    03/10/25 0812   BP: 117/71   Pulse: 84   Resp: 12   Temp: 97.4 °F (36.3 °C)   SpO2: 100%       \"Have you been to the ER, urgent care clinic since your last visit?  Hospitalized since your last visit?\"    NO    “Have you seen or consulted any other health care providers outside of [facility] since your last visit?”    Yes, rheumatology, week of 3/3/2025

## 2025-03-10 NOTE — PROGRESS NOTES
Medicare Annual Wellness Visit    Vanessa Galicia is here for Medicare AWV    Assessment & Plan   Medicare annual wellness visit, subsequent  Type 2 diabetes mellitus with stage 3b chronic kidney disease, with long-term current use of insulin (HCC)  Comments:  continue current regimen, fu in 6 mo, performed DM foot exam  Need for prophylactic vaccination against diphtheria-tetanus-pertussis (DTP)  -     Tdap (ADACEL) 5-2-15.5 LF-MCG/0.5 injection; Inject 0.5 mLs into the muscle once for 1 dose, Disp-0.5 mL, R-0Print  Need for prophylactic vaccination and inoculation against varicella  -     zoster recombinant adjuvanted vaccine (SHINGRIX) 50 MCG/0.5ML SUSR injection; Inject 0.5 mLs into the muscle once for 1 dose, Disp-0.5 mL, R-0Print  Primary hypertension  Comments:  well controlled     Return in 6 months (on 9/10/2025) for DM.     Subjective       Patient's complete Health Risk Assessment and screening values have been reviewed and are found in Flowsheets. The following problems were reviewed today and where indicated follow up appointments were made and/or referrals ordered.    Positive Risk Factor Screenings with Interventions:              Inactivity:  On average, how many days per week do you engage in moderate to strenuous exercise (like a brisk walk)?: (Patient-Rptd) 2 days (!) Abnormal  On average, how many minutes do you engage in exercise at this level?: (Patient-Rptd) 10 min  Interventions:  See AVS for additional education material    Poor Eating Habits/Diet:  Do you eat balanced/healthy meals regularly?: (!) (Patient-Rptd) No  Interventions:  See AVS for additional education material     Dentist Screen:  Have you seen the dentist within the past year?: (!) (Patient-Rptd) No    Intervention:  See AVS for additional education material      Safety:  Do you have non-slip mats or non-slip surfaces or shower bars or grab bars in your shower or bathtub?: (!) (Patient-Rptd) No  Interventions:  See AVS for

## 2025-03-10 NOTE — PROGRESS NOTES
[facility]    HISTORY OF PRESENT ILLNESS  Vanessa Galicia  is a 74 y.o. y.o. female here for FU and MWV.    TYPE 2 DIABETES MELLITUS  Non-Insulin Dependent  Reports Compliance with Medication  metFORMIN - 1000 MG   Jardiance 25mg, rybelsus 14   Exercise:  Diet:  Last Dilated Eye Exam:   Foot Exam: Visual inspection:  Deformity/amputation: absent  Skin lesions/pre-ulcerative calluses: absent  Edema: right- negative, left- negative    Sensory exam:  Monofilament sensation: normal  (minimum of 5 random plantar locations tested, avoiding callused areas - > 1 area with absence of sensation is + for neuropathy)    Plus at least one of the following:  Pulses: normal,   Pinprick: Intact  Proprioception: N/A  Vibration (128 Hz): N/A    Hypoglycemia: Denies   Hemoglobin A1C   Date Value Ref Range Status   03/03/2025 6.1 (H) 4.2 - 5.6 % Final     Comment:     (NOTE)  HbA1C Interpretive Ranges  <5.7              Normal  5.7 - 6.4         Consider Prediabetes  >6.5              Consider Diabetes       Hemoglobin A1C, POC   Date Value Ref Range Status   03/10/2025 6.1 % Final       HYPERTENSION, Essential  Reports Compliance with meds ramipril 10, carvedilol 6.25   Denies Chest pain, shortness of breath, lower extremity edema, vision changes, change in urination.    Lab Results   Component Value Date     03/03/2025    K 4.7 03/03/2025     03/03/2025    CO2 23 03/03/2025    GLUCOSE 112 (H) 03/03/2025    BUN 24 (H) 03/03/2025    CREATININE 1.33 (H) 03/03/2025    CALCIUM 9.2 03/03/2025    ALT 22 11/04/2022    AST 12 11/04/2022    ALKPHOS 57 11/04/2022     HLD  -crestor 5     Vitamin D deficiency  -taking vitamin D   -extremely low 2022     Lupus  -seeing Rheumatology Dr. Gonzáles  -taking plaquenil, colesevelam      Mr#: [id number]      Past Medical History:   Diagnosis Date    Angina pectoris     w/ silent ischemia    Breast cancer (HCC)     Cancer (HCC) 1987    breast    Chronic insomnia     Diabetes

## 2025-04-03 NOTE — MR AVS SNAPSHOT
Visit Information Date & Time Provider Department Dept. Phone Encounter #  
 12/4/2017 11:15 AM Sherman Whitmore, [facility] [phone] [id number] Follow-up Instructions Return in about 3 months (around 3/4/2018) for rov. Follow-up and Disposition History Upcoming Health Maintenance Date Due  
 FOOT EXAM Q1 10/29/1962 EYE EXAM RETINAL OR DILATED Q1 10/29/1962 FOBT Q 1 YEAR AGE 50-75 10/29/2002 ZOSTER VACCINE AGE 60> 8/29/2012 GLAUCOMA SCREENING Q2Y 10/29/2017 OSTEOPOROSIS SCREENING (DEXA) 10/29/2017 HEMOGLOBIN A1C Q6M 5/20/2018 Pneumococcal 65+ Low/Medium Risk (2 of 2 - PPSV23) 11/20/2018 MICROALBUMIN Q1 11/20/2018 LIPID PANEL Q1 11/20/2018 MEDICARE YEARLY EXAM 11/21/2018 BREAST CANCER SCRN MAMMOGRAM 1/17/2019 DTaP/Tdap/Td series (2 - Td) 4/5/2020 Allergies as of 12/4/2017  Review Complete On: 12/4/2017 By: Sherman Whitmore MD  
  
 Severity Noted Reaction Type Reactions Penicillins  04/05/2010    Hives Current Immunizations  Reviewed on 11/20/2017 Name Date Hepatitis A Vaccine 10/20/2006, 1/9/2006 Influenza High Dose Vaccine PF 8/25/2017 Influenza Vaccine Whole 10/5/2009 Pneumococcal Conjugate (PCV-13) 11/20/2017 TD Vaccine 4/5/2006 TDAP Vaccine 4/5/2010 ZZZ-RETIRED (DO NOT USE) Pneumococcal Vaccine (Unspecified Type) 2/18/2004 Not reviewed this visit You Were Diagnosed With   
  
 Codes Comments Type 2 diabetes mellitus without complication, without long-term current use of insulin (HCC)    -  Primary ICD-10-CM: E11.9 ICD-9-CM: 250.00 Essential hypertension     ICD-10-CM: I10 
ICD-9-CM: 401.9 Systemic lupus erythematosus, unspecified SLE type, unspecified organ involvement status ([facility] [address]     ICD-10-CM: M32.9 ICD-9-CM: 710.0 Irritable bowel syndrome with diarrhea     ICD-10-CM: K58.0 ICD-9-CM: 645.6 Class 2 obesity due to excess calories with serious comorbidity and body mass index (BMI) of 38.0 to 38.9 in adult     ICD-10-CM: E66.09, Z68.38 
ICD-9-CM: 278.00, V85.38 Vitals BP Pulse Temp Resp Height(growth percentile) Weight(growth percentile) 135/75 88 98 °F (36.7 °C) (Oral) 20 5' 2\" (1.575 m) 211 lb 12.8 oz (96.1 kg) SpO2 BMI OB Status Smoking Status 98% 38.74 kg/m2 Menopause Never Smoker BMI and BSA Data Body Mass Index Body Surface Area 38.74 kg/m 2 2.05 m 2 Preferred Pharmacy Pharmacy Name Phone [facility] [address] [place] [address] [phone]. [phone] Your Updated Medication List  
  
   
This list is accurate as of: 12/4/17 12:05 PM.  Always use your most recent med list.  
  
  
  
  
 aspirin delayed-release 81 mg tablet Take  by mouth daily. Blood Pressure Monitor Kit Commonly known as:  BLOOD PRESSURE KIT Use twice daily  
  
 glipiZIDE 10 mg tablet Commonly known as:  [place] Take 10 mg by mouth two (2) times a day. hydroCHLOROthiazide 12.5 mg tablet Commonly known as:  HYDRODIURIL Take 12.5 mg by mouth daily. ibuprofen 600 mg tablet Commonly known as:  MOTRIN Take 1 Tab by mouth every six (6) hours as needed. JANUMET -1,000 mg Tm24 Generic drug:  SITagliptin-metFORMIN Take 1 Tab by mouth daily. PLAQUENIL 200 mg tablet Generic drug:  hydroxychloroquine Take 200 mg by mouth daily. * ALTACE 10 mg capsule Generic drug:  ramipril Take 10 mg by mouth daily. * ramipril 10 mg capsule Commonly known as:  ALTACE  
TAKE ONE CAPSULE BY MOUTH DAILY WELCHOL 625 mg tablet Generic drug:  colesevelam  
Take 1,875 mg by mouth two (2) times daily (with meals). * Notice: This list has 2 medication(s) that are the same as other medications prescribed for you.  Read the directions carefully, and ask your doctor or other care provider to review them with you. Prescriptions Sent to Pharmacy Refills Blood Pressure Monitor (BLOOD PRESSURE KIT) kit 0 Sig: Use twice daily Class: Normal  
 Pharmacy: [facility]/pharmacy #[id number]- [address] Ph #: [phone] JANUMET -1,000 mg TM24 3 Sig: Take 1 Tab by mouth daily. Class: Normal  
 Pharmacy: [facility]/pharmacy #[id number]- [address] Ph #: [phone] Route: Oral  
  
We Performed the Following  DIABETES FOOT EXAM [7 Custom] Follow-up Instructions Return in about 3 months (around 3/4/2018) for rov. Introducing [facility]! Dear Ángel Tenorio: Thank you for requesting a [employer] account. Our records indicate that you already have an active [employer] account. You can access your account anytime at [email or web address] Did you know that you can access your hospital and ER discharge instructions at any time in Yo[employer]? You can also review all of your test results from your hospital stay or ER visit. Additional Information If you have questions, please visit the Frequently Asked Questions section of the Yo[employer] website at [email or web address] Remember, [employer] is NOT to be used for urgent needs. For medical emergencies, dial 911. Now available from your iPhone and Android! Please provide this summary of care documentation to your next provider. Your primary care clinician is listed as Anna Caraballo. If you have any questions after today's visit, please call [phone]. No

## 2025-04-14 DIAGNOSIS — E11.22 TYPE 2 DIABETES MELLITUS WITH DIABETIC CHRONIC KIDNEY DISEASE, UNSPECIFIED CKD STAGE, UNSPECIFIED WHETHER LONG TERM INSULIN USE (HCC): ICD-10-CM

## 2025-04-14 NOTE — TELEPHONE ENCOUNTER
Requested Prescriptions     Pending Prescriptions Disp Refills    empagliflozin (JARDIANCE) 25 MG tablet 90 tablet 3     Sig: Take 1 tablet by mouth daily Indications: Diabetes

## 2025-05-05 DIAGNOSIS — I10 ESSENTIAL (PRIMARY) HYPERTENSION: ICD-10-CM

## 2025-05-05 DIAGNOSIS — E78.5 HYPERLIPIDEMIA, UNSPECIFIED HYPERLIPIDEMIA TYPE: ICD-10-CM

## 2025-05-05 DIAGNOSIS — N18.31 STAGE 3A CHRONIC KIDNEY DISEASE (HCC): ICD-10-CM

## 2025-05-05 DIAGNOSIS — E11.22 TYPE 2 DIABETES MELLITUS WITH DIABETIC CHRONIC KIDNEY DISEASE (HCC): ICD-10-CM

## 2025-05-05 NOTE — TELEPHONE ENCOUNTER
Requested Prescriptions     Pending Prescriptions Disp Refills    Semaglutide 14 MG TABS 90 tablet 3     Sig: Take 14 mg by mouth daily Indications: Diabetes Administer 30 minutes before the first food, beverage, or other medications of the day.

## 2025-07-11 DIAGNOSIS — E11.22 TYPE 2 DIABETES MELLITUS WITH DIABETIC CHRONIC KIDNEY DISEASE, UNSPECIFIED CKD STAGE, UNSPECIFIED WHETHER LONG TERM INSULIN USE (HCC): ICD-10-CM

## 2025-07-11 DIAGNOSIS — I10 ESSENTIAL (PRIMARY) HYPERTENSION: ICD-10-CM

## 2025-07-11 DIAGNOSIS — E78.5 HYPERLIPIDEMIA, UNSPECIFIED: ICD-10-CM

## 2025-07-11 RX ORDER — ROSUVASTATIN CALCIUM 5 MG/1
5 TABLET, COATED ORAL DAILY
Qty: 90 TABLET | Refills: 1 | Status: SHIPPED | OUTPATIENT
Start: 2025-07-11

## 2025-07-11 RX ORDER — CARVEDILOL 6.25 MG/1
6.25 TABLET ORAL 2 TIMES DAILY
Qty: 180 TABLET | Refills: 1 | Status: SHIPPED | OUTPATIENT
Start: 2025-07-11

## 2025-07-11 NOTE — TELEPHONE ENCOUNTER
Requested Prescriptions     Pending Prescriptions Disp Refills    metFORMIN (GLUCOPHAGE) 1000 MG tablet 90 tablet 1     Sig: Take 1 tablet by mouth every evening Indications: Diabetes

## 2025-07-11 NOTE — TELEPHONE ENCOUNTER
Requested Prescriptions     Pending Prescriptions Disp Refills    rosuvastatin (CRESTOR) 5 MG tablet 90 tablet 3     Sig: Take 1 tablet by mouth daily Indications: cholesterol    carvedilol (COREG) 6.25 MG tablet 180 tablet 1     Sig: Take 1 tablet by mouth 2 times daily    metFORMIN (GLUCOPHAGE) 1000 MG tablet 90 tablet 1     Sig: Take 1 tablet by mouth every evening Indications: Diabetes

## 2025-08-04 RX ORDER — RAMIPRIL 10 MG/1
10 CAPSULE ORAL DAILY
Qty: 30 CAPSULE | Refills: 0 | Status: SHIPPED | OUTPATIENT
Start: 2025-08-04

## 2025-08-13 ENCOUNTER — OFFICE VISIT (OUTPATIENT)
Dept: FAMILY MEDICINE CLINIC | Facility: CLINIC | Age: 75
End: 2025-08-13

## 2025-08-13 VITALS
OXYGEN SATURATION: 98 % | BODY MASS INDEX: 28.93 KG/M2 | SYSTOLIC BLOOD PRESSURE: 102 MMHG | TEMPERATURE: 98.4 F | DIASTOLIC BLOOD PRESSURE: 66 MMHG | RESPIRATION RATE: 16 BRPM | HEIGHT: 62 IN | HEART RATE: 91 BPM | WEIGHT: 157.2 LBS

## 2025-08-13 DIAGNOSIS — J06.9 VIRAL URI: Primary | ICD-10-CM

## 2025-08-13 RX ORDER — BENZONATATE 100 MG/1
100 CAPSULE ORAL 3 TIMES DAILY PRN
Qty: 30 CAPSULE | Refills: 0 | Status: SHIPPED | OUTPATIENT
Start: 2025-08-13 | End: 2025-08-23

## 2025-08-13 ASSESSMENT — ENCOUNTER SYMPTOMS
CHEST TIGHTNESS: 0
SHORTNESS OF BREATH: 0
COUGH: 1
SINUS PAIN: 0
SORE THROAT: 1

## 2025-09-02 RX ORDER — GLUCOSAMINE HCL/CHONDROITIN SU 500-400 MG
CAPSULE ORAL
Qty: 100 STRIP | Refills: 3 | Status: SHIPPED | OUTPATIENT
Start: 2025-09-02

## 2025-09-04 DIAGNOSIS — I10 ESSENTIAL (PRIMARY) HYPERTENSION: ICD-10-CM

## 2025-09-04 DIAGNOSIS — E11.22 TYPE 2 DIABETES MELLITUS WITH DIABETIC CHRONIC KIDNEY DISEASE (HCC): ICD-10-CM

## 2025-09-04 DIAGNOSIS — E78.5 HYPERLIPIDEMIA, UNSPECIFIED HYPERLIPIDEMIA TYPE: ICD-10-CM

## 2025-09-04 DIAGNOSIS — N18.31 STAGE 3A CHRONIC KIDNEY DISEASE (HCC): ICD-10-CM
